# Patient Record
Sex: FEMALE | Race: WHITE | NOT HISPANIC OR LATINO | Employment: OTHER | URBAN - METROPOLITAN AREA
[De-identification: names, ages, dates, MRNs, and addresses within clinical notes are randomized per-mention and may not be internally consistent; named-entity substitution may affect disease eponyms.]

---

## 2019-12-01 ENCOUNTER — TRANSFERRED RECORDS (OUTPATIENT)
Dept: HEALTH INFORMATION MANAGEMENT | Facility: CLINIC | Age: 74
End: 2019-12-01

## 2019-12-03 ENCOUNTER — TRANSFERRED RECORDS (OUTPATIENT)
Dept: HEALTH INFORMATION MANAGEMENT | Facility: CLINIC | Age: 74
End: 2019-12-03

## 2020-01-28 ENCOUNTER — APPOINTMENT (OUTPATIENT)
Dept: CT IMAGING | Facility: CLINIC | Age: 75
End: 2020-01-28
Attending: EMERGENCY MEDICINE
Payer: COMMERCIAL

## 2020-01-28 ENCOUNTER — APPOINTMENT (OUTPATIENT)
Dept: GENERAL RADIOLOGY | Facility: CLINIC | Age: 75
End: 2020-01-28
Attending: EMERGENCY MEDICINE
Payer: COMMERCIAL

## 2020-01-28 ENCOUNTER — HOSPITAL ENCOUNTER (EMERGENCY)
Facility: CLINIC | Age: 75
Discharge: HOME | End: 2020-01-28
Attending: EMERGENCY MEDICINE | Admitting: EMERGENCY MEDICINE
Payer: COMMERCIAL

## 2020-01-28 ENCOUNTER — TRANSFERRED RECORDS (OUTPATIENT)
Dept: HEALTH INFORMATION MANAGEMENT | Facility: CLINIC | Age: 75
End: 2020-01-28

## 2020-01-28 VITALS
RESPIRATION RATE: 21 BRPM | HEART RATE: 73 BPM | TEMPERATURE: 98 F | OXYGEN SATURATION: 97 % | DIASTOLIC BLOOD PRESSURE: 89 MMHG | WEIGHT: 170.19 LBS | SYSTOLIC BLOOD PRESSURE: 168 MMHG

## 2020-01-28 DIAGNOSIS — I48.0 PAROXYSMAL ATRIAL FIBRILLATION (H): ICD-10-CM

## 2020-01-28 LAB
ANION GAP SERPL CALCULATED.3IONS-SCNC: 5 MMOL/L (ref 3–14)
BASOPHILS # BLD AUTO: 0 10E9/L (ref 0–0.2)
BASOPHILS NFR BLD AUTO: 0.4 %
BUN SERPL-MCNC: 21 MG/DL (ref 7–30)
CALCIUM SERPL-MCNC: 9.6 MG/DL (ref 8.5–10.1)
CHLORIDE SERPL-SCNC: 107 MMOL/L (ref 94–109)
CO2 SERPL-SCNC: 28 MMOL/L (ref 20–32)
CREAT SERPL-MCNC: 0.72 MG/DL (ref 0.52–1.04)
D DIMER PPP FEU-MCNC: 0.6 UG/ML FEU (ref 0–0.5)
DIFFERENTIAL METHOD BLD: NORMAL
EOSINOPHIL # BLD AUTO: 0.2 10E9/L (ref 0–0.7)
EOSINOPHIL NFR BLD AUTO: 2.3 %
ERYTHROCYTE [DISTWIDTH] IN BLOOD BY AUTOMATED COUNT: 12.3 % (ref 10–15)
GFR SERPL CREATININE-BSD FRML MDRD: 82 ML/MIN/{1.73_M2}
GLUCOSE SERPL-MCNC: 117 MG/DL (ref 70–99)
HCT VFR BLD AUTO: 44.3 % (ref 35–47)
HGB BLD-MCNC: 14.4 G/DL (ref 11.7–15.7)
IMM GRANULOCYTES # BLD: 0 10E9/L (ref 0–0.4)
IMM GRANULOCYTES NFR BLD: 0.3 %
LYMPHOCYTES # BLD AUTO: 2.6 10E9/L (ref 0.8–5.3)
LYMPHOCYTES NFR BLD AUTO: 27.6 %
MCH RBC QN AUTO: 30.3 PG (ref 26.5–33)
MCHC RBC AUTO-ENTMCNC: 32.5 G/DL (ref 31.5–36.5)
MCV RBC AUTO: 93 FL (ref 78–100)
MONOCYTES # BLD AUTO: 1 10E9/L (ref 0–1.3)
MONOCYTES NFR BLD AUTO: 10.9 %
NEUTROPHILS # BLD AUTO: 5.5 10E9/L (ref 1.6–8.3)
NEUTROPHILS NFR BLD AUTO: 58.5 %
NRBC # BLD AUTO: 0 10*3/UL
NRBC BLD AUTO-RTO: 0 /100
PLATELET # BLD AUTO: 314 10E9/L (ref 150–450)
POTASSIUM SERPL-SCNC: 3.8 MMOL/L (ref 3.4–5.3)
RBC # BLD AUTO: 4.76 10E12/L (ref 3.8–5.2)
SODIUM SERPL-SCNC: 140 MMOL/L (ref 133–144)
TROPONIN I SERPL-MCNC: <0.015 UG/L (ref 0–0.04)
WBC # BLD AUTO: 9.4 10E9/L (ref 4–11)

## 2020-01-28 PROCEDURE — 84484 ASSAY OF TROPONIN QUANT: CPT | Performed by: EMERGENCY MEDICINE

## 2020-01-28 PROCEDURE — 93005 ELECTROCARDIOGRAM TRACING: CPT | Mod: 76

## 2020-01-28 PROCEDURE — 85379 FIBRIN DEGRADATION QUANT: CPT | Performed by: EMERGENCY MEDICINE

## 2020-01-28 PROCEDURE — 99285 EMERGENCY DEPT VISIT HI MDM: CPT | Mod: 25

## 2020-01-28 PROCEDURE — 71046 X-RAY EXAM CHEST 2 VIEWS: CPT

## 2020-01-28 PROCEDURE — 93005 ELECTROCARDIOGRAM TRACING: CPT

## 2020-01-28 PROCEDURE — 25000128 H RX IP 250 OP 636: Performed by: EMERGENCY MEDICINE

## 2020-01-28 PROCEDURE — 80048 BASIC METABOLIC PNL TOTAL CA: CPT | Performed by: EMERGENCY MEDICINE

## 2020-01-28 PROCEDURE — 25000125 ZZHC RX 250: Performed by: EMERGENCY MEDICINE

## 2020-01-28 PROCEDURE — 85025 COMPLETE CBC W/AUTO DIFF WBC: CPT | Performed by: EMERGENCY MEDICINE

## 2020-01-28 PROCEDURE — 25800030 ZZH RX IP 258 OP 636: Performed by: EMERGENCY MEDICINE

## 2020-01-28 PROCEDURE — 71275 CT ANGIOGRAPHY CHEST: CPT

## 2020-01-28 RX ORDER — IOPAMIDOL 755 MG/ML
500 INJECTION, SOLUTION INTRAVASCULAR ONCE
Status: COMPLETED | OUTPATIENT
Start: 2020-01-28 | End: 2020-01-28

## 2020-01-28 RX ORDER — SODIUM CHLORIDE 9 MG/ML
1000 INJECTION, SOLUTION INTRAVENOUS CONTINUOUS
Status: DISCONTINUED | OUTPATIENT
Start: 2020-01-28 | End: 2020-01-28 | Stop reason: HOSPADM

## 2020-01-28 RX ADMIN — SODIUM CHLORIDE 1000 ML: 9 INJECTION, SOLUTION INTRAVENOUS at 18:08

## 2020-01-28 RX ADMIN — IOPAMIDOL 60 ML: 755 INJECTION, SOLUTION INTRAVENOUS at 20:17

## 2020-01-28 RX ADMIN — SODIUM CHLORIDE 80 ML: 9 INJECTION, SOLUTION INTRAVENOUS at 20:18

## 2020-01-28 ASSESSMENT — ENCOUNTER SYMPTOMS
DIAPHORESIS: 0
COUGH: 0
PALPITATIONS: 1
SHORTNESS OF BREATH: 0
NAUSEA: 0
FEVER: 0
DIZZINESS: 0
VOMITING: 0

## 2020-01-28 NOTE — ED AVS SNAPSHOT
[facility] Emergency Department  [address]  [facility] [address]  Phone:  [phone]  Fax:  [phone]                                    Ebony Pinzon   MRN: [id number]    Department:  [facility] Emergency Department   Date of Visit:  1/28/2020           After Visit Summary Signature Page    I have received my discharge instructions, and my questions have been answered. I have discussed any challenges I see with this plan with the nurse or doctor.    ..........................................................................................................................................  Patient/Patient Representative Signature      ..........................................................................................................................................  Patient Representative Print Name and Relationship to Patient    ..................................................               ................................................  Date                                   Time    ..........................................................................................................................................  Reviewed by Signature/Title    ...................................................              ..............................................  Date                                               Time          22EPIC Rev 08/18

## 2020-01-28 NOTE — ED PROVIDER NOTES
History     Chief Complaint:  Palpitations    HPI   Ebony Pinzon is a 74 year old female, with a history of hypertension, who presents with her  to the ED for evaluation of palpitations. The patient reports she has a history of occasional episodes of palpitations. She had an episode that lasted for approximately an hour 2 months ago. She was evaluated by her PCP the following day and had a cardiac monitor for 24 hours. The patient notes she had an episode of persistent palpitations which began while sitting in her recliner at 2:00PM today. The patient denies any recent travel, chest pain, shortness of breath, fever, diaphoresis, dizziness, cough, nausea, vomiting, or other symptoms/complaints.      Allergies:  Augmentin: GI intolerance  Codeine: nausea     Medications:    Enalapril   Omeprazole   Calcium  Magnesium   Zinc  Centrum    Past Medical History:    GERD  HTN    Past Surgical History:    Bilateral carpal tunnel release  Tonsillectomy     Family History:    History reviewed. No pertinent family history.     Social History:  Smoking status: Never smoker    Alcohol use: Yes  Presents to ED with     Review of Systems   Constitutional: Negative for diaphoresis and fever.   Respiratory: Negative for cough and shortness of breath.    Cardiovascular: Positive for palpitations. Negative for chest pain.   Gastrointestinal: Negative for nausea and vomiting.   Neurological: Negative for dizziness.   All other systems reviewed and are negative.    Physical Exam     Patient Vitals for the past 24 hrs:   BP Temp Temp src Pulse Heart Rate Resp SpO2 Weight   01/28/20 2130 (!) 168/89 -- -- 73 77 21 97 % --   01/28/20 2125 (!) 155/118 -- -- -- 73 29 96 % --   01/28/20 2009 -- -- -- -- -- -- -- 77.2 kg (170 lb 3.1 oz)   01/28/20 2000 (!) 142/70 -- -- 75 -- -- 94 % --   01/28/20 1945 133/67 -- -- -- -- -- 96 % --   01/28/20 1845 (!) 151/88 -- -- 90 76 23 98 % --   01/28/20 1830 (!) 159/83 -- -- 77 97 --  96 % --   01/28/20 1815 (!) 150/81 -- -- 75 77 11 95 % --   01/28/20 1800 (!) 145/87 -- -- 89 81 12 97 % --   01/28/20 1745 (!) 146/82 -- -- 96 98 13 95 % --   01/28/20 1735 (!) 164/87 -- -- 100 101 16 98 % --   01/28/20 1720 (!) 167/114 98  F (36.7  C) Temporal -- 124 18 100 % --     Physical Exam  General: Patient is alert and interactive when I enter the room  Head:  The scalp, face, and head appear normal  Eyes:  Conjunctivae are normal  ENT:    The nose is normal    Pinnae are normal    External acoustic canals are normal  Neck:  Trachea midline  CV:  Pulses are normal, RRR    Resp:  No respiratory distress, CTAB   Abdomen:      Soft, non-tender, non-distended  Musc:  Normal muscular tone    No major joint effusions    No asymmetric leg swelling  Skin:  No rash or lesions noted  Neuro:  Speech is normal and fluent. Face is symmetric.     Moving all extremities well.   Psych: Awake. Alert.  Normal affect.  Appropriate interactions.    Emergency Department Course     ECG #1 (17:10:00):  Rate 154 bpm. IL interval *. QRS duration 80. QT/QTc 282/451. P-R-T axes * 61 124. Atrial fibrillation with rapid ventricular response. ST & T wave abnormality, consider inferior ischemia. Abnormal ECG. Interpreted at 0714 by Justyna Hernández MD.    ECG #2 (17:34:03):  Rate 104 bpm. IL interval 158. QRS duration 86. QT/QTc 332/436. P-R-T axes 73 63 69. Sinus tachycardia with premature atrial complexes. Right atrial enlargement. Borderline ECG. Interpreted at 1740 by Justyna Hernández MD.    Imaging:  Radiographic findings were communicated with the patient and family who voiced understanding of the findings.    XR Chest 2 Views  IMPRESSION: Negative chest. As read by Radiology.     CT Chest Pulmonary Embolism w Contrast  IMPRESSION:  1. Negative for pulmonary emboli.   2. Focal atelectasis in lingula.   As read by Radiology.     Laboratory:  Laboratory findings were communicated with the patient and family who voiced understanding  of the findings.    Troponin I (1731): <0.015  D dimer: 0.6(H)    CBC: o/w WNL (WBC 9.4, HGB 14.4, )  BMP: Glucose 117(H), o/w WNL (Creatinine 0.72)     Interventions:  1808: NS 1L Bolus IV    Emergency Department Course:  The patient was placed on continuous cardiac monitoring.    1710: EKG obtained in the ED, see results above.     1731: IV was inserted and blood was drawn for laboratory testing, results above.    1734: Repeat EKG obtained.    Past medical records, nursing notes, and vitals reviewed.    1748: I performed an exam of the patient as documented above.     The patient was sent for a chest x-ray while in the emergency department, results above.     2028: I rechecked the patient. Explained findings to patient and .    The patient was sent for a chest CT while in the emergency department, findings above.    2118: I rechecked the patient and discussed the results of her workup thus far.     Findings and plan explained to the patient and spouse. Patient discharged home with instructions regarding supportive care, medications, and reasons to return. The importance of close follow-up was reviewed. The patient was prescribed Xarelto.     I personally reviewed the laboratory results with the patient and spouse and answered all related questions prior to discharge.     Impression & Plan      Medical Decision Making:  Ebony Pinzon is a 75 yo F who presents with palpatitions. Her initial EKG showed afib w/ RVR. This is a new diagnosis for her. Fortunately, patient cardioverted to NSR on her own. EKG confirmed this. She has had similar symptoms before. Her JOYCE Vasc2 is 3. Recommend anticoagulation. D-dimer was slightly elevated so CTPE was done. This was normal. Troponin normal. Patient will be discharged with cardiology follow-up and xarelto. Counseled on anticoagulation. Patient discharged.     Diagnosis:    ICD-10-CM   1. Paroxysmal atrial fibrillation (H) I48.0     Disposition: Patient  discharged to home with       Discharge Medications:   Details   rivaroxaban ANTICOAGULANT (XARELTO ANTICOAGULANT) 20 MG TABS tablet Take 1 tablet (20 mg) by mouth daily (with dinner), Disp-30 tablet, R-0, Local Print     Tiffany Tanner  1/28/2020   [facility] EMERGENCY DEPARTMENT    Scribe Disclosure:  I, Tiffany Tanner, am serving as a scribe at 5:48 PM on 1/28/2020 to document services personally performed by Justyna Hernández MD based on my observations and the provider's statements to me.        Justyna Hernández MD  01/31/20 0994

## 2020-01-28 NOTE — ED TRIAGE NOTES
Pt arrives with  for palpitations that started at 2 pm while pt was sitting in recliner. Denies hx of abnormal heart rhythm. Denies SOB, chest pain, or dizziness.

## 2020-01-29 LAB
INTERPRETATION ECG - MUSE: NORMAL
INTERPRETATION ECG - MUSE: NORMAL

## 2020-01-29 NOTE — ED NOTES
Pt teaching done regarding new Xarelto prescription. Print out provided from [place] [employer] [facility] and reviewed w/ pt. I explained to pt that she will need to stop using Aleve daily due to the increased risk for bleeding, other medications to avoid, when to take it, and when to return to the hospital. I additionally explained why blood thinners are used with afib and she verbalized understanding. Card for [facility] was also provided to the pt.

## 2020-01-31 ENCOUNTER — HOSPITAL ENCOUNTER (OUTPATIENT)
Dept: CARDIOLOGY | Facility: CLINIC | Age: 75
Discharge: HOME | End: 2020-01-31
Attending: INTERNAL MEDICINE | Admitting: INTERNAL MEDICINE
Payer: COMMERCIAL

## 2020-01-31 ENCOUNTER — OFFICE VISIT (OUTPATIENT)
Dept: CARDIOLOGY | Facility: CLINIC | Age: 75
End: 2020-01-31
Payer: COMMERCIAL

## 2020-01-31 VITALS
HEIGHT: 67 IN | WEIGHT: 169.8 LBS | BODY MASS INDEX: 26.65 KG/M2 | SYSTOLIC BLOOD PRESSURE: 134 MMHG | HEART RATE: 72 BPM | DIASTOLIC BLOOD PRESSURE: 80 MMHG

## 2020-01-31 DIAGNOSIS — I48.0 PAROXYSMAL ATRIAL FIBRILLATION (H): ICD-10-CM

## 2020-01-31 DIAGNOSIS — I48.0 PAROXYSMAL ATRIAL FIBRILLATION (H): Primary | ICD-10-CM

## 2020-01-31 PROCEDURE — 93306 TTE W/DOPPLER COMPLETE: CPT

## 2020-01-31 PROCEDURE — 99204 OFFICE O/P NEW MOD 45 MIN: CPT | Mod: 25 | Performed by: INTERNAL MEDICINE

## 2020-01-31 PROCEDURE — 93306 TTE W/DOPPLER COMPLETE: CPT | Mod: 26 | Performed by: INTERNAL MEDICINE

## 2020-01-31 RX ORDER — ACETAMINOPHEN 325 MG/1
325-650 TABLET ORAL EVERY 6 HOURS PRN
COMMUNITY
End: 2022-08-22

## 2020-01-31 RX ORDER — ENALAPRIL MALEATE 20 MG/1
20 TABLET ORAL DAILY
COMMUNITY
End: 2021-01-29

## 2020-01-31 RX ORDER — MULTIVIT WITH MINERALS/LUTEIN
1 TABLET ORAL DAILY
COMMUNITY

## 2020-01-31 RX ORDER — METOPROLOL TARTRATE 25 MG/1
25 TABLET, FILM COATED ORAL 2 TIMES DAILY
Qty: 30 TABLET | Refills: 1 | Status: SHIPPED | OUTPATIENT
Start: 2020-01-31 | End: 2020-06-15

## 2020-01-31 ASSESSMENT — MIFFLIN-ST. JEOR: SCORE: 1307.84

## 2020-01-31 NOTE — LETTER
1/31/2020      [facility]  [address] [facility] [address]      RE: Ebonyleticia Pinzon       Dear Colleague,    I had the pleasure of seeing Ebony Pinzon in the [facility].    Service Date: 01/31/2020      NEW PATIENT VISIT      REFERRING PHYSICIAN:  [facility].      HISTORY OF PRESENT ILLNESS:  Ms. Pinzon is a very pleasant 74-year-old female who comes into clinic today after presenting to the ER 2 days ago with symptoms of palpitations.  She was noted to be in paroxysmal atrial fibrillation.  She did spontaneously convert.  It looks like she was quite hypertensive when she first presented.  Blood pressure 168/89.  I am not sure if they gave her anything.  It is not recorded here, but she does say she went back into a normal rhythm before she left.  She had some tests including a chest CT that was fairly normal, troponin normal, and lab tests, basic metabolic panel and CBC which were normal.  She was given Xarelto for CVA prophylaxis due to a high CHADS score and recommended to follow up with Cardiology.  She states this has happened a couple of times in the past, last was in November around Thanksgiving time.  Her and her  have recently moved to a different home.  This has been very stressful for her.  She has been drinking a lot of caffeine.  She does not drink excessive alcohol, but does have an occasional glass of wine or two.  She has had high blood pressure and has been treated with enalapril and this has worked well to control her blood pressure.  She denies history of diabetes or previous stroke.  No previous history of any type of heart disease.  Her father had what sounds like congestive heart failure and an aortic valve problem.  No history of atrial arrhythmias that she was aware of.  She does have a longstanding murmur since childhood that she knows about.  I do not see that she has had an  echocardiogram in recent decades, at least in her chart.  I did review her EKG from 2 days ago in the ER.  This does demonstrate an atrial fibrillation with a rapid ventricular rate with nonspecific ST and T changes.  Heart rate was 154.  A second electrocardiogram shows a sinus tachycardia with conversion back to normal rhythm on the last beat.      PHYSICAL EXAMINATION:   VITAL SIGNS:  On exam today, her blood pressure is 134/80, pulse is 72, weight is 169 with a body mass index of 26.   NECK:  Carotid upstrokes are brisk without bruit.   CARDIOVASCULAR:  Tones today are regular, suggesting a normal sinus rhythm.  She does have a grade 2 systolic ejection murmur best heard at the right upper sternal border.   LUNGS:  Clear.   EXTREMITIES:  She has no peripheral edema.  She does have a history of varicose veins with vein ablation procedure last year in [facility].      SUMMARY:  Ms. Pinzon is a very pleasant 74-year-old female with a history of hypertension and new onset paroxysmal atrial fibrillation.  Most of this appointment was spent in education and counseling about her atrial arrhythmia, reasoning for anticoagulation.  She does have a CHADS-VASc score of 3, making it appropriate to continue with the Xarelto.  She is tolerating this medication so far.  We talked about rate versus rhythm control strategy.  It sounds like she has only had a couple episodes of atrial fibrillation.  We talked about switching her blood pressure medication from lisinopril to metoprolol.  It may help to suppress atrial fibrillation even though it is not a true antiarrhythmic.  Her and her  are, however, going [place] in about a week for a few days and so I do not want to change any of her medications right before she leaves.  We will plan to see her back after she returns.  In the meantime, I would like her to undergo an echocardiogram just to look for any structural abnormalities to make sure that this continues to be a  benign flow murmur that she has on auscultation.  She is agreeable and we will get that scheduled right away before she leaves and the results to her and then follow up with her when she returns.        Please feel free to contact me with any questions you have in regards to her care.  Thank you for allowing me to participate in the care of your nice patient.         GENNY ORO DO             D: 2020   T: 2020   MT: REG      Name:     ARIANA ROBB   MRN:      [id number]        Account:      [id number]   :      1945           Service Date: 2020      Document: [id number]         Outpatient Encounter Medications as of 2020   Medication Sig Dispense Refill     acetaminophen (TYLENOL) 325 MG tablet Take 325-650 mg by mouth every 6 hours as needed for mild pain       Calcium Carb-Cholecalciferol (CALCIUM 500+D3 PO) Take by mouth daily       enalapril (VASOTEC) 20 MG tablet Take 20 mg by mouth daily       metoprolol tartrate (LOPRESSOR) 25 MG tablet Take 1 tablet (25 mg) by mouth 2 times daily 30 tablet 1     multivitamin (CENTRUM SILVER) tablet Take 1 tablet by mouth daily       omeprazole (PRILOSEC) 20 MG DR capsule Take 20 mg by mouth daily       rivaroxaban ANTICOAGULANT (XARELTO ANTICOAGULANT) 20 MG TABS tablet Take 1 tablet (20 mg) by mouth daily (with dinner) 30 tablet 0     No facility-administered encounter medications on file as of 2020.        Again, thank you for allowing me to participate in the care of your patient.      Sincerely,    Genny Oro DO     [facility]

## 2020-01-31 NOTE — LETTER
1/31/2020    [facility]  [address] [facility] [address]    RE: Ebony Pinzon       Dear Colleague,    I had the pleasure of seeing Ebony Pinzon in the [facility].    HPI and Plan:   See dictation    Orders Placed This Encounter   Procedures     Follow-Up with Cardiologist     Echocardiogram Complete       Orders Placed This Encounter   Medications     enalapril (VASOTEC) 20 MG tablet     Sig: Take 20 mg by mouth daily     omeprazole (PRILOSEC) 20 MG DR capsule     Sig: Take 20 mg by mouth daily     multivitamin (CENTRUM SILVER) tablet     Sig: Take 1 tablet by mouth daily     Calcium Carb-Cholecalciferol (CALCIUM 500+D3 PO)     Sig: Take by mouth daily     acetaminophen (TYLENOL) 325 MG tablet     Sig: Take 325-650 mg by mouth every 6 hours as needed for mild pain     metoprolol tartrate (LOPRESSOR) 25 MG tablet     Sig: Take 1 tablet (25 mg) by mouth 2 times daily     Dispense:  30 tablet     Refill:  1       There are no discontinued medications.      Encounter Diagnosis   Name Primary?     Paroxysmal atrial fibrillation (H) Yes       CURRENT MEDICATIONS:  Current Outpatient Medications   Medication Sig Dispense Refill     acetaminophen (TYLENOL) 325 MG tablet Take 325-650 mg by mouth every 6 hours as needed for mild pain       Calcium Carb-Cholecalciferol (CALCIUM 500+D3 PO) Take by mouth daily       enalapril (VASOTEC) 20 MG tablet Take 20 mg by mouth daily       metoprolol tartrate (LOPRESSOR) 25 MG tablet Take 1 tablet (25 mg) by mouth 2 times daily 30 tablet 1     multivitamin (CENTRUM SILVER) tablet Take 1 tablet by mouth daily       omeprazole (PRILOSEC) 20 MG DR capsule Take 20 mg by mouth daily       rivaroxaban ANTICOAGULANT (XARELTO ANTICOAGULANT) 20 MG TABS tablet Take 1 tablet (20 mg) by mouth daily (with dinner) 30 tablet 0       ALLERGIES   No Known Allergies    PAST MEDICAL HISTORY:  No past medical history on  file.    PAST SURGICAL HISTORY:  No past surgical history on file.    FAMILY HISTORY:  Family History   Problem Relation Age of Onset     Breast Cancer Mother      Atrial fibrillation Father      Other - See Comments Father         enlarged heart; leaky heart valve     Cataracts Sister      No Known Problems Brother      No Known Problems Sister      No Known Problems Brother      Arthritis Son      Arthritis Son        SOCIAL HISTORY:  Social History     Socioeconomic History     Marital status:      Spouse name: None     Number of children: None     Years of education: None     Highest education level: None   Occupational History     None   Social Needs     Financial resource strain: None     Food insecurity:     Worry: None     Inability: None     Transportation needs:     Medical: None     Non-medical: None   Tobacco Use     Smoking status: Never Smoker     Smokeless tobacco: Never Used   Substance and Sexual Activity     Alcohol use: Not Currently     Drug use: None     Sexual activity: None   Lifestyle     Physical activity:     Days per week: None     Minutes per session: None     Stress: None   Relationships     Social connections:     Talks on phone: None     Gets together: None     Attends [employer] service: None     Active member of club or organization: None     Attends meetings of clubs or organizations: None     Relationship status: None     Intimate partner violence:     Fear of current or ex partner: None     Emotionally abused: None     Physically abused: None     Forced sexual activity: None   Other Topics Concern     None   Social History Narrative     None       Review of Systems:  Skin:  Negative       Eyes:  Positive for glasses    ENT:  Negative      Respiratory:  Negative       Cardiovascular:    Positive for;palpitations;chest pain    Gastroenterology: Negative      Genitourinary:  Positive for urgency    Musculoskeletal:  Positive for arthritis;nocturnal cramping thumbs, shoulders,  "knee, hip  Neurologic:  Negative      Psychiatric:  Negative      Heme/Lymph/Imm:  Negative      Endocrine:  Negative        Physical Exam:  Vitals: /80 (BP Location: Right arm, Patient Position: Sitting, Cuff Size: Adult Regular)   Pulse 72   Ht 1.702 m (5' 7\")   Wt 77 kg (169 lb 12.8 oz)   BMI 26.59 kg/m       Constitutional:  cooperative;well nourished;in no acute distress        Skin:  warm and dry to the touch          Head:  normocephalic        Eyes:  pupils equal and round        Lymph:      ENT:  no pallor or cyanosis        Neck:  carotid pulses are full and equal bilaterally        Respiratory:  clear to auscultation;normal symmetry         Cardiac: regular rhythm;no murmurs, gallops or rubs detected                pulses full and equal                                        GI:  abdomen soft;no bruits        Extremities and Muscular Skeletal:  no deformities, clubbing, cyanosis, erythema observed;no edema              Neurological:  no gross motor deficits;affect appropriate        Psych:  Alert and Oriented x 3          [place] [address]                    Thank you for allowing me to participate in the care of your patient.      Sincerely,     Genny Oro,      [facility]    cc:   [place] [address]        "

## 2020-01-31 NOTE — PROGRESS NOTES
HPI and Plan:   See dictation    Orders Placed This Encounter   Procedures     Follow-Up with Cardiologist     Echocardiogram Complete       Orders Placed This Encounter   Medications     enalapril (VASOTEC) 20 MG tablet     Sig: Take 20 mg by mouth daily     omeprazole (PRILOSEC) 20 MG DR capsule     Sig: Take 20 mg by mouth daily     multivitamin (CENTRUM SILVER) tablet     Sig: Take 1 tablet by mouth daily     Calcium Carb-Cholecalciferol (CALCIUM 500+D3 PO)     Sig: Take by mouth daily     acetaminophen (TYLENOL) 325 MG tablet     Sig: Take 325-650 mg by mouth every 6 hours as needed for mild pain     metoprolol tartrate (LOPRESSOR) 25 MG tablet     Sig: Take 1 tablet (25 mg) by mouth 2 times daily     Dispense:  30 tablet     Refill:  1       There are no discontinued medications.      Encounter Diagnosis   Name Primary?     Paroxysmal atrial fibrillation (H) Yes       CURRENT MEDICATIONS:  Current Outpatient Medications   Medication Sig Dispense Refill     acetaminophen (TYLENOL) 325 MG tablet Take 325-650 mg by mouth every 6 hours as needed for mild pain       Calcium Carb-Cholecalciferol (CALCIUM 500+D3 PO) Take by mouth daily       enalapril (VASOTEC) 20 MG tablet Take 20 mg by mouth daily       metoprolol tartrate (LOPRESSOR) 25 MG tablet Take 1 tablet (25 mg) by mouth 2 times daily 30 tablet 1     multivitamin (CENTRUM SILVER) tablet Take 1 tablet by mouth daily       omeprazole (PRILOSEC) 20 MG DR capsule Take 20 mg by mouth daily       rivaroxaban ANTICOAGULANT (XARELTO ANTICOAGULANT) 20 MG TABS tablet Take 1 tablet (20 mg) by mouth daily (with dinner) 30 tablet 0       ALLERGIES   No Known Allergies    PAST MEDICAL HISTORY:  No past medical history on file.    PAST SURGICAL HISTORY:  No past surgical history on file.    FAMILY HISTORY:  Family History   Problem Relation Age of Onset     Breast Cancer Mother      Atrial fibrillation Father      Other - See Comments Father         enlarged heart; leaky  heart valve     Cataracts Sister      No Known Problems Brother      No Known Problems Sister      No Known Problems Brother      Arthritis Son      Arthritis Son        SOCIAL HISTORY:  Social History     Socioeconomic History     Marital status:      Spouse name: None     Number of children: None     Years of education: None     Highest education level: None   Occupational History     None   Social Needs     Financial resource strain: None     Food insecurity:     Worry: None     Inability: None     Transportation needs:     Medical: None     Non-medical: None   Tobacco Use     Smoking status: Never Smoker     Smokeless tobacco: Never Used   Substance and Sexual Activity     Alcohol use: Not Currently     Drug use: None     Sexual activity: None   Lifestyle     Physical activity:     Days per week: None     Minutes per session: None     Stress: None   Relationships     Social connections:     Talks on phone: None     Gets together: None     Attends [employer] service: None     Active member of club or organization: None     Attends meetings of clubs or organizations: None     Relationship status: None     Intimate partner violence:     Fear of current or ex partner: None     Emotionally abused: None     Physically abused: None     Forced sexual activity: None   Other Topics Concern     None   Social History Narrative     None       Review of Systems:  Skin:  Negative       Eyes:  Positive for glasses    ENT:  Negative      Respiratory:  Negative       Cardiovascular:    Positive for;palpitations;chest pain    Gastroenterology: Negative      Genitourinary:  Positive for urgency    Musculoskeletal:  Positive for arthritis;nocturnal cramping thumbs, shoulders, knee, hip  Neurologic:  Negative      Psychiatric:  Negative      Heme/Lymph/Imm:  Negative      Endocrine:  Negative        Physical Exam:  Vitals: /80 (BP Location: Right arm, Patient Position: Sitting, Cuff Size: Adult Regular)   Pulse 72   Ht  "1.702 m (5' 7\")   Wt 77 kg (169 lb 12.8 oz)   BMI 26.59 kg/m      Constitutional:  cooperative;well nourished;in no acute distress        Skin:  warm and dry to the touch          Head:  normocephalic        Eyes:  pupils equal and round        Lymph:      ENT:  no pallor or cyanosis        Neck:  carotid pulses are full and equal bilaterally        Respiratory:  clear to auscultation;normal symmetry         Cardiac: regular rhythm;no murmurs, gallops or rubs detected                pulses full and equal                                        GI:  abdomen soft;no bruits        Extremities and Muscular Skeletal:  no deformities, clubbing, cyanosis, erythema observed;no edema              Neurological:  no gross motor deficits;affect appropriate        Psych:  Alert and Oriented x 3          CC  [facility]  [address] [place] [address]                  "

## 2020-01-31 NOTE — PROGRESS NOTES
Service Date: 01/31/2020      NEW PATIENT VISIT      REFERRING PHYSICIAN:  [facility].      HISTORY OF PRESENT ILLNESS:  Ms. Pinzon is a very pleasant 74-year-old female who comes into clinic today after presenting to the ER 2 days ago with symptoms of palpitations.  She was noted to be in paroxysmal atrial fibrillation.  She did spontaneously convert.  It looks like she was quite hypertensive when she first presented.  Blood pressure 168/89.  I am not sure if they gave her anything.  It is not recorded here, but she does say she went back into a normal rhythm before she left.  She had some tests including a chest CT that was fairly normal, troponin normal, and lab tests, basic metabolic panel and CBC which were normal.  She was given Xarelto for CVA prophylaxis due to a high CHADS score and recommended to follow up with Cardiology.  She states this has happened a couple of times in the past, last was in November around Thanksgiving time.  Her and her  have recently moved to a different home.  This has been very stressful for her.  She has been drinking a lot of caffeine.  She does not drink excessive alcohol, but does have an occasional glass of wine or two.  She has had high blood pressure and has been treated with enalapril and this has worked well to control her blood pressure.  She denies history of diabetes or previous stroke.  No previous history of any type of heart disease.  Her father had what sounds like congestive heart failure and an aortic valve problem.  No history of atrial arrhythmias that she was aware of.  She does have a longstanding murmur since childhood that she knows about.  I do not see that she has had an echocardiogram in recent decades, at least in her chart.  I did review her EKG from 2 days ago in the ER.  This does demonstrate an atrial fibrillation with a rapid ventricular rate with nonspecific ST and T changes.  Heart rate was 154.  A second  electrocardiogram shows a sinus tachycardia with conversion back to normal rhythm on the last beat.      PHYSICAL EXAMINATION:   VITAL SIGNS:  On exam today, her blood pressure is 134/80, pulse is 72, weight is 169 with a body mass index of 26.   NECK:  Carotid upstrokes are brisk without bruit.   CARDIOVASCULAR:  Tones today are regular, suggesting a normal sinus rhythm.  She does have a grade 2 systolic ejection murmur best heard at the right upper sternal border.   LUNGS:  Clear.   EXTREMITIES:  She has no peripheral edema.  She does have a history of varicose veins with vein ablation procedure last year in [facility].      SUMMARY:  Ms. Pinzon is a very pleasant 74-year-old female with a history of hypertension and new onset paroxysmal atrial fibrillation.  Most of this appointment was spent in education and counseling about her atrial arrhythmia, reasoning for anticoagulation.  She does have a CHADS-VASc score of 3, making it appropriate to continue with the Xarelto.  She is tolerating this medication so far.  We talked about rate versus rhythm control strategy.  It sounds like she has only had a couple episodes of atrial fibrillation.  We talked about switching her blood pressure medication from lisinopril to metoprolol.  It may help to suppress atrial fibrillation even though it is not a true antiarrhythmic.  Her and her  are, however, going overseas in about a week for a few days and so I do not want to change any of her medications right before she leaves.  We will plan to see her back after she returns.  In the meantime, I would like her to undergo an echocardiogram just to look for any structural abnormalities to make sure that this continues to be a benign flow murmur that she has on auscultation.  She is agreeable and we will get that scheduled right away before she leaves and the results to her and then follow up with her when she returns.        Please feel free to contact me with any  questions you have in regards to her care.  Thank you for allowing me to participate in the care of your nice patient.         VAHE ESQUIVEL DO             D: 2020   T: 2020   MT: REG      Name:     ARIANA ROBB   MRN:      [id number]        Account:      [id number]   :      1945           Service Date: 2020      Document: [id number]

## 2020-02-05 ENCOUNTER — MYC MEDICAL ADVICE (OUTPATIENT)
Dept: CARDIOLOGY | Facility: CLINIC | Age: 75
End: 2020-02-05

## 2020-02-06 NOTE — TELEPHONE ENCOUNTER
Received dentaZOOM message below from pt:     Ebony Pinzon [facility] Heart Team 1   Phone Number: [phone]             Pending sale to [facility] Dr. Oro.    Would it be safe for me to take one naproxen a day in the morning? I am on 20 mg of Xeralto, which I take with my evening meal.   I have noticed that the one naproxen seem to shaw off a fair bit of stiffness and tylonel just doesn't seem to help that much.  Will definitely wait to hear back before I start.  Thank you.     Ebony Pinzon  [phone] cell.      Will route to Dr. Oro to review.     Addendum 2/7/20: Reviewed with Dr. Oro, who advised against pt taking naproxen due to risk of increased bleeding. Response sent to pt via dentaZOOM.

## 2020-02-21 ENCOUNTER — OFFICE VISIT (OUTPATIENT)
Dept: CARDIOLOGY | Facility: CLINIC | Age: 75
End: 2020-02-21
Attending: INTERNAL MEDICINE
Payer: COMMERCIAL

## 2020-02-21 ENCOUNTER — TELEPHONE (OUTPATIENT)
Dept: CARDIOLOGY | Facility: CLINIC | Age: 75
End: 2020-02-21

## 2020-02-21 VITALS
WEIGHT: 167.7 LBS | BODY MASS INDEX: 26.32 KG/M2 | HEIGHT: 67 IN | HEART RATE: 76 BPM | SYSTOLIC BLOOD PRESSURE: 122 MMHG | DIASTOLIC BLOOD PRESSURE: 60 MMHG

## 2020-02-21 DIAGNOSIS — I48.0 PAROXYSMAL ATRIAL FIBRILLATION (H): ICD-10-CM

## 2020-02-21 PROCEDURE — 99214 OFFICE O/P EST MOD 30 MIN: CPT | Performed by: INTERNAL MEDICINE

## 2020-02-21 RX ORDER — NITROFURANTOIN 25; 75 MG/1; MG/1
CAPSULE ORAL
COMMUNITY
Start: 2020-02-20 | End: 2021-08-13

## 2020-02-21 ASSESSMENT — MIFFLIN-ST. JEOR: SCORE: 1298.31

## 2020-02-21 NOTE — LETTER
2/21/2020    [facility]  [address] [facility] [place] [address]    RE: Ebony Pinzon       Dear Colleague,    I had the pleasure of seeing Ebony Pinzon in the [facility].    HPI and Plan:   See dictation    No orders of the defined types were placed in this encounter.      Orders Placed This Encounter   Medications     nitroFURantoin macrocrystal-monohydrate 100 MG PO capsule     rivaroxaban ANTICOAGULANT (XARELTO ANTICOAGULANT) 20 MG PO TABS tablet     Sig: Take 1 tablet (20 mg) by mouth daily (with dinner)     Dispense:  90 tablet     Refill:  4       Medications Discontinued During This Encounter   Medication Reason     rivaroxaban ANTICOAGULANT (XARELTO ANTICOAGULANT) 20 MG TABS tablet Reorder         Encounter Diagnosis   Name Primary?     Paroxysmal atrial fibrillation (H)        CURRENT MEDICATIONS:  Current Outpatient Medications   Medication Sig Dispense Refill     acetaminophen (TYLENOL) 325 MG tablet Take 325-650 mg by mouth every 6 hours as needed for mild pain       Calcium Carb-Cholecalciferol (CALCIUM 500+D3 PO) Take by mouth daily       enalapril (VASOTEC) 20 MG tablet Take 20 mg by mouth daily       metoprolol tartrate (LOPRESSOR) 25 MG tablet Take 1 tablet (25 mg) by mouth 2 times daily (Patient taking differently: Take 25 mg by mouth as needed ) 30 tablet 1     multivitamin (CENTRUM SILVER) tablet Take 1 tablet by mouth daily       nitroFURantoin macrocrystal-monohydrate 100 MG PO capsule        omeprazole (PRILOSEC) 20 MG DR capsule Take 20 mg by mouth daily       rivaroxaban ANTICOAGULANT (XARELTO ANTICOAGULANT) 20 MG PO TABS tablet Take 1 tablet (20 mg) by mouth daily (with dinner) 90 tablet 4       ALLERGIES     Allergies   Allergen Reactions     Augmentin      Other reaction(s): GI intolerance  C-Diff     Codeine Nausea       PAST MEDICAL HISTORY:  Past Medical History:   Diagnosis Date     Paroxysmal A-fib (H)  2/21/2020       PAST SURGICAL HISTORY:  History reviewed. No pertinent surgical history.    FAMILY HISTORY:  Family History   Problem Relation Age of Onset     Breast Cancer Mother      Atrial fibrillation Father      Other - See Comments Father         enlarged heart; leaky heart valve     Cataracts Sister      No Known Problems Brother      No Known Problems Sister      No Known Problems Brother      Arthritis Son      Arthritis Son        SOCIAL HISTORY:  Social History     Socioeconomic History     Marital status:      Spouse name: None     Number of children: None     Years of education: None     Highest education level: None   Occupational History     None   Social Needs     Financial resource strain: None     Food insecurity:     Worry: None     Inability: None     Transportation needs:     Medical: None     Non-medical: None   Tobacco Use     Smoking status: Never Smoker     Smokeless tobacco: Never Used   Substance and Sexual Activity     Alcohol use: Not Currently     Drug use: None     Sexual activity: None   Lifestyle     Physical activity:     Days per week: None     Minutes per session: None     Stress: None   Relationships     Social connections:     Talks on phone: None     Gets together: None     Attends Hinduism service: None     Active member of club or organization: None     Attends meetings of clubs or organizations: None     Relationship status: None     Intimate partner violence:     Fear of current or ex partner: None     Emotionally abused: None     Physically abused: None     Forced sexual activity: None   Other Topics Concern     None   Social History Narrative     None       Review of Systems:  Skin:  Negative       Eyes:  Positive for glasses    ENT:  Negative      Respiratory:  Negative       Cardiovascular:  Negative      Gastroenterology: Negative      Genitourinary:  Positive for urinary tract infection;urinary frequency    Musculoskeletal:  Positive for arthritis   "  Neurologic:  Negative      Psychiatric:  Negative      Heme/Lymph/Imm:  Negative      Endocrine:  Negative        Physical Exam:  Vitals: /60 (BP Location: Right arm, Patient Position: Sitting, Cuff Size: Adult Regular)   Pulse 76   Ht 1.702 m (5' 7\")   Wt 76.1 kg (167 lb 11.2 oz)   LMP  (LMP Unknown)   Breastfeeding No   BMI 26.27 kg/m       Constitutional:  cooperative;well nourished;in no acute distress        Skin:  warm and dry to the touch          Head:  normocephalic        Eyes:  pupils equal and round        Lymph:      ENT:  no pallor or cyanosis        Neck:  carotid pulses are full and equal bilaterally        Respiratory:  clear to auscultation;normal symmetry         Cardiac: regular rhythm;no murmurs, gallops or rubs detected                pulses full and equal                                        GI:  abdomen soft;no bruits        Extremities and Muscular Skeletal:  no deformities, clubbing, cyanosis, erythema observed;no edema              Neurological:  no gross motor deficits;affect appropriate        Psych:  Alert and Oriented x 3          CC  Genyn Oro DO  [address]  [place] [address]                    Thank you for allowing me to participate in the care of your patient.      Sincerely,     Genny Oro DO     [facility]    cc:   Genny Oro DO  [address]  [place] [address]        "

## 2020-02-21 NOTE — PROGRESS NOTES
HPI and Plan:   See dictation    No orders of the defined types were placed in this encounter.      Orders Placed This Encounter   Medications     nitroFURantoin macrocrystal-monohydrate 100 MG PO capsule     rivaroxaban ANTICOAGULANT (XARELTO ANTICOAGULANT) 20 MG PO TABS tablet     Sig: Take 1 tablet (20 mg) by mouth daily (with dinner)     Dispense:  90 tablet     Refill:  4       Medications Discontinued During This Encounter   Medication Reason     rivaroxaban ANTICOAGULANT (XARELTO ANTICOAGULANT) 20 MG TABS tablet Reorder         Encounter Diagnosis   Name Primary?     Paroxysmal atrial fibrillation (H)        CURRENT MEDICATIONS:  Current Outpatient Medications   Medication Sig Dispense Refill     acetaminophen (TYLENOL) 325 MG tablet Take 325-650 mg by mouth every 6 hours as needed for mild pain       Calcium Carb-Cholecalciferol (CALCIUM 500+D3 PO) Take by mouth daily       enalapril (VASOTEC) 20 MG tablet Take 20 mg by mouth daily       metoprolol tartrate (LOPRESSOR) 25 MG tablet Take 1 tablet (25 mg) by mouth 2 times daily (Patient taking differently: Take 25 mg by mouth as needed ) 30 tablet 1     multivitamin (CENTRUM SILVER) tablet Take 1 tablet by mouth daily       nitroFURantoin macrocrystal-monohydrate 100 MG PO capsule        omeprazole (PRILOSEC) 20 MG DR capsule Take 20 mg by mouth daily       rivaroxaban ANTICOAGULANT (XARELTO ANTICOAGULANT) 20 MG PO TABS tablet Take 1 tablet (20 mg) by mouth daily (with dinner) 90 tablet 4       ALLERGIES     Allergies   Allergen Reactions     Augmentin      Other reaction(s): GI intolerance  C-Diff     Codeine Nausea       PAST MEDICAL HISTORY:  Past Medical History:   Diagnosis Date     Paroxysmal A-fib (H) 2/21/2020       PAST SURGICAL HISTORY:  History reviewed. No pertinent surgical history.    FAMILY HISTORY:  Family History   Problem Relation Age of Onset     Breast Cancer Mother      Atrial fibrillation Father      Other - See Comments Father          "enlarged heart; leaky heart valve     Cataracts Sister      No Known Problems Brother      No Known Problems Sister      No Known Problems Brother      Arthritis Son      Arthritis Son        SOCIAL HISTORY:  Social History     Socioeconomic History     Marital status:      Spouse name: None     Number of children: None     Years of education: None     Highest education level: None   Occupational History     None   Social Needs     Financial resource strain: None     Food insecurity:     Worry: None     Inability: None     Transportation needs:     Medical: None     Non-medical: None   Tobacco Use     Smoking status: Never Smoker     Smokeless tobacco: Never Used   Substance and Sexual Activity     Alcohol use: Not Currently     Drug use: None     Sexual activity: None   Lifestyle     Physical activity:     Days per week: None     Minutes per session: None     Stress: None   Relationships     Social connections:     Talks on phone: None     Gets together: None     Attends Jain service: None     Active member of club or organization: None     Attends meetings of clubs or organizations: None     Relationship status: None     Intimate partner violence:     Fear of current or ex partner: None     Emotionally abused: None     Physically abused: None     Forced sexual activity: None   Other Topics Concern     None   Social History Narrative     None       Review of Systems:  Skin:  Negative       Eyes:  Positive for glasses    ENT:  Negative      Respiratory:  Negative       Cardiovascular:  Negative      Gastroenterology: Negative      Genitourinary:  Positive for urinary tract infection;urinary frequency    Musculoskeletal:  Positive for arthritis    Neurologic:  Negative      Psychiatric:  Negative      Heme/Lymph/Imm:  Negative      Endocrine:  Negative        Physical Exam:  Vitals: /60 (BP Location: Right arm, Patient Position: Sitting, Cuff Size: Adult Regular)   Pulse 76   Ht 1.702 m (5' 7\")   " Wt 76.1 kg (167 lb 11.2 oz)   LMP  (LMP Unknown)   Breastfeeding No   BMI 26.27 kg/m      Constitutional:  cooperative;well nourished;in no acute distress        Skin:  warm and dry to the touch          Head:  normocephalic        Eyes:  pupils equal and round        Lymph:      ENT:  no pallor or cyanosis        Neck:  carotid pulses are full and equal bilaterally        Respiratory:  clear to auscultation;normal symmetry         Cardiac: regular rhythm;no murmurs, gallops or rubs detected                pulses full and equal                                        GI:  abdomen soft;no bruits        Extremities and Muscular Skeletal:  no deformities, clubbing, cyanosis, erythema observed;no edema              Neurological:  no gross motor deficits;affect appropriate        Psych:  Alert and Oriented x 3          CC  Gennyedgardo Oro DO  [address]  [place] [address]

## 2020-02-21 NOTE — TELEPHONE ENCOUNTER
Received call from pt stating she is not sure if she can make it to her appointment today with Dr. Oro because she has a bladder infection. Pt stated she has seen her PCP and is being treated but she is having to use the bathroom frequently and not sure if she's up for an appointment. Offered to reschedule and pt stated she is going to think about it. Pt stated she would like to keep the appointment to discuss her medications. Gave pt phone number for Team 1 and requested pt call back if she does not feel well enough to come in and would like to reschedule.

## 2020-02-21 NOTE — PROGRESS NOTES
Service Date: 02/21/2020      HISTORY OF PRESENT ILLNESS:  Ms. Pinzon is a very pleasant 74-year-old female with a history of hypertension and paroxysmal atrial fibrillation.  I saw her at the end of January after an ER visit for an episode of atrial fibrillation.  She did spontaneously convert on her own.        She does have a CHADS-VASc score of 3, and therefore they did prescribe her Xarelto, which she has continued to take.      When I saw her in clinic, we talked about different management options including switching her blood pressure medication, enalapril, to metoprolol to reduce her risk for rapid atrial fibrillation; however, [place] was going [place] and I did not want to change her medications prior to that.  I did provide her some short-acting metoprolol in case she did have symptoms overseas and explained this medication and its potential side effects.        Unfortunately, she did not need it.  She has cut back on her alcohol and caffeine intake and she did not have an episode since our last visit.        We did talk once again about potentially switching her blood pressure medications, but we do run the risk of her not being as well controlled with her blood pressure and/or having side effects with the new medication.  Another option would be simply to keep the metoprolol on hand in case she does develop a rapid heart rate or palpitation symptoms.  Management strategy may change depending on her course with atrial fibrillation.  She is doing well on the Xarelto without any complications.        We did talk about some of her pain issues.  She has a lot of issues with back pain and had previously taken ibuprofen or Aleve for this.  I did caution her about the risks of bleeding, particularly GI bleeding, with taking chronic nonsteroidal anti-inflammatories with anticoagulation.  We talked about some options for that as well.      PHYSICAL EXAMINATION:  On exam today, blood pressure is  122/60, pulse is 76 and regular, consistent with normal sinus rhythm.  Body mass index of 26.  Physical exam findings were otherwise unchanged.      SUMMARY:  Ms. Pinzon is a very pleasant 74-year-old female with a history of paroxysmal atrial fibrillation and hypertension.  She is on Xarelto for CVA prophylaxis and tolerating this.  She has not had any recurrent episodes since our last visit.  We talked about different management strategies and currently at present because of the infrequency of her symptoms, she will keep metoprolol on hand if she has palpitations or rapid heartbeat, and she will contact me for whatever reason this management strategy is not effective or working for her.  I did refill her Xarelto for the year and I am happy to see her back as needed.        Please feel free to contact me with any questions you have in regards to her care.      cc:      [facility] and [address] [address]         VAHE ESQUIVEL DO             D: 2020   T: 2020   MT: TYRONE      Name:     ARIANA PINZON   MRN:      [id number]        Account:      [id number]   :      1945           Service Date: 2020      Document: [id number]

## 2020-02-21 NOTE — LETTER
2/21/2020      [facility]  [address] [place] [address]      RE: Ebony Parkith       Dear Colleague,    I had the pleasure of seeing Ebony Pinzon in the [facility].    Service Date: 02/21/2020      HISTORY OF PRESENT ILLNESS:  Ms. Pinzon is a very pleasant 74-year-old female with a history of hypertension and paroxysmal atrial fibrillation.  I saw her at the end of January after an ER visit for an episode of atrial fibrillation.  She did spontaneously convert on her own.        She does have a CHADS-VASc score of 3, and therefore they did prescribe her Xarelto, which she has continued to take.      When I saw her in clinic, we talked about different management options including switching her blood pressure medication, enalapril, to metoprolol to reduce her risk for rapid atrial fibrillation; however, she was going [place] on a [place] and I did not want to change her medications prior to that.  I did provide her some short-acting metoprolol in case she did have symptoms overseas and explained this medication and its potential side effects.        Unfortunately, she did not need it.  She has cut back on her alcohol and caffeine intake and she did not have an episode since our last visit.        We did talk once again about potentially switching her blood pressure medications, but we do run the risk of her not being as well controlled with her blood pressure and/or having side effects with the new medication.  Another option would be simply to keep the metoprolol on hand in case she does develop a rapid heart rate or palpitation symptoms.  Management strategy may change depending on her course with atrial fibrillation.  She is doing well on the Xarelto without any complications.        We did talk about some of her pain issues.  She has a lot of issues with back pain and had previously taken ibuprofen or Aleve for this.  I  did caution her about the risks of bleeding, particularly GI bleeding, with taking chronic nonsteroidal anti-inflammatories with anticoagulation.  We talked about some options for that as well.      PHYSICAL EXAMINATION:  On exam today, blood pressure is 122/60, pulse is 76 and regular, consistent with normal sinus rhythm.  Body mass index of 26.  Physical exam findings were otherwise unchanged.      SUMMARY:  Ms. Pinzon is a very pleasant 74-year-old female with a history of paroxysmal atrial fibrillation and hypertension.  She is on Xarelto for CVA prophylaxis and tolerating this.  She has not had any recurrent episodes since our last visit.  We talked about different management strategies and currently at present because of the infrequency of her symptoms, she will keep metoprolol on hand if she has palpitations or rapid heartbeat, and she will contact me for whatever reason this management strategy is not effective or working for her.  I did refill her Xarelto for the year and I am happy to see her back as needed.        Please feel free to contact me with any questions you have in regards to her care.      cc:      [facility] [place] [address]         VAHE ESQUIVEL DO             D: 2020   T: 2020   MT: TYRONE      Name:     ARIANA PINZON   MRN:      [id number]        Account:      [id number]   :      1945           Service Date: 2020      Document: [id number]         Outpatient Encounter Medications as of 2020   Medication Sig Dispense Refill     acetaminophen (TYLENOL) 325 MG tablet Take 325-650 mg by mouth every 6 hours as needed for mild pain       Calcium Carb-Cholecalciferol (CALCIUM 500+D3 PO) Take by mouth daily       enalapril (VASOTEC) 20 MG tablet Take 20 mg by mouth daily       metoprolol tartrate (LOPRESSOR) 25 MG tablet Take 1 tablet (25 mg) by mouth 2 times daily (Patient taking differently: Take 25 mg by  mouth as needed ) 30 tablet 1     multivitamin (CENTRUM SILVER) tablet Take 1 tablet by mouth daily       nitroFURantoin macrocrystal-monohydrate 100 MG PO capsule        omeprazole (PRILOSEC) 20 MG DR capsule Take 20 mg by mouth daily       rivaroxaban ANTICOAGULANT (XARELTO ANTICOAGULANT) 20 MG PO TABS tablet Take 1 tablet (20 mg) by mouth daily (with dinner) 90 tablet 4     [DISCONTINUED] rivaroxaban ANTICOAGULANT (XARELTO ANTICOAGULANT) 20 MG TABS tablet Take 1 tablet (20 mg) by mouth daily (with dinner) 30 tablet 0     No facility-administered encounter medications on file as of 2/21/2020.        Again, thank you for allowing me to participate in the care of your patient.      Sincerely,    Genny Oro,      [facility]

## 2020-03-11 ENCOUNTER — HEALTH MAINTENANCE LETTER (OUTPATIENT)
Age: 75
End: 2020-03-11

## 2020-06-15 DIAGNOSIS — I48.0 PAROXYSMAL ATRIAL FIBRILLATION (H): ICD-10-CM

## 2020-06-15 RX ORDER — METOPROLOL TARTRATE 25 MG/1
25 TABLET, FILM COATED ORAL 2 TIMES DAILY
Qty: 90 TABLET | Refills: 2 | Status: SHIPPED | OUTPATIENT
Start: 2020-06-15 | End: 2020-10-06

## 2021-01-04 ENCOUNTER — HEALTH MAINTENANCE LETTER (OUTPATIENT)
Age: 76
End: 2021-01-04

## 2021-01-05 ENCOUNTER — VIRTUAL VISIT (OUTPATIENT)
Dept: CARDIOLOGY | Facility: CLINIC | Age: 76
End: 2021-01-05
Payer: COMMERCIAL

## 2021-01-05 DIAGNOSIS — I48.0 PAROXYSMAL ATRIAL FIBRILLATION (H): Primary | ICD-10-CM

## 2021-01-05 DIAGNOSIS — I10 ESSENTIAL HYPERTENSION: ICD-10-CM

## 2021-01-05 PROCEDURE — 99214 OFFICE O/P EST MOD 30 MIN: CPT | Mod: 95 | Performed by: INTERNAL MEDICINE

## 2021-01-05 RX ORDER — DILTIAZEM HYDROCHLORIDE 60 MG/1
60 CAPSULE, EXTENDED RELEASE ORAL 2 TIMES DAILY
Qty: 60 CAPSULE | Refills: 3 | Status: SHIPPED | OUTPATIENT
Start: 2021-01-05 | End: 2021-01-14

## 2021-01-05 NOTE — LETTER
1/5/2021    [facility]  [address]  [facility] [address]    RE: Ebony AQUINO [place]       Dear Colleague,    I had the pleasure of seeing Ebony Pinzon in the [facility].    Telephone visit dictated  Total visit time 17minutes    Service Date: 01/05/2021      TELEPHONE VISIT      REFERRING PROVIDER:  Dr. Whelan at [facility] in [place].      HISTORY OF PRESENT ILLNESS:  Ms. Pinzon is a very pleasant 75-year-old female with a history of paroxysmal atrial fibrillation and hypertension.  She scheduled a followup visit today.  Unfortunately, she went to [place] instead of [place] and so we did transition this to a telephone visit for her.  She has been having trouble with metoprolol.  I had prescribed her this last year for both her atrial fibrillation and her blood pressure.  She states she was not regularly taking it up until recently and she gets a funny feeling in her head and a tingling sensation whenever she takes it.  She actually went down to the [facility] and had a second consult with the cardiologist down there who had actually recommended to increase the dose of the metoprolol because of her elevated blood pressures.  She did make an attempt at increasing this and had more difficulty with the funny feeling in her head and tingling, so she went back to taking the dose that I had prescribed but she is not comfortable continuing to take this medication.  Her blood pressures have been elevated lately in the 140s to 160s systolic over 80s to 90s diastolic.  Today, she measured it at 154/81 and a pulse of 75.  A few hours later, she rechecked it at 138/86 and a pulse of 79.  She has had infrequent episodes of atrial fibrillation over the past year that are self-limited.      SUMMARY:  Ms. Pinzon is a very pleasant 75-year-old female with a history of paroxysmal atrial fibrillation.  She is not tolerating metoprolol and  therefore, I have recommended discontinuing this medication.  I have suggested that she try diltiazem once again for both heart rate control with atrial fibrillation and for her blood pressure.  I am going to start her off at a low dose of 60 mg twice daily and see how she tolerates this.  I went over the potential side effects of this medication.  She should continue Xarelto for CVA prophylaxis and continue to monitor her blood pressure.  I will see her back in a few weeks and determine whether this is effective in controlling her blood pressure as well as preventing fast heart rhythms and if she is tolerating.  Please feel free to contact me with any questions you have in regards to her care.         GENNY ORO DO             D: 2021   T: 2021   MT: NIESHA      Name:     ARIANA ROBB   MRN:      [id number]        Account:      [id number]   :      1945           Service Date: 2021      Document: [id number]        Thank you for allowing me to participate in the care of your patient.    Sincerely,     Genny Oro DO     [facility]

## 2021-01-06 ENCOUNTER — MYC MEDICAL ADVICE (OUTPATIENT)
Dept: CARDIOLOGY | Facility: CLINIC | Age: 76
End: 2021-01-06

## 2021-01-06 NOTE — PROGRESS NOTES
Service Date: 01/05/2021      TELEPHONE VISIT      REFERRING PROVIDER:  Dr. Whelan at [facility] in [place].      HISTORY OF PRESENT ILLNESS:  Ms. Pinzon is a very pleasant 75-year-old female with a history of paroxysmal atrial fibrillation and hypertension.  She scheduled a followup visit today.  Unfortunately, she went to [place] instead of [place] and so we did transition this to a telephone visit for her.  She has been having trouble with metoprolol.  I had prescribed her this last year for both her atrial fibrillation and her blood pressure.  She states she was not regularly taking it up until recently and she gets a funny feeling in her head and a tingling sensation whenever she takes it.  She actually went down to the [facility] and had a second consult with the cardiologist down there who had actually recommended to increase the dose of the metoprolol because of her elevated blood pressures.  She did make an attempt at increasing this and had more difficulty with the funny feeling in her head and tingling, so she went back to taking the dose that I had prescribed but she is not comfortable continuing to take this medication.  Her blood pressures have been elevated lately in the 140s to 160s systolic over 80s to 90s diastolic.  Today, she measured it at 154/81 and a pulse of 75.  A few hours later, she rechecked it at 138/86 and a pulse of 79.  She has had infrequent episodes of atrial fibrillation over the past year that are self-limited.      SUMMARY:  Ms. Pinzon is a very pleasant 75-year-old female with a history of paroxysmal atrial fibrillation.  She is not tolerating metoprolol and therefore, I have recommended discontinuing this medication.  I have suggested that she try diltiazem once again for both heart rate control with atrial fibrillation and for her blood pressure.  I am going to start her off at a low dose of 60 mg twice daily and see how she tolerates this.  I went over the  potential side effects of this medication.  She should continue Xarelto for CVA prophylaxis and continue to monitor her blood pressure.  I will see her back in a few weeks and determine whether this is effective in controlling her blood pressure as well as preventing fast heart rhythms and if she is tolerating.  Please feel free to contact me with any questions you have in regards to her care.         VAHE ESQUIVEL DO             D: 2021   T: 2021   MT: NIESHA      Name:     ARIANA ROBB   MRN:      [id number]        Account:      [id number]   :      1945           Service Date: 2021      Document: [id number]

## 2021-01-12 NOTE — TELEPHONE ENCOUNTER
Genny Oro, Sandra Doyle, RN   Caller: Unspecified (6 days ago, 10:41 AM)   Phone Number: [phone]             I gave her a very small dose, I would like her to try 120mg BID before we call it a failure.

## 2021-01-14 ENCOUNTER — MYC MEDICAL ADVICE (OUTPATIENT)
Dept: CARDIOLOGY | Facility: CLINIC | Age: 76
End: 2021-01-14

## 2021-01-14 DIAGNOSIS — I48.0 PAROXYSMAL ATRIAL FIBRILLATION (H): ICD-10-CM

## 2021-01-14 DIAGNOSIS — I10 ESSENTIAL HYPERTENSION: ICD-10-CM

## 2021-01-14 RX ORDER — DILTIAZEM HYDROCHLORIDE 120 MG/1
120 CAPSULE, EXTENDED RELEASE ORAL 2 TIMES DAILY
Qty: 60 CAPSULE | Refills: 11 | Status: SHIPPED | OUTPATIENT
Start: 2021-01-14 | End: 2021-01-29

## 2021-01-29 ENCOUNTER — OFFICE VISIT (OUTPATIENT)
Dept: CARDIOLOGY | Facility: CLINIC | Age: 76
End: 2021-01-29
Attending: INTERNAL MEDICINE
Payer: COMMERCIAL

## 2021-01-29 VITALS
BODY MASS INDEX: 27.18 KG/M2 | HEIGHT: 67 IN | WEIGHT: 173.2 LBS | HEART RATE: 65 BPM | DIASTOLIC BLOOD PRESSURE: 71 MMHG | SYSTOLIC BLOOD PRESSURE: 135 MMHG

## 2021-01-29 DIAGNOSIS — I48.0 PAROXYSMAL ATRIAL FIBRILLATION (H): ICD-10-CM

## 2021-01-29 DIAGNOSIS — I10 ESSENTIAL HYPERTENSION: ICD-10-CM

## 2021-01-29 PROCEDURE — 99213 OFFICE O/P EST LOW 20 MIN: CPT | Performed by: INTERNAL MEDICINE

## 2021-01-29 RX ORDER — ENALAPRIL MALEATE 20 MG/1
20 TABLET ORAL DAILY
Qty: 90 TABLET | Refills: 4 | Status: SHIPPED | OUTPATIENT
Start: 2021-01-29 | End: 2022-04-06

## 2021-01-29 RX ORDER — DILTIAZEM HYDROCHLORIDE 120 MG/1
120 CAPSULE, EXTENDED RELEASE ORAL 2 TIMES DAILY
Qty: 180 CAPSULE | Refills: 4 | Status: SHIPPED | OUTPATIENT
Start: 2021-01-29 | End: 2021-08-13

## 2021-01-29 ASSESSMENT — MIFFLIN-ST. JEOR: SCORE: 1318.26

## 2021-01-29 NOTE — LETTER
1/29/2021      [facility]  [address] [facility] [address]      RE: Ebony Park[place]       Dear Colleague,    I had the pleasure of seeing Ebony Pinzon in the [facility].    Service Date: 01/29/2021      HISTORY OF PRESENT ILLNESS:  Ms. Pinzon is a pleasant 75-year-old female with a history of paroxysmal atrial fibrillation and hypertension.  She was having difficulty with metoprolol and so we transitioned her to diltiazem.  Initially she was having trouble with diltiazem, but we did up the dose to 120 mg twice daily and this seems to be working out well for her.        She has been monitoring her blood pressure and heart rate with this and her blood pressures are typically in the 120s-140s now.  Heart rates in the 60s-70s.  She has not had any significant bouts of atrial fibrillation with this change and she is happy with the transition now.      She is having some GI issues and it sounds like she may have developed some lactose intolerance and so she has been dealing with that lately too.  She did request for renewals of her prescriptions for Xarelto, enalapril and diltiazem today in a 3-months supply, which I provided.      PHYSICAL EXAMINATION:  On exam, her blood pressure is 135/71, pulse is 65, weight 173.  Body mass index of 27.  Her physical exam findings are otherwise unchanged.      SUMMARY:  Ms. Pinzon is a very pleasant 75-year-old female with a history of paroxysmal atrial fibrillation and hypertension.  She was doing much better on diltiazem with good blood pressure and heart rate control.  I did renew this prescription for her in a 3-month supply for the year, along with her enalapril and Xarelto.        I will be happy to see her back annually or as needed.  Please feel free to contact me with any questions you have in regards to her care.      cc:      [place], [place]          GENNY ORO DO             D: 2021   T: 2021   MT: TYRONE      Name:     ARIANA ROBB   MRN:      [id number]        Account:      [id number]   :      1945           Service Date: 2021      Document: [id number]          Outpatient Encounter Medications as of 2021   Medication Sig Dispense Refill     acetaminophen (TYLENOL) 325 MG tablet Take 325-650 mg by mouth every 6 hours as needed for mild pain       Calcium Carb-Cholecalciferol (CALCIUM 500+D3 PO) Take by mouth daily       diltiazem (CARDIZEM SR) 120 MG CP12 12 hr SR capsule Take 1 capsule (120 mg) by mouth 2 times daily 180 capsule 4     enalapril (VASOTEC) 20 MG tablet Take 1 tablet (20 mg) by mouth daily 90 tablet 4     multivitamin (CENTRUM SILVER) tablet Take 1 tablet by mouth daily       nitroFURantoin macrocrystal-monohydrate 100 MG PO capsule        omeprazole (PRILOSEC) 20 MG DR capsule Take 20 mg by mouth daily       Probiotic Product (PROBIOTIC ADVANCED PO)        rivaroxaban ANTICOAGULANT (XARELTO ANTICOAGULANT) 20 MG TABS tablet Take 1 tablet (20 mg) by mouth daily (with dinner) 90 tablet 4     [DISCONTINUED] diltiazem ER (CARDIZEM SR) 120 MG CP12 12 hr SR capsule Take 1 capsule (120 mg) by mouth 2 times daily 60 capsule 11     [DISCONTINUED] enalapril (VASOTEC) 20 MG tablet Take 20 mg by mouth daily       [DISCONTINUED] rivaroxaban ANTICOAGULANT (XARELTO ANTICOAGULANT) 20 MG PO TABS tablet Take 1 tablet (20 mg) by mouth daily (with dinner) 90 tablet 4     No facility-administered encounter medications on file as of 2021.        Again, thank you for allowing me to participate in the care of your patient.      Sincerely,    Genny Oro DO     [facility]

## 2021-01-29 NOTE — PROGRESS NOTES
HPI and Plan:   See dictation    No orders of the defined types were placed in this encounter.      Orders Placed This Encounter   Medications     Probiotic Product (PROBIOTIC ADVANCED PO)     enalapril (VASOTEC) 20 MG tablet     Sig: Take 1 tablet (20 mg) by mouth daily     Dispense:  90 tablet     Refill:  4     rivaroxaban ANTICOAGULANT (XARELTO ANTICOAGULANT) 20 MG TABS tablet     Sig: Take 1 tablet (20 mg) by mouth daily (with dinner)     Dispense:  90 tablet     Refill:  4     diltiazem (CARDIZEM SR) 120 MG CP12 12 hr SR capsule     Sig: Take 1 capsule (120 mg) by mouth 2 times daily     Dispense:  180 capsule     Refill:  4       Medications Discontinued During This Encounter   Medication Reason     enalapril (VASOTEC) 20 MG tablet Reorder     rivaroxaban ANTICOAGULANT (XARELTO ANTICOAGULANT) 20 MG PO TABS tablet Reorder     diltiazem ER (CARDIZEM SR) 120 MG CP12 12 hr SR capsule Reorder         Encounter Diagnoses   Name Primary?     Paroxysmal atrial fibrillation (H)      Essential hypertension        CURRENT MEDICATIONS:  Current Outpatient Medications   Medication Sig Dispense Refill     acetaminophen (TYLENOL) 325 MG tablet Take 325-650 mg by mouth every 6 hours as needed for mild pain       Calcium Carb-Cholecalciferol (CALCIUM 500+D3 PO) Take by mouth daily       diltiazem (CARDIZEM SR) 120 MG CP12 12 hr SR capsule Take 1 capsule (120 mg) by mouth 2 times daily 180 capsule 4     enalapril (VASOTEC) 20 MG tablet Take 1 tablet (20 mg) by mouth daily 90 tablet 4     multivitamin (CENTRUM SILVER) tablet Take 1 tablet by mouth daily       nitroFURantoin macrocrystal-monohydrate 100 MG PO capsule        omeprazole (PRILOSEC) 20 MG DR capsule Take 20 mg by mouth daily       Probiotic Product (PROBIOTIC ADVANCED PO)        rivaroxaban ANTICOAGULANT (XARELTO ANTICOAGULANT) 20 MG TABS tablet Take 1 tablet (20 mg) by mouth daily (with dinner) 90 tablet 4       ALLERGIES     Allergies   Allergen Reactions      Augmentin      Other reaction(s): GI intolerance  C-Diff     Codeine Nausea       PAST MEDICAL HISTORY:  Past Medical History:   Diagnosis Date     Paroxysmal A-fib (H) 2/21/2020       PAST SURGICAL HISTORY:  History reviewed. No pertinent surgical history.    FAMILY HISTORY:  Family History   Problem Relation Age of Onset     Breast Cancer Mother      Atrial fibrillation Father      Other - See Comments Father         enlarged heart; leaky heart valve     Cataracts Sister      No Known Problems Brother      No Known Problems Sister      No Known Problems Brother      Arthritis Son      Arthritis Son        SOCIAL HISTORY:  Social History     Socioeconomic History     Marital status:      Spouse name: None     Number of children: None     Years of education: None     Highest education level: None   Occupational History     None   Social Needs     Financial resource strain: None     Food insecurity     Worry: None     Inability: None     Transportation needs     Medical: None     Non-medical: None   Tobacco Use     Smoking status: Never Smoker     Smokeless tobacco: Never Used   Substance and Sexual Activity     Alcohol use: Not Currently     Drug use: None     Sexual activity: None   Lifestyle     Physical activity     Days per week: None     Minutes per session: None     Stress: None   Relationships     Social connections     Talks on phone: None     Gets together: None     Attends Scientologist service: None     Active member of club or organization: None     Attends meetings of clubs or organizations: None     Relationship status: None     Intimate partner violence     Fear of current or ex partner: None     Emotionally abused: None     Physically abused: None     Forced sexual activity: None   Other Topics Concern     None   Social History Narrative     None       Review of Systems:  Skin:  Negative       Eyes:  Positive for glasses    ENT:  Negative      Respiratory:  Negative       Cardiovascular:  Negative  "     Gastroenterology:        Genitourinary:  Negative      Musculoskeletal:  Negative      Neurologic:  Negative      Psychiatric:  Negative      Heme/Lymph/Imm:  Negative      Endocrine:  Negative        Physical Exam:  Vitals: /71 (BP Location: Right arm, Patient Position: Sitting, Cuff Size: Adult Regular)   Pulse 65   Ht 1.702 m (5' 7\")   Wt 78.6 kg (173 lb 3.2 oz)   LMP  (LMP Unknown)   Breastfeeding No   BMI 27.13 kg/m      Constitutional:  cooperative;well nourished;in no acute distress        Skin:  warm and dry to the touch          Head:  normocephalic        Eyes:  pupils equal and round        Lymph:      ENT:  no pallor or cyanosis        Neck:  carotid pulses are full and equal bilaterally        Respiratory:  clear to auscultation;normal symmetry         Cardiac: regular rhythm;no murmurs, gallops or rubs detected                pulses full and equal                                        GI:  abdomen soft;no bruits        Extremities and Muscular Skeletal:  no deformities, clubbing, cyanosis, erythema observed;no edema              Neurological:  no gross motor deficits;affect appropriate        Psych:  Alert and Oriented x 3          CC  Genny Oro, DO  [address]  [place] [id number]                  "

## 2021-01-29 NOTE — PROGRESS NOTES
Service Date: 2021      HISTORY OF PRESENT ILLNESS:  Ms. Pinzon is a pleasant 75-year-old female with a history of paroxysmal atrial fibrillation and hypertension.  She was having difficulty with metoprolol and so we transitioned her to diltiazem.  Initially she was having trouble with diltiazem, but we did up the dose to 120 mg twice daily and this seems to be working out well for her.        She has been monitoring her blood pressure and heart rate with this and her blood pressures are typically in the 120s-140s now.  Heart rates in the 60s-70s.  She has not had any significant bouts of atrial fibrillation with this change and she is happy with the transition now.      She is having some GI issues and it sounds like she may have developed some lactose intolerance and so she has been dealing with that lately too.  She did request for renewals of her prescriptions for Xarelto, enalapril and diltiazem today in a 3-months supply, which I provided.      PHYSICAL EXAMINATION:  On exam, her blood pressure is 135/71, pulse is 65, weight 173.  Body mass index of 27.  Her physical exam findings are otherwise unchanged.      SUMMARY:  Ms. Pinzon is a very pleasant 75-year-old female with a history of paroxysmal atrial fibrillation and hypertension.  She was doing much better on diltiazem with good blood pressure and heart rate control.  I did renew this prescription for her in a 3-month supply for the year, along with her enalapril and Xarelto.        I will be happy to see her back annually or as needed.  Please feel free to contact me with any questions you have in regards to her care.      cc:      [place], [place]         VAHE ESQUIVEL DO             D: 2021   T: 2021   MT: TYRONE      Name:     ARIANA PINZON   MRN:      [id number]        Account:      [id number]   :      1945           Service Date: 2021      Document: [id number]

## 2021-01-29 NOTE — LETTER
1/29/2021    [facility]  [address] [facility] [address]    RE: Ebony Pinzon       Dear Colleague,    I had the pleasure of seeing Ebony Pinzon in the [facility].    HPI and Plan:   See dictation    No orders of the defined types were placed in this encounter.      Orders Placed This Encounter   Medications     Probiotic Product (PROBIOTIC ADVANCED PO)     enalapril (VASOTEC) 20 MG tablet     Sig: Take 1 tablet (20 mg) by mouth daily     Dispense:  90 tablet     Refill:  4     rivaroxaban ANTICOAGULANT (XARELTO ANTICOAGULANT) 20 MG TABS tablet     Sig: Take 1 tablet (20 mg) by mouth daily (with dinner)     Dispense:  90 tablet     Refill:  4     diltiazem (CARDIZEM SR) 120 MG CP12 12 hr SR capsule     Sig: Take 1 capsule (120 mg) by mouth 2 times daily     Dispense:  180 capsule     Refill:  4       Medications Discontinued During This Encounter   Medication Reason     enalapril (VASOTEC) 20 MG tablet Reorder     rivaroxaban ANTICOAGULANT (XARELTO ANTICOAGULANT) 20 MG PO TABS tablet Reorder     diltiazem ER (CARDIZEM SR) 120 MG CP12 12 hr SR capsule Reorder         Encounter Diagnoses   Name Primary?     Paroxysmal atrial fibrillation (H)      Essential hypertension        CURRENT MEDICATIONS:  Current Outpatient Medications   Medication Sig Dispense Refill     acetaminophen (TYLENOL) 325 MG tablet Take 325-650 mg by mouth every 6 hours as needed for mild pain       Calcium Carb-Cholecalciferol (CALCIUM 500+D3 PO) Take by mouth daily       diltiazem (CARDIZEM SR) 120 MG CP12 12 hr SR capsule Take 1 capsule (120 mg) by mouth 2 times daily 180 capsule 4     enalapril (VASOTEC) 20 MG tablet Take 1 tablet (20 mg) by mouth daily 90 tablet 4     multivitamin (CENTRUM SILVER) tablet Take 1 tablet by mouth daily       nitroFURantoin macrocrystal-monohydrate 100 MG PO capsule        omeprazole (PRILOSEC) 20 MG DR capsule Take 20 mg by  mouth daily       Probiotic Product (PROBIOTIC ADVANCED PO)        rivaroxaban ANTICOAGULANT (XARELTO ANTICOAGULANT) 20 MG TABS tablet Take 1 tablet (20 mg) by mouth daily (with dinner) 90 tablet 4       ALLERGIES     Allergies   Allergen Reactions     Augmentin      Other reaction(s): GI intolerance  C-Diff     Codeine Nausea       PAST MEDICAL HISTORY:  Past Medical History:   Diagnosis Date     Paroxysmal A-fib (H) 2/21/2020       PAST SURGICAL HISTORY:  History reviewed. No pertinent surgical history.    FAMILY HISTORY:  Family History   Problem Relation Age of Onset     Breast Cancer Mother      Atrial fibrillation Father      Other - See Comments Father         enlarged heart; leaky heart valve     Cataracts Sister      No Known Problems Brother      No Known Problems Sister      No Known Problems Brother      Arthritis Son      Arthritis Son        SOCIAL HISTORY:  Social History     Socioeconomic History     Marital status:      Spouse name: None     Number of children: None     Years of education: None     Highest education level: None   Occupational History     None   Social Needs     Financial resource strain: None     Food insecurity     Worry: None     Inability: None     Transportation needs     Medical: None     Non-medical: None   Tobacco Use     Smoking status: Never Smoker     Smokeless tobacco: Never Used   Substance and Sexual Activity     Alcohol use: Not Currently     Drug use: None     Sexual activity: None   Lifestyle     Physical activity     Days per week: None     Minutes per session: None     Stress: None   Relationships     Social connections     Talks on phone: None     Gets together: None     Attends Judaism service: None     Active member of club or organization: None     Attends meetings of clubs or organizations: None     Relationship status: None     Intimate partner violence     Fear of current or ex partner: None     Emotionally abused: None     Physically abused: None  "    Forced sexual activity: None   Other Topics Concern     None   Social History Narrative     None       Review of Systems:  Skin:  Negative       Eyes:  Positive for glasses    ENT:  Negative      Respiratory:  Negative       Cardiovascular:  Negative      Gastroenterology:        Genitourinary:  Negative      Musculoskeletal:  Negative      Neurologic:  Negative      Psychiatric:  Negative      Heme/Lymph/Imm:  Negative      Endocrine:  Negative        Physical Exam:  Vitals: /71 (BP Location: Right arm, Patient Position: Sitting, Cuff Size: Adult Regular)   Pulse 65   Ht 1.702 m (5' 7\")   Wt 78.6 kg (173 lb 3.2 oz)   LMP  (LMP Unknown)   Breastfeeding No   BMI 27.13 kg/m      Constitutional:  cooperative;well nourished;in no acute distress        Skin:  warm and dry to the touch          Head:  normocephalic        Eyes:  pupils equal and round        Lymph:      ENT:  no pallor or cyanosis        Neck:  carotid pulses are full and equal bilaterally        Respiratory:  clear to auscultation;normal symmetry         Cardiac: regular rhythm;no murmurs, gallops or rubs detected                pulses full and equal                                        GI:  abdomen soft;no bruits        Extremities and Muscular Skeletal:  no deformities, clubbing, cyanosis, erythema observed;no edema              Neurological:  no gross motor deficits;affect appropriate        Psych:  Alert and Oriented x 3          CC  Genny Oro DO  [address]  [place] [id number]                      Thank you for allowing me to participate in the care of your patient.      Sincerely,     Genny Oro DO     [facility]    cc:   Genny Oro DO  [address]  [place] [id number]        "

## 2021-04-25 ENCOUNTER — HEALTH MAINTENANCE LETTER (OUTPATIENT)
Age: 76
End: 2021-04-25

## 2021-05-17 ENCOUNTER — TELEPHONE (OUTPATIENT)
Dept: CARDIOLOGY | Facility: CLINIC | Age: 76
End: 2021-05-17

## 2021-05-17 NOTE — TELEPHONE ENCOUNTER
Pt called to report she has been having issues with pain regarding her recent knee surgery on 3/25/21.    Pt has been struggling with pain and trying to wean herself off of the narcotic pain medication.     Pt thought her BP might be elevated last week but she was not checking it daily. Pt reports she usually can tell her BP is elevated because her HR may be racing and she feels her HR beating in her ears.     Pt reports she felt a little dizzy this morning and took her BP and it was 170/87 HR 98    Pt then rested for a half hour and took it again and it was 154/88 HR 84.    Pt took it again while on the phone and it was 158/88 HR 92.    Informed pt to take her BP and HR again tonight to see how it is after her medications.  Recommended pt try to relax today and this evening and monitor her BP and HR and also check it tomorrow morning and then call with an update.  Pt gave verbal understanding.

## 2021-05-18 NOTE — TELEPHONE ENCOUNTER
Pt called with an update on her BP.  Pt said she started feeling better yesterday afternoon.   132/78 HR 78 at 3pm on 5/17/21     Pt reports at 8pm last night her BP was 147/76 HR 76, then 10 minutes later it was 128/73 HR 72.    Pt reports today at 11am, her BP was 145/77 HR 76, then 10 minutes later it was 134/74 HR 72.    Informed pt that she should keep monitoring her BP throughout the day and see how it is tomorrow morning and if it is elevated then she should call with an update.    Pt will continue to monitor even if BP is within a normal range. Pt will send updates through [employer] if needed.

## 2021-08-09 ENCOUNTER — MYC MEDICAL ADVICE (OUTPATIENT)
Dept: CARDIOLOGY | Facility: CLINIC | Age: 76
End: 2021-08-09

## 2021-08-09 ENCOUNTER — NURSE TRIAGE (OUTPATIENT)
Dept: NURSING | Facility: CLINIC | Age: 76
End: 2021-08-09

## 2021-08-09 NOTE — TELEPHONE ENCOUNTER
Ebony has being treated for a-fib  She takes Diltiazem & Xarelto    She reports that she hasn't had an a-fib episode in a couple years    ~4:30 pm - She felt a fluttering feeling in her chest   HR = 157-158    She rested, put her feet up and her HR went down to 88    Currently HR is 101 when walking around  Resting HR is normally in the 70's    She had another episode where her HR = 140's  She could feel the irregularity    Denies: SOB, dizziness, lightheadedness    Per protocol, see HCP within 4 hours or PCP triage. Notified that on-call provider would be paged. Call back if no provider contact within 20-30 min    5:41 pm - Smart web page sent to on-call provider, Dr. Scott Freedman:  ROXI Stephen-FNA   Pt: Ebony Pinzon  Pt ph: [phone]  : 47  Hx A-fib, on Diltiazem & Xarelto; No s&s 2yr; Today, irregular episodes, HR up to 158, now 101, norm = 70's; No SOB or dizzy   MRN: [id number]  CARD: Preethi     COVID 19 Nurse Triage Plan/Patient Instructions    Please be aware that novel coronavirus (COVID-19) may be circulating in the community. If you develop symptoms such as fever, cough, or SOB or if you have concerns about the presence of another infection including coronavirus (COVID-19), please contact your health care provider or visit [email or web address].     Disposition/Instructions    Virtual Visit with provider recommended. Reference Visit Selection Guide.    Thank you for taking steps to prevent the spread of this virus.  o Limit your contact with others.  o Wear a simple mask to cover your cough.  o Wash your hands well and often.    Resources     [facility] Cren[facility]: About COVID-19: [email or web address]    CDC: What to Do If You're Sick: www.cdc.gov/coronavirus/2019-ncov/about/steps-when-sick.html    CDC: Ending Home Isolation: www.cdc.gov/coronavirus/2019-ncov/hcp/disposition-in-home-patients.html     CDC: Caring for Someone:  www.cdc.gov/coronavirus/2019-ncov/if-you-are-sick/care-for-someone.html     [facility]: Interim Guidance for Hospital Discharge to Home: www.[email or web address]/diseases/coronavirus/hcp/hospdischarge.pdf    [facility] clinical trials (COVID-19 research studies): [email or web address]-trials     Below are the COVID-19 hotlines at the [place] Department of Health ([facility]). Interpreters are available.   o For health questions: Call 852-372-1448 or 1-767.905.8727 (7 a.m. to 7 p.m.)  o For questions about schools and childcare: Call 420-158-3930 or 1-880.955.8719 (7 a.m. to 7 p.m.)     Laxmi River RN  [facility]      Reason for Disposition    [1] Heart beating very rapidly (e.g., > 140 / minute) AND [2] not present now  (Exception: during exercise)    Additional Information    Negative: Shock suspected (e.g., cold/pale/clammy skin, too weak to stand, low BP, rapid pulse)    Negative: Passed out (i.e., lost consciousness, collapsed and was not responding)    Negative: Difficult to awaken or acting confused (e.g., disoriented, slurred speech)    Negative: Visible sweat on face or sweat dripping down face    Negative: Unable to walk, or can only walk with assistance (e.g., requires support)    Negative: [1] Received SHOCK from implantable cardiac defibrillator AND [2] persisting symptoms (i.e., palpitations, lightheadedness)    Negative: [1] Dizziness, lightheadedness, or weakness AND [2] heart beating very rapidly (e.g., > 140 / minute)    Negative: [1] Dizziness, lightheadedness, or weakness AND [2] heart beating very slowly  (e.g., < 50 / minute)    Negative: Sounds like a life-threatening emergency to the triager    Negative: Difficulty breathing    Negative: Dizziness, lightheadedness, or weakness    Negative: [1] Heart beating very rapidly (e.g., > 140 / minute) AND [2] present now  (Exception: during exercise)    Negative: Heart beating very slowly (e.g., < 50 / minute)   (Exception: athlete)    Negative: New or worsened shortness of breath with activity (dyspnea on exertion)    Negative: Patient sounds very sick or weak to the triager    Protocols used: HEART RATE AND HEARTBEAT [id number]

## 2021-08-10 NOTE — TELEPHONE ENCOUNTER
"Received Kialat message from pt:     Ebony Pinzon [facility] Team 1  Phone Number: [phone]   Sayda Oro.  I have been fine as far as afib until today I noticed that flutter and skip, so checked my Versa watch and my heart rate was 157/158.  I did try to call Dr. Sherman at [facility] but they were closed and the urgent care put me through to a nurse line...who then had Dr Scott Freedman call me.  He said to get in touch with you as I may need an appointment.  I did take my bp and it was 134/86.   After supper things settled down and my heart rate was down to 72...but as the evening is wearing on it is now up to 159/160.  I do feel OK but the flutter rate is annoying.  Will look forward to hearing from you in the morning.  My cell is [phone], and home # is .  Thanks!     Called and spoke with pt. Pt reported she is still wearing her Fitbit to monitor her HR. Pt said her HR was back to 70 bpm before she went to bed last night and this morning her HR has been in the 70s as well and she is feeling back to normal. Pt said she did look back at HR readings and her pulse was up to 160 bpm on Saturday but she didn't notice at the time. Inquired about symptoms, pt stated when she was in a-fib last night she could feel her heart \"fluttering\" and reported feeling \"funny\" but denied any chest pain, SOB, dizziness or lightheadedness. Reviewed cardiac medications, pt reported she is Xarelto 20 mg daily, diltiazem 120 mg twice daily and enalapril 20 mg daily as prescribed. Pt said she wonders if heat and humidity or dehydration could be a factor as she has been active outside lately with her grandchildren visiting from [place]. Pt requested appointment with Dr. Oro to review. Scheduled pt for visit on 8/13/21 at 1:15 pm, pt aware of [place] location. Reviewed ER precautions prior to visit if pt is symptomatic with elevated HR. Pt verbalized understanding and agreement with plan.   "

## 2021-08-13 ENCOUNTER — OFFICE VISIT (OUTPATIENT)
Dept: CARDIOLOGY | Facility: CLINIC | Age: 76
End: 2021-08-13
Payer: COMMERCIAL

## 2021-08-13 VITALS
DIASTOLIC BLOOD PRESSURE: 66 MMHG | BODY MASS INDEX: 26.04 KG/M2 | SYSTOLIC BLOOD PRESSURE: 132 MMHG | HEART RATE: 95 BPM | HEIGHT: 67 IN | WEIGHT: 165.9 LBS | OXYGEN SATURATION: 97 %

## 2021-08-13 DIAGNOSIS — I10 ESSENTIAL HYPERTENSION: ICD-10-CM

## 2021-08-13 DIAGNOSIS — I48.0 PAROXYSMAL ATRIAL FIBRILLATION (H): Primary | ICD-10-CM

## 2021-08-13 PROCEDURE — 99214 OFFICE O/P EST MOD 30 MIN: CPT | Performed by: INTERNAL MEDICINE

## 2021-08-13 RX ORDER — DILTIAZEM HYDROCHLORIDE 180 MG/1
180 CAPSULE, EXTENDED RELEASE ORAL DAILY
Qty: 30 CAPSULE | Refills: 4 | Status: SHIPPED | OUTPATIENT
Start: 2021-08-13 | End: 2021-11-24

## 2021-08-13 RX ORDER — ESTRADIOL 0.1 MG/G
CREAM VAGINAL
COMMUNITY
Start: 2021-06-08

## 2021-08-13 ASSESSMENT — MIFFLIN-ST. JEOR: SCORE: 1280.15

## 2021-08-13 NOTE — LETTER
8/13/2021    [facility]  [address] [facility] [address]    RE: Ebony Pinzon       Dear Colleague,    I had the pleasure of seeing Ebony Pinzon in the [facility].    HPI and Plan:   See dictation    No orders of the defined types were placed in this encounter.      Orders Placed This Encounter   Medications     CRANBERRY PO     Sig: Take by mouth daily     estradiol (ESTRACE) 0.1 MG/GM vaginal cream     diltiazem ER (TIAZAC) 180 MG 24 hr ER beaded capsule     Sig: Take 1 capsule (180 mg) by mouth daily     Dispense:  30 capsule     Refill:  4       Medications Discontinued During This Encounter   Medication Reason     nitroFURantoin macrocrystal-monohydrate 100 MG PO capsule Therapy completed     diltiazem (CARDIZEM SR) 120 MG CP12 12 hr SR capsule          Encounter Diagnoses   Name Primary?     Paroxysmal atrial fibrillation (H) Yes     Essential hypertension        CURRENT MEDICATIONS:  Current Outpatient Medications   Medication Sig Dispense Refill     acetaminophen (TYLENOL) 325 MG tablet Take 325-650 mg by mouth every 6 hours as needed for mild pain       Calcium Carb-Cholecalciferol (CALCIUM 500+D3 PO) Take by mouth daily       CRANBERRY PO Take by mouth daily       diltiazem ER (TIAZAC) 180 MG 24 hr ER beaded capsule Take 1 capsule (180 mg) by mouth daily 30 capsule 4     enalapril (VASOTEC) 20 MG tablet Take 1 tablet (20 mg) by mouth daily 90 tablet 4     estradiol (ESTRACE) 0.1 MG/GM vaginal cream        multivitamin (CENTRUM SILVER) tablet Take 1 tablet by mouth daily       omeprazole (PRILOSEC) 20 MG DR capsule Take 20 mg by mouth daily       Probiotic Product (PROBIOTIC ADVANCED PO)        rivaroxaban ANTICOAGULANT (XARELTO ANTICOAGULANT) 20 MG TABS tablet Take 1 tablet (20 mg) by mouth daily (with dinner) 90 tablet 4       ALLERGIES     Allergies   Allergen Reactions     Augmentin       Other reaction(s): GI intolerance  C-Diff     Codeine Nausea       PAST MEDICAL HISTORY:  Past Medical History:   Diagnosis Date     Paroxysmal A-fib (H) 2/21/2020       PAST SURGICAL HISTORY:  No past surgical history on file.    FAMILY HISTORY:  Family History   Problem Relation Age of Onset     Breast Cancer Mother      Atrial fibrillation Father      Other - See Comments Father         enlarged heart; leaky heart valve     Cataracts Sister      No Known Problems Brother      No Known Problems Sister      No Known Problems Brother      Arthritis Son      Arthritis Son        SOCIAL HISTORY:  Social History     Socioeconomic History     Marital status:      Spouse name: None     Number of children: None     Years of education: None     Highest education level: None   Occupational History     None   Tobacco Use     Smoking status: Never Smoker     Smokeless tobacco: Never Used   Substance and Sexual Activity     Alcohol use: Not Currently     Drug use: None     Sexual activity: None   Other Topics Concern     None   Social History Narrative     None     Social Determinants of Health     Financial Resource Strain:      Difficulty of Paying Living Expenses:    Food Insecurity:      Worried About Running Out of Food in the Last Year:      Ran Out of Food in the Last Year:    Transportation Needs:      Lack of Transportation (Medical):      Lack of Transportation (Non-Medical):    Physical Activity:      Days of Exercise per Week:      Minutes of Exercise per Session:    Stress:      Feeling of Stress :    Social Connections:      Frequency of Communication with Friends and Family:      Frequency of Social Gatherings with Friends and Family:      Attends Religion Services:      Active Member of Clubs or Organizations:      Attends Club or Organization Meetings:      Marital Status:    Intimate Partner Violence:      Fear of Current or Ex-Partner:      Emotionally Abused:      Physically Abused:      Sexually  "Abused:        Review of Systems:  Skin:  Negative       Eyes:  Positive for glasses    ENT:  Negative      Respiratory:  Negative       Cardiovascular:    palpitations;Positive for;edema;lightheadedness left leg and when it was very hot  Gastroenterology: Positive for heartburn treated  Genitourinary:  Positive for urinary tract infection recently had three infections as of 8/13/21  Musculoskeletal:  Positive for arthritis    Neurologic:  Positive for numbness or tingling of hands carpal tunnel  Psychiatric:  Negative      Heme/Lymph/Imm:  Negative      Endocrine:  Negative        Physical Exam:  Vitals: /66   Pulse 95   Ht 1.702 m (5' 7\")   Wt 75.3 kg (165 lb 14.4 oz)   LMP  (LMP Unknown)   SpO2 97%   BMI 25.98 kg/m      Constitutional:  cooperative;well nourished;in no acute distress        Skin:  warm and dry to the touch          Head:  normocephalic        Eyes:  pupils equal and round        Lymph:      ENT:  no pallor or cyanosis        Neck:  carotid pulses are full and equal bilaterally        Respiratory:  clear to auscultation;normal symmetry         Cardiac: regular rhythm;no murmurs, gallops or rubs detected                pulses full and equal                                        GI:  abdomen soft;no bruits        Extremities and Muscular Skeletal:  no deformities, clubbing, cyanosis, erythema observed;no edema              Neurological:  no gross motor deficits;affect appropriate        Psych:  Alert and Oriented x 3          CC  No referring provider defined for this encounter.                      Thank you for allowing me to participate in the care of your patient.      Sincerely,     Genny Oro DO     [facility]  cc:   No referring provider defined for this encounter.        "

## 2021-08-13 NOTE — PROGRESS NOTES
HPI and Plan:   See dictation    No orders of the defined types were placed in this encounter.      Orders Placed This Encounter   Medications     CRANBERRY PO     Sig: Take by mouth daily     estradiol (ESTRACE) 0.1 MG/GM vaginal cream     diltiazem ER (TIAZAC) 180 MG 24 hr ER beaded capsule     Sig: Take 1 capsule (180 mg) by mouth daily     Dispense:  30 capsule     Refill:  4       Medications Discontinued During This Encounter   Medication Reason     nitroFURantoin macrocrystal-monohydrate 100 MG PO capsule Therapy completed     diltiazem (CARDIZEM SR) 120 MG CP12 12 hr SR capsule          Encounter Diagnoses   Name Primary?     Paroxysmal atrial fibrillation (H) Yes     Essential hypertension        CURRENT MEDICATIONS:  Current Outpatient Medications   Medication Sig Dispense Refill     acetaminophen (TYLENOL) 325 MG tablet Take 325-650 mg by mouth every 6 hours as needed for mild pain       Calcium Carb-Cholecalciferol (CALCIUM 500+D3 PO) Take by mouth daily       CRANBERRY PO Take by mouth daily       diltiazem ER (TIAZAC) 180 MG 24 hr ER beaded capsule Take 1 capsule (180 mg) by mouth daily 30 capsule 4     enalapril (VASOTEC) 20 MG tablet Take 1 tablet (20 mg) by mouth daily 90 tablet 4     estradiol (ESTRACE) 0.1 MG/GM vaginal cream        multivitamin (CENTRUM SILVER) tablet Take 1 tablet by mouth daily       omeprazole (PRILOSEC) 20 MG DR capsule Take 20 mg by mouth daily       Probiotic Product (PROBIOTIC ADVANCED PO)        rivaroxaban ANTICOAGULANT (XARELTO ANTICOAGULANT) 20 MG TABS tablet Take 1 tablet (20 mg) by mouth daily (with dinner) 90 tablet 4       ALLERGIES     Allergies   Allergen Reactions     Augmentin      Other reaction(s): GI intolerance  C-Diff     Codeine Nausea       PAST MEDICAL HISTORY:  Past Medical History:   Diagnosis Date     Paroxysmal A-fib (H) 2/21/2020       PAST SURGICAL HISTORY:  No past surgical history on file.    FAMILY HISTORY:  Family History   Problem Relation  Age of Onset     Breast Cancer Mother      Atrial fibrillation Father      Other - See Comments Father         enlarged heart; leaky heart valve     Cataracts Sister      No Known Problems Brother      No Known Problems Sister      No Known Problems Brother      Arthritis Son      Arthritis Son        SOCIAL HISTORY:  Social History     Socioeconomic History     Marital status:      Spouse name: None     Number of children: None     Years of education: None     Highest education level: None   Occupational History     None   Tobacco Use     Smoking status: Never Smoker     Smokeless tobacco: Never Used   Substance and Sexual Activity     Alcohol use: Not Currently     Drug use: None     Sexual activity: None   Other Topics Concern     None   Social History Narrative     None     Social Determinants of Health     Financial Resource Strain:      Difficulty of Paying Living Expenses:    Food Insecurity:      Worried About Running Out of Food in the Last Year:      Ran Out of Food in the Last Year:    Transportation Needs:      Lack of Transportation (Medical):      Lack of Transportation (Non-Medical):    Physical Activity:      Days of Exercise per Week:      Minutes of Exercise per Session:    Stress:      Feeling of Stress :    Social Connections:      Frequency of Communication with Friends and Family:      Frequency of Social Gatherings with Friends and Family:      Attends [employer] Services:      Active Member of Clubs or Organizations:      Attends Club or Organization Meetings:      Marital Status:    Intimate Partner Violence:      Fear of Current or Ex-Partner:      Emotionally Abused:      Physically Abused:      Sexually Abused:        Review of Systems:  Skin:  Negative       Eyes:  Positive for glasses    ENT:  Negative      Respiratory:  Negative       Cardiovascular:    palpitations;Positive for;edema;lightheadedness left leg and when it was very hot  Gastroenterology: Positive for heartburn  "treated  Genitourinary:  Positive for urinary tract infection recently had three infections as of 8/13/21  Musculoskeletal:  Positive for arthritis    Neurologic:  Positive for numbness or tingling of hands carpal tunnel  Psychiatric:  Negative      Heme/Lymph/Imm:  Negative      Endocrine:  Negative        Physical Exam:  Vitals: /66   Pulse 95   Ht 1.702 m (5' 7\")   Wt 75.3 kg (165 lb 14.4 oz)   LMP  (LMP Unknown)   SpO2 97%   BMI 25.98 kg/m      Constitutional:  cooperative;well nourished;in no acute distress        Skin:  warm and dry to the touch          Head:  normocephalic        Eyes:  pupils equal and round        Lymph:      ENT:  no pallor or cyanosis        Neck:  carotid pulses are full and equal bilaterally        Respiratory:  clear to auscultation;normal symmetry         Cardiac: regular rhythm;no murmurs, gallops or rubs detected                pulses full and equal                                        GI:  abdomen soft;no bruits        Extremities and Muscular Skeletal:  no deformities, clubbing, cyanosis, erythema observed;no edema              Neurological:  no gross motor deficits;affect appropriate        Psych:  Alert and Oriented x 3          CC  No referring provider defined for this encounter.                  "

## 2021-08-13 NOTE — PROGRESS NOTES
Service Date: 08/13/2021    CLINIC FOLLOWUP VISIT    REFERRING PROVIDER:  [facility]    HISTORY OF PRESENT ILLNESS:  Ms. Pinzon is a very pleasant 76-year-old female with a history of hypertension, paroxysmal atrial fibrillation.  She returns to clinic today with a recurrent episode of atrial fibrillation with heart rate in the 150s.  She does note she has been quite active in the last 6 weeks.  She has had family and grandchildren over which have kept her quite busy and quite tired.  Before this, she had seen her primary who had recommended monitoring her blood pressure because they have been creeping up a little bit.  Unfortunately, with all of the activity in her home with the grandchildren, she had not been doing that on a regular basis.  She has, however, in the last 3-4 days and her blood pressure measurements have been between 130s-150s systolic.  She had one episode of rapid heart rate with a heart rate in the 150s, according to her Fitbit, which she felt was likely atrial fibrillation.  This self-limited and she did spontaneously convert but it prompted her to make this appointment today.  She is not experiencing any chest pain or shortness of breath and she has continued to be quite active as above.    PHYSICAL EXAMINATION:    VITAL SIGNS:  Today, her blood pressure is 132/66, pulse is 95.  Weight is 165 pounds, body mass index of 25.  CARDIOVASCULAR:  Tones today were regular and fast but suggestive of a normal sinus rhythm.  I did not appreciate a murmur, gallop or rub.    RESPIRATORY:  Lungs are clear.    EXTREMITIES:  She has noted trace edema in the left ankle but she does have a history of varicose veins and had vein stripping on the right but not the left.  It has been very mild.      SUMMARY:  Ms. Pinzon is a very pleasant 76-year-old female with hypertension that appears maybe not well controlled and recurrence of paroxysmal atrial fibrillation.  We talked about management strategies for  atrial fibrillation today including rhythm control versus rate control.  Our current strategy has been to control her blood pressure and hopefully, diltiazem will also control the heart rate if she goes into atrial fibrillation.  She has had increases in both her blood pressure recently and rapid heart rate, so after discussion, we talked about simply increasing her diltiazem dose from 120 to 180 mg.  We will now transition her to the extended release 24-hour capsule from the 12-hour capsule with this and see if that helps control her blood pressure a little bit better and then hopefully also these episodes of atrial fibrillation.  She is continuing on Xarelto for CVA prophylaxis without difficulty.  I will be happy to continue to see her annually or as needed.  She will contact me if she has any recurrence or any issues with the increase in the diltiazem prescription which I provided her today.    Please feel free to contact me with any questions you have in regards to her care.    Genny Oro DO     cc:  [facility]    Genny Oro DO        D: 2021   T: 2021   MT: jak    Name:     ARIANA ROBB  MRN:      [id number]        Account:      [id number]   :      1945           Service Date: 2021       Document: [id number]

## 2021-08-16 ENCOUNTER — MYC MEDICAL ADVICE (OUTPATIENT)
Dept: CARDIOLOGY | Facility: CLINIC | Age: 76
End: 2021-08-16

## 2021-10-10 ENCOUNTER — HEALTH MAINTENANCE LETTER (OUTPATIENT)
Age: 76
End: 2021-10-10

## 2021-12-24 ENCOUNTER — OFFICE VISIT (OUTPATIENT)
Dept: URGENT CARE | Facility: URGENT CARE | Age: 76
End: 2021-12-24
Payer: COMMERCIAL

## 2021-12-24 VITALS
SYSTOLIC BLOOD PRESSURE: 132 MMHG | DIASTOLIC BLOOD PRESSURE: 62 MMHG | RESPIRATION RATE: 16 BRPM | TEMPERATURE: 97.7 F | HEART RATE: 74 BPM | OXYGEN SATURATION: 98 %

## 2021-12-24 DIAGNOSIS — R05.9 COUGH: Primary | ICD-10-CM

## 2021-12-24 PROCEDURE — 99203 OFFICE O/P NEW LOW 30 MIN: CPT | Performed by: PHYSICIAN ASSISTANT

## 2021-12-24 RX ORDER — ALBUTEROL SULFATE 90 UG/1
2 AEROSOL, METERED RESPIRATORY (INHALATION) EVERY 6 HOURS PRN
Qty: 18 G | Refills: 0 | Status: SHIPPED | OUTPATIENT
Start: 2021-12-24 | End: 2022-08-22

## 2021-12-25 NOTE — PATIENT INSTRUCTIONS

## 2021-12-25 NOTE — PROGRESS NOTES
Assessment & Plan     Cough  Acute problem.  On exam she is in no respiratory distress. O2 sat 98 % on RA. Lungs are clear.  Discussed most likely viral illness.  OTC cough medication as needed.  Albuterol inhaler prescribed as needed for chest tightness.  Keep monitoring symptoms.  Follow-up if any worsening symptoms.  Patient agrees with the plan.  - albuterol (PROAIR HFA/PROVENTIL HFA/VENTOLIN HFA) 108 (90 Base) MCG/ACT inhaler  Dispense: 18 g; Refill: 0         Return in about 1 week (around 2021) for Symptoms failing to improve.    [facility]IMANSHU [facility] CHRISTIE Beck is a 76 year old female who presents to clinic today for the following health issues:  Chief Complaint   Patient presents with     Urgent Care     URI     coughing, ear pain x 3-4 days.      HPI    Patient with history of HTN and A-fib on Xarelto presenting to urgent care today with a c/o cough.    URI Adult    Onset of symptoms was 4 day(s) ago.  Course of illness is same.    Severity moderate  Current and Associated symptoms: cough - non-productive, ear pain, slight chest tightness  Denies; fever, chills, body aches, SOB, CP, n/v, HA  Treatment measures tried include:  albuterol inhaler.  Predisposing factors include ill contact: grandson had cold symptoms earlier this week.  Vaccinated and booster shot for Covid.  Flu shot-- yes.  Bought corcidin HBP, delsym today-- did not take yet  Home covid test is negative yesterday    Review of Systems  Constitutional, HEENT, cardiovascular, pulmonary, GI, , musculoskeletal, neuro, skin, endocrine and psych systems are negative, except as otherwise noted.      Objective    /62   Pulse 74   Temp 97.7  F (36.5  C) (Tympanic)   Resp 16   LMP  (LMP Unknown)   SpO2 98%   Physical Exam   GENERAL: healthy, alert and no distress  HENT: ear canals and TM's normal,  mouth without ulcers or lesions, throat is moist and pink  RESP: lungs clear  to auscultation - no rales, rhonchi or wheezes  CV: regular rate and rhythm, normal S1 S2  MS: no gross musculoskeletal defects noted, no edema  SKIN: no suspicious lesions or rashes

## 2022-02-02 DIAGNOSIS — I48.0 PAROXYSMAL ATRIAL FIBRILLATION (H): ICD-10-CM

## 2022-04-06 DIAGNOSIS — I48.0 PAROXYSMAL ATRIAL FIBRILLATION (H): ICD-10-CM

## 2022-04-06 DIAGNOSIS — I10 ESSENTIAL HYPERTENSION: ICD-10-CM

## 2022-04-06 RX ORDER — ENALAPRIL MALEATE 20 MG/1
20 TABLET ORAL DAILY
Qty: 90 TABLET | Refills: 1 | Status: SHIPPED | OUTPATIENT
Start: 2022-04-06 | End: 2022-10-10

## 2022-04-19 ENCOUNTER — NURSE TRIAGE (OUTPATIENT)
Dept: CARDIOLOGY | Facility: CLINIC | Age: 77
End: 2022-04-19
Payer: COMMERCIAL

## 2022-04-19 DIAGNOSIS — I48.0 PAROXYSMAL ATRIAL FIBRILLATION (H): Primary | ICD-10-CM

## 2022-04-19 NOTE — TELEPHONE ENCOUNTER
RN called patient and reviewed with her the symptoms reported in the very nicely detailed triage note. Patient confirmed for RN that her HR is currently still in the 80's and she is asymptomatic at this time. RN suggested to patient that we have her come in for OV with MCKENZIE to review her symptoms, HR and elevated BP. Patient verbalized understanding and is in agreement with plan. RN advised patient to call us back should she develop any of the symptoms described in the note below prior to her OV. Patient verbalized understanding and is in agreement with plan. RN transferred patient to scheduling to arrange apt.

## 2022-04-19 NOTE — TELEPHONE ENCOUNTER
"Received a call from the call center to discuss \"rhythm changes\".  Spoke to Ebony who says she always has intermittent A fib. She says last Saturday she had a couple of alcoholic drinks. Later on that evening, she looked at her smart watch and her heart rate was 150-160 bpm. She says it lasted about 45 minutes. She says today she was changing some bedding and noticed her heart rate was 147-157 bpm. She says she did have some coffee this morning. She said by the time she was done, she was sweating. She says she could have continued with her activities, but sat down to rest to see if that would help her heart rates go down. This episode also lasted about 45 minutes. She says her blood pressure has been high for her today as well, 154/97 after taking her morning medications-enalapril, diltiazem and xarelto. She rechecked it a minute later and was 144/91. She says she has slight intermittent lightheadedness but cannot determine if it is correlated when she is having high heart rates, but happens more in the morning. She denies chest pain or shortness of breath. She is not having high heart rates now with HR 87 bpm.  Advised a message will be sent to Dr. Oro's team for follow up.  Please call cell phone first [phone].  No ED evaluation warranted at this time.  1. DESCRIPTION: \"Please describe your heart rate or heart beat that you are having\" (e.g., fast/slow, regular/irregular, skipped or extra beats, \"palpitations\") high heart rates  2. ONSET: \"When did it start?\" (Minutes, hours or days) Last Saturday  3. DURATION: \"How long does it last\" (e.g., seconds, minutes, hours) 45 minutes  4. PATTERN \"Does it come and go, or has it been constant since it started?\" \"Does it get worse with exertion?\" \"Are you feeling it now?\" Intermittent. Not having this currently  5. TAP: \"Using your hand, can you tap out what you are feeling on a chair or table in front of you, so that I can hear?\" (Note: not all patients can do " "this)   6. HEART RATE: \"Can you tell me your heart rate?\" \"How many beats in 15 seconds?\" (Note: not all patients can do this) Currently 87 bpm per smart watch  7. RECURRENT SYMPTOM: \"Have you ever had this before?\" If so, ask: \"When was the last time?\" and \"What happened that time?\"   8. CAUSE: \"What do you think is causing the palpitations?\" A fib  9. CARDIAC HISTORY: \"Do you have any history of heart disease?\" (e.g., heart attack, angina, bypass surgery, angioplasty, arrhythmia) PAF  10. OTHER SYMPTOMS: \"Do you have any other symptoms?\" (e.g., dizziness, chest pain, sweating, difficulty breathing) sweating, slight lightheadedness at times      Additional Information    Negative: Difficulty breathing    Negative: Dizziness, lightheadedness, or weakness    Negative: Heart beating very rapidly (e.g., > 140 / minute) and present now (Exception: during exercise)    Negative: Heart beating very slowly (e.g., < 50 / minute) (Exception: athlete)    Protocols used: HEART RATE AND HEARTBEAT [id number]      "

## 2022-04-20 ENCOUNTER — LAB (OUTPATIENT)
Dept: LAB | Facility: CLINIC | Age: 77
End: 2022-04-20
Payer: COMMERCIAL

## 2022-04-20 ENCOUNTER — OFFICE VISIT (OUTPATIENT)
Dept: CARDIOLOGY | Facility: CLINIC | Age: 77
End: 2022-04-20
Attending: INTERNAL MEDICINE
Payer: COMMERCIAL

## 2022-04-20 VITALS
SYSTOLIC BLOOD PRESSURE: 138 MMHG | HEART RATE: 75 BPM | WEIGHT: 170 LBS | DIASTOLIC BLOOD PRESSURE: 66 MMHG | BODY MASS INDEX: 26.63 KG/M2 | OXYGEN SATURATION: 99 %

## 2022-04-20 DIAGNOSIS — I10 ESSENTIAL HYPERTENSION: ICD-10-CM

## 2022-04-20 DIAGNOSIS — Z79.01 ON CONTINUOUS ORAL ANTICOAGULATION: ICD-10-CM

## 2022-04-20 DIAGNOSIS — I48.0 PAROXYSMAL ATRIAL FIBRILLATION (H): Primary | ICD-10-CM

## 2022-04-20 DIAGNOSIS — I48.0 PAROXYSMAL ATRIAL FIBRILLATION (H): ICD-10-CM

## 2022-04-20 LAB
ANION GAP SERPL CALCULATED.3IONS-SCNC: 5 MMOL/L (ref 3–14)
BUN SERPL-MCNC: 18 MG/DL (ref 7–30)
CALCIUM SERPL-MCNC: 9.8 MG/DL (ref 8.5–10.1)
CHLORIDE BLD-SCNC: 108 MMOL/L (ref 94–109)
CO2 SERPL-SCNC: 26 MMOL/L (ref 20–32)
CREAT SERPL-MCNC: 0.71 MG/DL (ref 0.52–1.04)
ERYTHROCYTE [DISTWIDTH] IN BLOOD BY AUTOMATED COUNT: 12.8 % (ref 10–15)
GFR SERPL CREATININE-BSD FRML MDRD: 88 ML/MIN/1.73M2
GLUCOSE BLD-MCNC: 88 MG/DL (ref 70–99)
HCT VFR BLD AUTO: 42.8 % (ref 35–47)
HGB BLD-MCNC: 13.7 G/DL (ref 11.7–15.7)
MCH RBC QN AUTO: 30.9 PG (ref 26.5–33)
MCHC RBC AUTO-ENTMCNC: 32 G/DL (ref 31.5–36.5)
MCV RBC AUTO: 97 FL (ref 78–100)
PLATELET # BLD AUTO: 304 10E3/UL (ref 150–450)
POTASSIUM BLD-SCNC: 4.6 MMOL/L (ref 3.4–5.3)
RBC # BLD AUTO: 4.43 10E6/UL (ref 3.8–5.2)
SODIUM SERPL-SCNC: 139 MMOL/L (ref 133–144)
WBC # BLD AUTO: 7.2 10E3/UL (ref 4–11)

## 2022-04-20 PROCEDURE — 80048 BASIC METABOLIC PNL TOTAL CA: CPT | Performed by: NURSE PRACTITIONER

## 2022-04-20 PROCEDURE — 99213 OFFICE O/P EST LOW 20 MIN: CPT | Performed by: NURSE PRACTITIONER

## 2022-04-20 PROCEDURE — 93000 ELECTROCARDIOGRAM COMPLETE: CPT | Performed by: NURSE PRACTITIONER

## 2022-04-20 PROCEDURE — 85027 COMPLETE CBC AUTOMATED: CPT | Performed by: NURSE PRACTITIONER

## 2022-04-20 PROCEDURE — 36415 COLL VENOUS BLD VENIPUNCTURE: CPT | Performed by: NURSE PRACTITIONER

## 2022-04-20 RX ORDER — DILTIAZEM HYDROCHLORIDE 180 MG/1
180 CAPSULE, EXTENDED RELEASE ORAL 2 TIMES DAILY
Qty: 180 CAPSULE | Refills: 3 | Status: SHIPPED | OUTPATIENT
Start: 2022-04-20 | End: 2023-04-17

## 2022-04-20 RX ORDER — SENNOSIDES 8.6 MG
650 CAPSULE ORAL EVERY 8 HOURS PRN
COMMUNITY
End: 2024-02-27

## 2022-04-20 NOTE — PATIENT INSTRUCTIONS
Please increase your diltiazem 180 mg twice daily.    Please continue to monitor your blood pressure and heart rate.    Goal heart rate is between  bpm.    Goal blood pressure less than 130/80 mmHg.        If you have problems or questions please call the RNs ([facility]) at [phone]      
n/a

## 2022-04-20 NOTE — PROGRESS NOTES
Cardiology Clinic Progress Note    Service Date: 04/20/22    Primary Cardiologist: Dr Oro      Reason for Visit: Patient requested visit due to elevated blood pressure and heart rate    HPI:   I had the pleasure of seeing Ms. Ebony Pinzon in the clinic today and she is a very pleasant 76 year old female with a past medical history notable for    1. Hypertension  2. Paroxysmal atrial fibrillation    Diagnostics  I have personally reviewed the following reports  Echo (2020)    1. Normal left ventricular size and systolic function. LVEF 55-60%.  2. No regional wall motion abnormalities.  3. Normal right ventricular size and systolic function.  4. No significant valve disease.       In August 2021, patient saw Dr Oro with concerns of recurrent atrial fibrillation with heart rates in the 150s.  In addition her blood pressure was increasing.  Her diltiazem was increased from 120 mg to 180 mg daily.  She was tolerating her Xarelto    On 4/19/22, she called the clinic stating she has had intermittent high heart rates for the past 3 to 4 days    Today, she presents in normal rhythm however over the past few days she has noticed atrial fibrillation on her apple watch and noted palpitations, more intermittent diaphoresis.  This is her second time having atrial fibrillation.  She denies chest pain, shortness of breath, dyspnea on exertion, orthopnea, PND, lower extremity edema, dizziness.   Reports taking medications as prescribed including xarelto and denies bleeding and sign/symptoms of stroke.     ASSESSMENT AND PLAN:    Symptomatic paroxysmal atrial fibrillation.    For rate control she is taking diltiazem 180 mg daily with recurrence of atrial fibrillation.  Today's ECG revealed sinus rhythm with ventricular rate of 78 bpm.  Will have her establish with EP to discuss rhythm vs rate control.      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  Her last  hemoglobin was 10.6 (3/2021)    Hypertension    controlled while taking diltiazem 180 mg daily, enalapril 20 mg daily    Plan:    Increase diltiazem 180 mg twice daily    Labs today    ECHO     Follow up with EP MD     Please call the clinic if questions or problems arise      Thank you for the opportunity to participate in this pleasant patient's care. We would be happy to see her sooner if needed for any concerns in the meantime.         YANIV Guillaume CNP  [facility] Heart  Text Page  (M-F 8:00 am - 4:30 pm)    Orders this Visit:  Orders Placed This Encounter   Procedures     CBC with platelets     Basic metabolic panel     Follow-Up with Cardiology- EP     EKG 12-lead complete w/read - Clinics (performed today)     Echocardiogram Complete     Orders Placed This Encounter   Medications     acetaminophen (TYLENOL 8 HOUR ARTHRITIS PAIN) 650 MG CR tablet     Sig: Take 650 mg by mouth every 8 hours as needed for mild pain or fever     Methylcellulose, Laxative, (FIBER THERAPY PO)     diltiazem ER (TIAZAC) 180 MG 24 hr ER beaded capsule     Sig: Take 1 capsule (180 mg) by mouth 2 times daily     Dispense:  180 capsule     Refill:  3     Pt will call with refill     Medications Discontinued During This Encounter   Medication Reason     diltiazem ER (TIAZAC) 180 MG 24 hr ER beaded capsule Reorder     Encounter Diagnoses   Name Primary?     Paroxysmal atrial fibrillation (H)      Essential hypertension      On continuous oral anticoagulation Yes       CURRENT MEDICATIONS:  Current Outpatient Medications   Medication Sig Dispense Refill     acetaminophen (TYLENOL 8 HOUR ARTHRITIS PAIN) 650 MG CR tablet Take 650 mg by mouth every 8 hours as needed for mild pain or fever       acetaminophen (TYLENOL) 325 MG tablet Take 325-650 mg by mouth every 6 hours as needed for mild pain       albuterol (PROAIR HFA/PROVENTIL HFA/VENTOLIN HFA) 108 (90 Base) MCG/ACT inhaler Inhale 2 puffs into the lungs every 6 hours as needed for  other (cough) 18 g 0     Calcium Carb-Cholecalciferol (CALCIUM 500+D3 PO) Take by mouth daily       CRANBERRY PO Take by mouth daily       diltiazem ER (TIAZAC) 180 MG 24 hr ER beaded capsule Take 1 capsule (180 mg) by mouth 2 times daily 180 capsule 3     enalapril (VASOTEC) 20 MG tablet Take 1 tablet (20 mg) by mouth daily 90 tablet 1     Methylcellulose, Laxative, (FIBER THERAPY PO)        multivitamin (CENTRUM SILVER) tablet Take 1 tablet by mouth daily       omeprazole (PRILOSEC) 20 MG DR capsule Take 20 mg by mouth daily       Probiotic Product (PROBIOTIC ADVANCED PO)        rivaroxaban ANTICOAGULANT (XARELTO ANTICOAGULANT) 20 MG TABS tablet Take 1 tablet (20 mg) by mouth daily (with dinner) 90 tablet 1     estradiol (ESTRACE) 0.1 MG/GM vaginal cream          ALLERGIES  Allergies   Allergen Reactions     Augmentin      Other reaction(s): GI intolerance  C-Diff     Codeine Nausea       PAST MEDICAL, SURGICAL, SOCIAL FAMILY HISTORY:  History was reviewed and updated as needed, see medical record.    Review of Systems:  Skin:  Negative     Eyes:  Negative    ENT:  Negative    Respiratory:  Positive for dyspnea on exertion;shortness of breath  Cardiovascular:  Negative    Gastroenterology: Negative    Genitourinary:  Negative    Musculoskeletal:  Negative    Neurologic:  Negative    Psychiatric:  Negative    Heme/Lymph/Imm:  Negative    Endocrine:  Negative       Physical Exam:  Vitals: /66 (BP Location: Right arm, Patient Position: Sitting)   Pulse 75   Wt 77.1 kg (170 lb)   LMP  (LMP Unknown)   SpO2 99%   BMI 26.63 kg/m     Wt Readings from Last 4 Encounters:   04/20/22 77.1 kg (170 lb)   08/13/21 75.3 kg (165 lb 14.4 oz)   01/29/21 78.6 kg (173 lb 3.2 oz)   02/21/20 76.1 kg (167 lb 11.2 oz)     CONSTITUTIONAL: Appears her stated age, well nourished, and in no acute distress.  HEENT: Pupils equal, round. Sclerae nonicteric.    NECK: Supple, no masses appreciated.   C/V:  Regular rate and rhythm,  normal S1 and S2, no S3 or S4, no murmur, rub or gallop.   RESP: Respirations are unlabored. Lungs are clear to auscultation bilaterally without wheezing, rales, or rhonchi.  GI: Abdomen soft, non-tender, non-distended.  EXTREM:  No clubbing, cyanosis, or lower extremity edema bilaterally.   NEURO: Alert and oriented, cooperative. Gait steady. No gross focal deficits.   PSYCH: Affect appropriate. Mentation normal. Responds to questions appropriately.  SKIN: Warm and dry. No apparent rashes or bruising.     Recent Lab Results:  CBC RESULTS:  Lab Results   Component Value Date    WBC 9.4 01/28/2020    RBC 4.76 01/28/2020    HGB 14.4 01/28/2020    HCT 44.3 01/28/2020    MCV 93 01/28/2020    MCH 30.3 01/28/2020    MCHC 32.5 01/28/2020    RDW 12.3 01/28/2020     01/28/2020     BMP RESULTS:  Lab Results   Component Value Date     01/28/2020    POTASSIUM 3.8 01/28/2020    CHLORIDE 107 01/28/2020    CO2 28 01/28/2020    ANIONGAP 5 01/28/2020     (H) 01/28/2020    BUN 21 01/28/2020    CR 0.72 01/28/2020    GFRESTIMATED 82 01/28/2020    GFRESTBLACK >90 01/28/2020    ASHLEY 9.6 01/28/2020      CC  Genny Oro, DO  [address]  [place] [address]    This note was completed in part using Dragon voice recognition software. Although reviewed after completion, some word and grammatical errors may occur.

## 2022-04-20 NOTE — LETTER
4/20/2022    [facility]  [address] [facility] [address]    RE: Ebony Pinzon       Dear Colleague,     I had the pleasure of seeing Ebony Pinzno in the [facility].      Cardiology Clinic Progress Note    Service Date: 04/20/22    Primary Cardiologist: Dr Oro      Reason for Visit: Patient requested visit due to elevated blood pressure and heart rate    HPI:   I had the pleasure of seeing Ms. Ebony Pinzon in the clinic today and she is a very pleasant 76 year old female with a past medical history notable for    1. Hypertension  2. Paroxysmal atrial fibrillation    Diagnostics  I have personally reviewed the following reports  Echo (2020)    1. Normal left ventricular size and systolic function. LVEF 55-60%.  2. No regional wall motion abnormalities.  3. Normal right ventricular size and systolic function.  4. No significant valve disease.       In August 2021, patient saw Dr Oro with concerns of recurrent atrial fibrillation with heart rates in the 150s.  In addition her blood pressure was increasing.  Her diltiazem was increased from 120 mg to 180 mg daily.  She was tolerating her Xarelto    On 4/19/22, she called the clinic stating she has had intermittent high heart rates for the past 3 to 4 days    Today, she presents in normal rhythm however over the past few days she has noticed atrial fibrillation on her apple watch and noted palpitations, more intermittent diaphoresis.  This is her second time having atrial fibrillation.  She denies chest pain, shortness of breath, dyspnea on exertion, orthopnea, PND, lower extremity edema, dizziness.   Reports taking medications as prescribed including xarelto and denies bleeding and sign/symptoms of stroke.     ASSESSMENT AND PLAN:    Symptomatic paroxysmal atrial fibrillation.    For rate control she is taking diltiazem 180 mg daily with recurrence of atrial fibrillation.  Today's ECG  revealed sinus rhythm with ventricular rate of 78 bpm.  Will have her establish with EP to discuss rhythm vs rate control.      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  Her last hemoglobin was 10.6 (3/2021)    Hypertension    controlled while taking diltiazem 180 mg daily, enalapril 20 mg daily    Plan:    Increase diltiazem 180 mg twice daily    Labs today    ECHO     Follow up with EP MD     Please call the clinic if questions or problems arise      Thank you for the opportunity to participate in this pleasant patient's care. We would be happy to see her sooner if needed for any concerns in the meantime.         YANIV Guillaume CNP  [facility] [[email or web address]] Heart  Text Page  (M-F 8:00 am - 4:30 pm)    Orders this Visit:  Orders Placed This Encounter   Procedures     CBC with platelets     Basic metabolic panel     Follow-Up with Cardiology- EP     EKG 12-lead complete w/read - Clinics (performed today)     Echocardiogram Complete     Orders Placed This Encounter   Medications     acetaminophen (TYLENOL 8 HOUR ARTHRITIS PAIN) 650 MG CR tablet     Sig: Take 650 mg by mouth every 8 hours as needed for mild pain or fever     Methylcellulose, Laxative, (FIBER THERAPY PO)     diltiazem ER (TIAZAC) 180 MG 24 hr ER beaded capsule     Sig: Take 1 capsule (180 mg) by mouth 2 times daily     Dispense:  180 capsule     Refill:  3     Pt will call with refill     Medications Discontinued During This Encounter   Medication Reason     diltiazem ER (TIAZAC) 180 MG 24 hr ER beaded capsule Reorder     Encounter Diagnoses   Name Primary?     Paroxysmal atrial fibrillation (H)      Essential hypertension      On continuous oral anticoagulation Yes       CURRENT MEDICATIONS:  Current Outpatient Medications   Medication Sig Dispense Refill     acetaminophen (TYLENOL 8 HOUR ARTHRITIS PAIN) 650 MG CR tablet Take 650 mg by mouth every 8 hours as needed for mild pain or fever        acetaminophen (TYLENOL) 325 MG tablet Take 325-650 mg by mouth every 6 hours as needed for mild pain       albuterol (PROAIR HFA/PROVENTIL HFA/VENTOLIN HFA) 108 (90 Base) MCG/ACT inhaler Inhale 2 puffs into the lungs every 6 hours as needed for other (cough) 18 g 0     Calcium Carb-Cholecalciferol (CALCIUM 500+D3 PO) Take by mouth daily       CRANBERRY PO Take by mouth daily       diltiazem ER (TIAZAC) 180 MG 24 hr ER beaded capsule Take 1 capsule (180 mg) by mouth 2 times daily 180 capsule 3     enalapril (VASOTEC) 20 MG tablet Take 1 tablet (20 mg) by mouth daily 90 tablet 1     Methylcellulose, Laxative, (FIBER THERAPY PO)        multivitamin (CENTRUM SILVER) tablet Take 1 tablet by mouth daily       omeprazole (PRILOSEC) 20 MG DR capsule Take 20 mg by mouth daily       Probiotic Product (PROBIOTIC ADVANCED PO)        rivaroxaban ANTICOAGULANT (XARELTO ANTICOAGULANT) 20 MG TABS tablet Take 1 tablet (20 mg) by mouth daily (with dinner) 90 tablet 1     estradiol (ESTRACE) 0.1 MG/GM vaginal cream          ALLERGIES  Allergies   Allergen Reactions     Augmentin      Other reaction(s): GI intolerance  C-Diff     Codeine Nausea       PAST MEDICAL, SURGICAL, SOCIAL FAMILY HISTORY:  History was reviewed and updated as needed, see medical record.    Review of Systems:  Skin:  Negative     Eyes:  Negative    ENT:  Negative    Respiratory:  Positive for dyspnea on exertion;shortness of breath  Cardiovascular:  Negative    Gastroenterology: Negative    Genitourinary:  Negative    Musculoskeletal:  Negative    Neurologic:  Negative    Psychiatric:  Negative    Heme/Lymph/Imm:  Negative    Endocrine:  Negative       Physical Exam:  Vitals: /66 (BP Location: Right arm, Patient Position: Sitting)   Pulse 75   Wt 77.1 kg (170 lb)   LMP  (LMP Unknown)   SpO2 99%   BMI 26.63 kg/m     Wt Readings from Last 4 Encounters:   04/20/22 77.1 kg (170 lb)   08/13/21 75.3 kg (165 lb 14.4 oz)   01/29/21 78.6 kg (173 lb 3.2 oz)    02/21/20 76.1 kg (167 lb 11.2 oz)     CONSTITUTIONAL: Appears her stated age, well nourished, and in no acute distress.  HEENT: Pupils equal, round. Sclerae nonicteric.    NECK: Supple, no masses appreciated.   C/V:  Regular rate and rhythm, normal S1 and S2, no S3 or S4, no murmur, rub or gallop.   RESP: Respirations are unlabored. Lungs are clear to auscultation bilaterally without wheezing, rales, or rhonchi.  GI: Abdomen soft, non-tender, non-distended.  EXTREM:  No clubbing, cyanosis, or lower extremity edema bilaterally.   NEURO: Alert and oriented, cooperative. Gait steady. No gross focal deficits.   PSYCH: Affect appropriate. Mentation normal. Responds to questions appropriately.  SKIN: Warm and dry. No apparent rashes or bruising.     Recent Lab Results:  CBC RESULTS:  Lab Results   Component Value Date    WBC 9.4 01/28/2020    RBC 4.76 01/28/2020    HGB 14.4 01/28/2020    HCT 44.3 01/28/2020    MCV 93 01/28/2020    MCH 30.3 01/28/2020    MCHC 32.5 01/28/2020    RDW 12.3 01/28/2020     01/28/2020     BMP RESULTS:  Lab Results   Component Value Date     01/28/2020    POTASSIUM 3.8 01/28/2020    CHLORIDE 107 01/28/2020    CO2 28 01/28/2020    ANIONGAP 5 01/28/2020     (H) 01/28/2020    BUN 21 01/28/2020    CR 0.72 01/28/2020    GFRESTIMATED 82 01/28/2020    GFRESTBLACK >90 01/28/2020    ASHLEY 9.6 01/28/2020      CC  Genny Oro, DO  [address]  [place] [address]    This note was completed in part using Dragon voice recognition software. Although reviewed after completion, some word and grammatical errors may occur.    Thank you for allowing me to participate in the care of your patient.      Sincerely,   YANIV Guillaume [facility]

## 2022-05-04 ENCOUNTER — HOSPITAL ENCOUNTER (OUTPATIENT)
Dept: CARDIOLOGY | Facility: CLINIC | Age: 77
Discharge: HOME | End: 2022-05-04
Attending: NURSE PRACTITIONER | Admitting: NURSE PRACTITIONER
Payer: COMMERCIAL

## 2022-05-04 DIAGNOSIS — I10 ESSENTIAL HYPERTENSION: ICD-10-CM

## 2022-05-04 DIAGNOSIS — I48.0 PAROXYSMAL ATRIAL FIBRILLATION (H): ICD-10-CM

## 2022-05-04 DIAGNOSIS — Z79.01 ON CONTINUOUS ORAL ANTICOAGULATION: ICD-10-CM

## 2022-05-04 LAB — LVEF ECHO: NORMAL

## 2022-05-04 PROCEDURE — 93306 TTE W/DOPPLER COMPLETE: CPT | Mod: 26 | Performed by: INTERNAL MEDICINE

## 2022-05-04 PROCEDURE — 93306 TTE W/DOPPLER COMPLETE: CPT

## 2022-05-18 ENCOUNTER — OFFICE VISIT (OUTPATIENT)
Dept: CARDIOLOGY | Facility: CLINIC | Age: 77
End: 2022-05-18
Attending: NURSE PRACTITIONER
Payer: COMMERCIAL

## 2022-05-18 VITALS
SYSTOLIC BLOOD PRESSURE: 147 MMHG | DIASTOLIC BLOOD PRESSURE: 75 MMHG | OXYGEN SATURATION: 97 % | BODY MASS INDEX: 26.21 KG/M2 | WEIGHT: 167 LBS | HEIGHT: 67 IN | HEART RATE: 68 BPM

## 2022-05-18 DIAGNOSIS — I10 ESSENTIAL HYPERTENSION: ICD-10-CM

## 2022-05-18 DIAGNOSIS — I48.0 PAROXYSMAL ATRIAL FIBRILLATION (H): ICD-10-CM

## 2022-05-18 DIAGNOSIS — Z79.01 ON CONTINUOUS ORAL ANTICOAGULATION: ICD-10-CM

## 2022-05-18 PROCEDURE — 99204 OFFICE O/P NEW MOD 45 MIN: CPT | Performed by: INTERNAL MEDICINE

## 2022-05-18 PROCEDURE — 93000 ELECTROCARDIOGRAM COMPLETE: CPT | Performed by: INTERNAL MEDICINE

## 2022-05-18 RX ORDER — FLECAINIDE ACETATE 100 MG/1
100 TABLET ORAL 2 TIMES DAILY
Qty: 180 TABLET | Refills: 3 | Status: SHIPPED | OUTPATIENT
Start: 2022-05-18 | End: 2023-04-27

## 2022-05-18 NOTE — PROGRESS NOTES
HPI and Plan:   See dictation        Orders Placed This Encounter   Procedures     Follow-Up with Cardiology EP     EKG 12-lead complete w/read - Clinics (performed today)     EKG 12-lead complete w/read (Future)- to be scheduled     EKG 12-lead complete w/read (Future)- to be scheduled       Orders Placed This Encounter   Medications     flecainide (TAMBOCOR) 100 MG tablet     Sig: Take 1 tablet (100 mg) by mouth 2 times daily     Dispense:  180 tablet     Refill:  3       There are no discontinued medications.      Encounter Diagnoses   Name Primary?     Paroxysmal atrial fibrillation (H)      Essential hypertension      On continuous oral anticoagulation        CURRENT MEDICATIONS:  Current Outpatient Medications   Medication Sig Dispense Refill     acetaminophen (TYLENOL) 325 MG tablet Take 325-650 mg by mouth every 6 hours as needed for mild pain       acetaminophen (TYLENOL) 650 MG CR tablet Take 650 mg by mouth every 8 hours as needed for mild pain or fever       albuterol (PROAIR HFA/PROVENTIL HFA/VENTOLIN HFA) 108 (90 Base) MCG/ACT inhaler Inhale 2 puffs into the lungs every 6 hours as needed for other (cough) 18 g 0     Calcium Carb-Cholecalciferol (CALCIUM 500+D3 PO) Take by mouth daily       CRANBERRY PO Take by mouth daily       diltiazem ER (TIAZAC) 180 MG 24 hr ER beaded capsule Take 1 capsule (180 mg) by mouth 2 times daily 180 capsule 3     enalapril (VASOTEC) 20 MG tablet Take 1 tablet (20 mg) by mouth daily 90 tablet 1     estradiol (ESTRACE) 0.1 MG/GM vaginal cream        flecainide (TAMBOCOR) 100 MG tablet Take 1 tablet (100 mg) by mouth 2 times daily 180 tablet 3     Methylcellulose, Laxative, (FIBER THERAPY PO)        multivitamin (CENTRUM SILVER) tablet Take 1 tablet by mouth daily       omeprazole (PRILOSEC) 20 MG DR capsule Take 20 mg by mouth daily       Probiotic Product (PROBIOTIC ADVANCED PO)        rivaroxaban ANTICOAGULANT (XARELTO ANTICOAGULANT) 20 MG TABS tablet Take 1 tablet (20  "mg) by mouth daily (with dinner) 90 tablet 1       ALLERGIES     Allergies   Allergen Reactions     Augmentin      Other reaction(s): GI intolerance  C-Diff     Codeine Nausea       PAST MEDICAL HISTORY:  Past Medical History:   Diagnosis Date     Irritable bowel syndrome      Paroxysmal A-fib (H) 02/21/2020       PAST SURGICAL HISTORY:  History reviewed. No pertinent surgical history.    FAMILY HISTORY:  Family History   Problem Relation Age of Onset     Breast Cancer Mother      Atrial fibrillation Father      Other - See Comments Father         enlarged heart; leaky heart valve     Cataracts Sister      No Known Problems Brother      No Known Problems Sister      No Known Problems Brother      Arthritis Son      Arthritis Son        SOCIAL HISTORY:  Social History     Socioeconomic History     Marital status:      Spouse name: None     Number of children: None     Years of education: None     Highest education level: None   Tobacco Use     Smoking status: Never Smoker     Smokeless tobacco: Never Used   Substance and Sexual Activity     Alcohol use: Yes     Comment: 1 drink with supper on occaion.       Review of Systems:  Skin:  Negative       Eyes:  Negative glasses    ENT:  Negative      Respiratory:  Negative       Cardiovascular:  Negative palpitations;Positive for;edema;lightheadedness left leg and when it was very hot, occasional palpitations - can feel irregular beats, lightheadedness improved with new med dosage.  Gastroenterology: Negative heartburn treated  Genitourinary:  Negative urinary tract infection recently had three infections as of 8/13/21  Musculoskeletal:  Negative arthritis thumbs, shoulders, knee, hip  Neurologic:  Negative numbness or tingling of hands carpal tunnel  Psychiatric:  Negative      Heme/Lymph/Imm:  Negative      Endocrine:  Negative        Physical Exam:  Vitals: BP (!) 147/75   Pulse 68   Ht 1.702 m (5' 7\")   Wt 75.8 kg (167 lb)   LMP  (LMP Unknown)   SpO2 97%   " BMI 26.16 kg/m      Constitutional:  cooperative;well nourished;in no acute distress        Skin:  warm and dry to the touch          Head:  normocephalic        Eyes:  pupils equal and round        Lymph:      ENT:  no pallor or cyanosis        Neck:  carotid pulses are full and equal bilaterally        Respiratory:  clear to auscultation;normal symmetry         Cardiac: regular rhythm;no murmurs, gallops or rubs detected                pulses full and equal                                        GI:  abdomen soft;no bruits        Extremities and Muscular Skeletal:  no deformities, clubbing, cyanosis, erythema observed;no edema              Neurological:  no gross motor deficits;affect appropriate        Psych:  Alert and Oriented x 3        CC  Toshia Jernigan, APRN CNP  [address]  [place] [address]

## 2022-05-18 NOTE — LETTER
5/18/2022    [facility]  [address] [facility] [address]    RE: Ebony Pinzon       Dear Colleague,     I had the pleasure of seeing Ebony Pinzon in the [facility].  HPI and Plan:   See dictation        Orders Placed This Encounter   Procedures     Follow-Up with Cardiology EP     EKG 12-lead complete w/read - Clinics (performed today)     EKG 12-lead complete w/read (Future)- to be scheduled     EKG 12-lead complete w/read (Future)- to be scheduled       Orders Placed This Encounter   Medications     flecainide (TAMBOCOR) 100 MG tablet     Sig: Take 1 tablet (100 mg) by mouth 2 times daily     Dispense:  180 tablet     Refill:  3       There are no discontinued medications.      Encounter Diagnoses   Name Primary?     Paroxysmal atrial fibrillation (H)      Essential hypertension      On continuous oral anticoagulation        CURRENT MEDICATIONS:  Current Outpatient Medications   Medication Sig Dispense Refill     acetaminophen (TYLENOL) 325 MG tablet Take 325-650 mg by mouth every 6 hours as needed for mild pain       acetaminophen (TYLENOL) 650 MG CR tablet Take 650 mg by mouth every 8 hours as needed for mild pain or fever       albuterol (PROAIR HFA/PROVENTIL HFA/VENTOLIN HFA) 108 (90 Base) MCG/ACT inhaler Inhale 2 puffs into the lungs every 6 hours as needed for other (cough) 18 g 0     Calcium Carb-Cholecalciferol (CALCIUM 500+D3 PO) Take by mouth daily       CRANBERRY PO Take by mouth daily       diltiazem ER (TIAZAC) 180 MG 24 hr ER beaded capsule Take 1 capsule (180 mg) by mouth 2 times daily 180 capsule 3     enalapril (VASOTEC) 20 MG tablet Take 1 tablet (20 mg) by mouth daily 90 tablet 1     estradiol (ESTRACE) 0.1 MG/GM vaginal cream        flecainide (TAMBOCOR) 100 MG tablet Take 1 tablet (100 mg) by mouth 2 times daily 180 tablet 3     Methylcellulose, Laxative, (FIBER THERAPY PO)        multivitamin (CENTRUM SILVER) tablet  Take 1 tablet by mouth daily       omeprazole (PRILOSEC) 20 MG DR capsule Take 20 mg by mouth daily       Probiotic Product (PROBIOTIC ADVANCED PO)        rivaroxaban ANTICOAGULANT (XARELTO ANTICOAGULANT) 20 MG TABS tablet Take 1 tablet (20 mg) by mouth daily (with dinner) 90 tablet 1       ALLERGIES     Allergies   Allergen Reactions     Augmentin      Other reaction(s): GI intolerance  C-Diff     Codeine Nausea       PAST MEDICAL HISTORY:  Past Medical History:   Diagnosis Date     Irritable bowel syndrome      Paroxysmal A-fib (H) 02/21/2020       PAST SURGICAL HISTORY:  History reviewed. No pertinent surgical history.    FAMILY HISTORY:  Family History   Problem Relation Age of Onset     Breast Cancer Mother      Atrial fibrillation Father      Other - See Comments Father         enlarged heart; leaky heart valve     Cataracts Sister      No Known Problems Brother      No Known Problems Sister      No Known Problems Brother      Arthritis Son      Arthritis Son        SOCIAL HISTORY:  Social History     Socioeconomic History     Marital status:      Spouse name: None     Number of children: None     Years of education: None     Highest education level: None   Tobacco Use     Smoking status: Never Smoker     Smokeless tobacco: Never Used   Substance and Sexual Activity     Alcohol use: Yes     Comment: 1 drink with supper on occaion.       Review of Systems:  Skin:  Negative       Eyes:  Negative glasses    ENT:  Negative      Respiratory:  Negative       Cardiovascular:  Negative palpitations;Positive for;edema;lightheadedness left leg and when it was very hot, occasional palpitations - can feel irregular beats, lightheadedness improved with new med dosage.  Gastroenterology: Negative heartburn treated  Genitourinary:  Negative urinary tract infection recently had three infections as of 8/13/21  Musculoskeletal:  Negative arthritis thumbs, shoulders, knee, hip  Neurologic:  Negative numbness or tingling  "of hands carpal tunnel  Psychiatric:  Negative      Heme/Lymph/Imm:  Negative      Endocrine:  Negative        Physical Exam:  Vitals: BP (!) 147/75   Pulse 68   Ht 1.702 m (5' 7\")   Wt 75.8 kg (167 lb)   LMP  (LMP Unknown)   SpO2 97%   BMI 26.16 kg/m      Constitutional:  cooperative;well nourished;in no acute distress        Skin:  warm and dry to the touch          Head:  normocephalic        Eyes:  pupils equal and round        Lymph:      ENT:  no pallor or cyanosis        Neck:  carotid pulses are full and equal bilaterally        Respiratory:  clear to auscultation;normal symmetry         Cardiac: regular rhythm;no murmurs, gallops or rubs detected                pulses full and equal                                        GI:  abdomen soft;no bruits        Extremities and Muscular Skeletal:  no deformities, clubbing, cyanosis, erythema observed;no edema              Neurological:  no gross motor deficits;affect appropriate        Psych:  Alert and Oriented x 3        CC  Toshia Jernigan, YANIV CNP  [address]  [place] [address]    Thank you for allowing me to participate in the care of your patient.      Sincerely,     Melody Castro MD     [facility]  "

## 2022-05-18 NOTE — PROGRESS NOTES
Service Date: 2022    HISTORY OF PRESENT ILLNESS:  I saw Ms. Pinzon for evaluation of atrial fibrillation.  She is a 77-year-old white female with confirmed diagnosis of paroxysmal atrial fibrillation.  The main symptom is of palpitation with some shortness of breath.  The patient has been on anticoagulation.  On average, she has about 1 episode of atrial fibrillation per month.  In April, she continued to be bothered by palpitations.  The dosage of diltiazem was increased, and she seemed to feel better.  At the present time, she has no chest pain, shortness of breath or dizziness.    PAST MEDICAL HISTORY:  Remarkable for irritable bowel syndrome.  She has no history of hypertension, diabetes or other chronic medical conditions.    She does not smoke and drinks alcohol in moderate amounts.    PHYSICAL EXAMINATION:  VITAL SIGNS:  Blood pressure was 147/75, heart rate 68 beats per minute, body weight 167 pounds.  HEENT:  The eyes and ENT were unremarkable.  LUNGS:  Clear.  CARDIAC:  Rhythm was regular, and the heart sounds were normal with no murmur.  ABDOMEN:  No obesity.  EXTREMITIES:  There is no pedal edema.    EKG showed normal sinus rhythm.    ASSESSMENT AND RECOMMENDATIONS:  Ms. Layne is been bothered by recurrent palpitation from paroxysmal atrial fibrillation.  I have added flecainide 100 mg p.o. b.i.d.  She will have an EKG next week.  If she has sinus bradycardia, I would cut the dose of diltiazem back to 180 mg once a day.  If she is doing well, I will see her for followup in about 3-6 months.  For the time being, she will continue anticoagulation.    Melody Castro MD    cc:  [place] [id number] [address]  [address]    Melody Castro MD        D: 2022   T: 2022   MT: lubna    Name:     ARIANA PINZON  MRN:      [id number]        Account:      [id number]   :      1945           Service Date: 2022       Document: [id number]

## 2022-05-22 ENCOUNTER — HEALTH MAINTENANCE LETTER (OUTPATIENT)
Age: 77
End: 2022-05-22

## 2022-05-25 DIAGNOSIS — I48.0 PAROXYSMAL ATRIAL FIBRILLATION (H): ICD-10-CM

## 2022-05-25 PROCEDURE — 93000 ELECTROCARDIOGRAM COMPLETE: CPT | Performed by: INTERNAL MEDICINE

## 2022-07-21 DIAGNOSIS — I48.0 PAROXYSMAL ATRIAL FIBRILLATION (H): ICD-10-CM

## 2022-08-22 ENCOUNTER — OFFICE VISIT (OUTPATIENT)
Dept: CARDIOLOGY | Facility: CLINIC | Age: 77
End: 2022-08-22
Payer: COMMERCIAL

## 2022-08-22 VITALS
HEART RATE: 73 BPM | WEIGHT: 170.6 LBS | BODY MASS INDEX: 26.78 KG/M2 | HEIGHT: 67 IN | DIASTOLIC BLOOD PRESSURE: 73 MMHG | SYSTOLIC BLOOD PRESSURE: 132 MMHG

## 2022-08-22 DIAGNOSIS — I48.0 PAROXYSMAL ATRIAL FIBRILLATION (H): ICD-10-CM

## 2022-08-22 PROCEDURE — 93000 ELECTROCARDIOGRAM COMPLETE: CPT | Performed by: INTERNAL MEDICINE

## 2022-08-22 PROCEDURE — 99213 OFFICE O/P EST LOW 20 MIN: CPT | Performed by: INTERNAL MEDICINE

## 2022-08-22 NOTE — LETTER
8/22/2022    [facility] [place]  [address] [facility] [address]    RE: Ebony Pinzon       Dear Colleague,     I had the pleasure of seeing Ebony Pinzon in the [facility].  HPI and Plan:   See dictation        Orders Placed This Encounter   Procedures     Follow-Up with Cardiology- EP     EKG 12-lead complete w/read (Future)- to be scheduled       Orders Placed This Encounter   Medications     LATANOPROST OP       Medications Discontinued During This Encounter   Medication Reason     acetaminophen (TYLENOL) 325 MG tablet      albuterol (PROAIR HFA/PROVENTIL HFA/VENTOLIN HFA) 108 (90 Base) MCG/ACT inhaler      Methylcellulose, Laxative, (FIBER THERAPY PO)          Encounter Diagnosis   Name Primary?     Paroxysmal atrial fibrillation (H)        CURRENT MEDICATIONS:  Current Outpatient Medications   Medication Sig Dispense Refill     acetaminophen (TYLENOL) 650 MG CR tablet Take 650 mg by mouth every 8 hours as needed for mild pain or fever       Calcium Carb-Cholecalciferol (CALCIUM 500+D3 PO) Take by mouth daily       CRANBERRY PO Take by mouth daily       diltiazem ER (TIAZAC) 180 MG 24 hr ER beaded capsule Take 1 capsule (180 mg) by mouth 2 times daily 180 capsule 3     enalapril (VASOTEC) 20 MG tablet Take 1 tablet (20 mg) by mouth daily 90 tablet 1     estradiol (ESTRACE) 0.1 MG/GM vaginal cream        flecainide (TAMBOCOR) 100 MG tablet Take 1 tablet (100 mg) by mouth 2 times daily 180 tablet 3     LATANOPROST OP        multivitamin (CENTRUM SILVER) tablet Take 1 tablet by mouth daily       omeprazole (PRILOSEC) 20 MG DR capsule Take 20 mg by mouth daily       Probiotic Product (PROBIOTIC ADVANCED PO)        rivaroxaban ANTICOAGULANT (XARELTO ANTICOAGULANT) 20 MG TABS tablet Take 1 tablet (20 mg) by mouth daily (with dinner) 90 tablet 3       ALLERGIES     Allergies   Allergen Reactions     Augmentin      Other reaction(s): GI  "intolerance  C-Diff     Codeine Nausea       PAST MEDICAL HISTORY:  Past Medical History:   Diagnosis Date     Irritable bowel syndrome      Paroxysmal A-fib (H) 02/21/2020       PAST SURGICAL HISTORY:  History reviewed. No pertinent surgical history.    FAMILY HISTORY:  Family History   Problem Relation Age of Onset     Breast Cancer Mother      Atrial fibrillation Father      Other - See Comments Father         enlarged heart; leaky heart valve     Cataracts Sister      No Known Problems Brother      No Known Problems Sister      No Known Problems Brother      Arthritis Son      Arthritis Son        SOCIAL HISTORY:  Social History     Socioeconomic History     Marital status:      Spouse name: None     Number of children: None     Years of education: None     Highest education level: None   Tobacco Use     Smoking status: Never Smoker     Smokeless tobacco: Never Used   Substance and Sexual Activity     Alcohol use: Yes     Comment: 1 drink with supper on occaion.       Review of Systems:  Skin:          Eyes:         ENT:         Respiratory:          Cardiovascular:         Gastroenterology:        Genitourinary:         Musculoskeletal:         Neurologic:         Psychiatric:         Heme/Lymph/Imm:         Endocrine:           Physical Exam:  Vitals: /73   Pulse 73   Ht 1.702 m (5' 7\")   Wt 77.4 kg (170 lb 9.6 oz)   LMP  (LMP Unknown)   BMI 26.72 kg/m      Constitutional:  cooperative;well nourished;in no acute distress        Skin:  warm and dry to the touch          Head:  normocephalic        Eyes:  pupils equal and round        Lymph:      ENT:  no pallor or cyanosis        Neck:  carotid pulses are full and equal bilaterally        Respiratory:  clear to auscultation;normal symmetry         Cardiac: regular rhythm;no murmurs, gallops or rubs detected                pulses full and equal                                        GI:  abdomen soft;no bruits        Extremities and Muscular " Skeletal:  no deformities, clubbing, cyanosis, erythema observed;no edema              Neurological:  no gross motor deficits;affect appropriate        Psych:  Alert and Oriented x 3        CC  Melody Castro MD  [address]  [place] [address]    Thank you for allowing me to participate in the care of your patient.      Sincerely,     Melody Castro MD     [facility]

## 2022-08-22 NOTE — PROGRESS NOTES
HPI and Plan:   See dictation        Orders Placed This Encounter   Procedures     Follow-Up with Cardiology- EP     EKG 12-lead complete w/read (Future)- to be scheduled       Orders Placed This Encounter   Medications     LATANOPROST OP       Medications Discontinued During This Encounter   Medication Reason     acetaminophen (TYLENOL) 325 MG tablet      albuterol (PROAIR HFA/PROVENTIL HFA/VENTOLIN HFA) 108 (90 Base) MCG/ACT inhaler      Methylcellulose, Laxative, (FIBER THERAPY PO)          Encounter Diagnosis   Name Primary?     Paroxysmal atrial fibrillation (H)        CURRENT MEDICATIONS:  Current Outpatient Medications   Medication Sig Dispense Refill     acetaminophen (TYLENOL) 650 MG CR tablet Take 650 mg by mouth every 8 hours as needed for mild pain or fever       Calcium Carb-Cholecalciferol (CALCIUM 500+D3 PO) Take by mouth daily       CRANBERRY PO Take by mouth daily       diltiazem ER (TIAZAC) 180 MG 24 hr ER beaded capsule Take 1 capsule (180 mg) by mouth 2 times daily 180 capsule 3     enalapril (VASOTEC) 20 MG tablet Take 1 tablet (20 mg) by mouth daily 90 tablet 1     estradiol (ESTRACE) 0.1 MG/GM vaginal cream        flecainide (TAMBOCOR) 100 MG tablet Take 1 tablet (100 mg) by mouth 2 times daily 180 tablet 3     LATANOPROST OP        multivitamin (CENTRUM SILVER) tablet Take 1 tablet by mouth daily       omeprazole (PRILOSEC) 20 MG DR capsule Take 20 mg by mouth daily       Probiotic Product (PROBIOTIC ADVANCED PO)        rivaroxaban ANTICOAGULANT (XARELTO ANTICOAGULANT) 20 MG TABS tablet Take 1 tablet (20 mg) by mouth daily (with dinner) 90 tablet 3       ALLERGIES     Allergies   Allergen Reactions     Augmentin      Other reaction(s): GI intolerance  C-Diff     Codeine Nausea       PAST MEDICAL HISTORY:  Past Medical History:   Diagnosis Date     Irritable bowel syndrome      Paroxysmal A-fib (H) 02/21/2020       PAST SURGICAL HISTORY:  History reviewed. No pertinent surgical  "history.    FAMILY HISTORY:  Family History   Problem Relation Age of Onset     Breast Cancer Mother      Atrial fibrillation Father      Other - See Comments Father         enlarged heart; leaky heart valve     Cataracts Sister      No Known Problems Brother      No Known Problems Sister      No Known Problems Brother      Arthritis Son      Arthritis Son        SOCIAL HISTORY:  Social History     Socioeconomic History     Marital status:      Spouse name: None     Number of children: None     Years of education: None     Highest education level: None   Tobacco Use     Smoking status: Never Smoker     Smokeless tobacco: Never Used   Substance and Sexual Activity     Alcohol use: Yes     Comment: 1 drink with supper on occaion.       Review of Systems:  Skin:          Eyes:         ENT:         Respiratory:          Cardiovascular:         Gastroenterology:        Genitourinary:         Musculoskeletal:         Neurologic:         Psychiatric:         Heme/Lymph/Imm:         Endocrine:           Physical Exam:  Vitals: /73   Pulse 73   Ht 1.702 m (5' 7\")   Wt 77.4 kg (170 lb 9.6 oz)   LMP  (LMP Unknown)   BMI 26.72 kg/m      Constitutional:  cooperative;well nourished;in no acute distress        Skin:  warm and dry to the touch          Head:  normocephalic        Eyes:  pupils equal and round        Lymph:      ENT:  no pallor or cyanosis        Neck:  carotid pulses are full and equal bilaterally        Respiratory:  clear to auscultation;normal symmetry         Cardiac: regular rhythm;no murmurs, gallops or rubs detected                pulses full and equal                                        GI:  abdomen soft;no bruits        Extremities and Muscular Skeletal:  no deformities, clubbing, cyanosis, erythema observed;no edema              Neurological:  no gross motor deficits;affect appropriate        Psych:  Alert and Oriented x 3        CC  Melody Catsro MD  [address]  [address]  [place] " [id number]

## 2022-08-22 NOTE — PROGRESS NOTES
Service Date: 2022    CLINIC NOTE    HISTORY OF PRESENT ILLNESS:  I saw Ms. Pinzon for followup of atrial fibrillation.  She is a 77-year-old white female with history of documented symptomatic paroxysmal atrial fibrillation.  When I saw her on , I put her on flecainide 100 mg p.o. b.i.d.  Since then, she has been doing well with no symptoms of recurrent atrial fibrillation.  She has no apparent side effects of flecainide.  She also reported that her irritable bowel syndrome has also improved.  Overall, she is happy with her current status with no complaints.    PHYSICAL EXAMINATION:    VITAL SIGNS:  Blood pressure was 132/73, heart rate 73 beats per minute, body weight 170 pounds.  VASCULAR EXAMINATION:  Showed no remarkable abnormalities.    IMAGING STUDIES:  EKG today showed sinus rhythm with QRS duration 102 milliseconds.    ASSESSMENT AND RECOMMENDATIONS:  Ms. Pinzon is doing well symptomatically.  Her blood pressure is normal and body weight is stable.  She has no apparent side effects from flecainide and she can continue the current medications and return for followup in 1 year.    Melody Castro MD     cc:  [facility]  [address] [place] [address]    Melody Castro MD        D: 2022   T: 2022   MT: jak    Name:     ARIANA PINZON  MRN:      [id number]        Account:      [id number]   :      1945           Service Date: 2022       Document: [id number]

## 2022-08-23 ENCOUNTER — MYC MEDICAL ADVICE (OUTPATIENT)
Dept: CARDIOLOGY | Facility: CLINIC | Age: 77
End: 2022-08-23

## 2022-09-18 ENCOUNTER — HEALTH MAINTENANCE LETTER (OUTPATIENT)
Age: 77
End: 2022-09-18

## 2022-10-10 DIAGNOSIS — I10 ESSENTIAL HYPERTENSION: ICD-10-CM

## 2022-10-10 DIAGNOSIS — I48.0 PAROXYSMAL ATRIAL FIBRILLATION (H): ICD-10-CM

## 2022-10-10 RX ORDER — ENALAPRIL MALEATE 20 MG/1
20 TABLET ORAL DAILY
Qty: 90 TABLET | Refills: 2 | Status: SHIPPED | OUTPATIENT
Start: 2022-10-10 | End: 2023-06-27

## 2023-04-16 DIAGNOSIS — I48.0 PAROXYSMAL ATRIAL FIBRILLATION (H): ICD-10-CM

## 2023-04-16 DIAGNOSIS — I10 ESSENTIAL HYPERTENSION: ICD-10-CM

## 2023-04-17 RX ORDER — DILTIAZEM HYDROCHLORIDE 180 MG/1
CAPSULE, EXTENDED RELEASE ORAL
Qty: 180 CAPSULE | Refills: 3 | OUTPATIENT
Start: 2023-04-17

## 2023-04-17 RX ORDER — DILTIAZEM HYDROCHLORIDE 180 MG/1
180 CAPSULE, EXTENDED RELEASE ORAL 2 TIMES DAILY
Qty: 180 CAPSULE | Refills: 1 | Status: SHIPPED | OUTPATIENT
Start: 2023-04-17 | End: 2023-09-13

## 2023-04-17 NOTE — TELEPHONE ENCOUNTER
23 [facility] Refill Guide Reviewed     Received refill request for: Tiadylt  mg BID  Last OV was: 22  Labs/EK22  F/U scheduled : ordered 2023  Rx sent to: [facility] [employer]   KHerroRN 1152 pm

## 2023-04-27 DIAGNOSIS — I48.0 PAROXYSMAL ATRIAL FIBRILLATION (H): ICD-10-CM

## 2023-04-27 RX ORDER — FLECAINIDE ACETATE 100 MG/1
100 TABLET ORAL 2 TIMES DAILY
Qty: 180 TABLET | Refills: 1 | Status: SHIPPED | OUTPATIENT
Start: 2023-04-27 | End: 2023-09-13

## 2023-06-04 ENCOUNTER — HEALTH MAINTENANCE LETTER (OUTPATIENT)
Age: 78
End: 2023-06-04

## 2023-06-13 DIAGNOSIS — I48.0 PAROXYSMAL ATRIAL FIBRILLATION (H): ICD-10-CM

## 2023-06-27 DIAGNOSIS — I48.0 PAROXYSMAL ATRIAL FIBRILLATION (H): ICD-10-CM

## 2023-06-27 DIAGNOSIS — I10 ESSENTIAL HYPERTENSION: ICD-10-CM

## 2023-06-27 RX ORDER — ENALAPRIL MALEATE 20 MG/1
20 TABLET ORAL DAILY
Qty: 90 TABLET | Refills: 0 | Status: SHIPPED | OUTPATIENT
Start: 2023-06-27 | End: 2023-09-13

## 2023-08-02 ENCOUNTER — TELEPHONE (OUTPATIENT)
Dept: CARDIOLOGY | Facility: CLINIC | Age: 78
End: 2023-08-02
Payer: COMMERCIAL

## 2023-08-02 DIAGNOSIS — I48.0 PAROXYSMAL ATRIAL FIBRILLATION (H): ICD-10-CM

## 2023-08-02 NOTE — TELEPHONE ENCOUNTER
8/2/23 refill for Xarelto sent to [facility] in [facility] . Orders placed for CBC and Cr.  Message sent to scheduling to contact peng Greenwood 422 pm

## 2023-09-05 ENCOUNTER — TRANSFERRED RECORDS (OUTPATIENT)
Dept: HEALTH INFORMATION MANAGEMENT | Facility: CLINIC | Age: 78
End: 2023-09-05

## 2023-09-05 LAB
CHOLESTEROL (EXTERNAL): 197 MG/DL (ref 90–199)
CREATININE (EXTERNAL): 0.8 MG/DL (ref 0.5–1.5)
GLUCOSE (EXTERNAL): 87 MG/DL (ref 60–115)
HDLC SERPL-MCNC: 74 MG/DL
LDL CHOLESTEROL CALCULATED (EXTERNAL): 102 MG/DL
POTASSIUM (EXTERNAL): 4.7 MMOL/L (ref 3.6–5.1)
TRIGLYCERIDES (EXTERNAL): 103 MG/DL (ref 40–149)
TSH SERPL-ACNC: 2.14 UIU/L (ref 0.27–4.2)

## 2023-09-11 ENCOUNTER — LAB (OUTPATIENT)
Dept: LAB | Facility: CLINIC | Age: 78
End: 2023-09-11
Payer: COMMERCIAL

## 2023-09-11 DIAGNOSIS — I48.0 PAROXYSMAL ATRIAL FIBRILLATION (H): ICD-10-CM

## 2023-09-11 LAB
CREAT SERPL-MCNC: 0.8 MG/DL (ref 0.51–0.95)
EGFRCR SERPLBLD CKD-EPI 2021: 75 ML/MIN/1.73M2
ERYTHROCYTE [DISTWIDTH] IN BLOOD BY AUTOMATED COUNT: 12.9 % (ref 10–15)
HCT VFR BLD AUTO: 40.6 % (ref 35–47)
HGB BLD-MCNC: 13.1 G/DL (ref 11.7–15.7)
MCH RBC QN AUTO: 31.1 PG (ref 26.5–33)
MCHC RBC AUTO-ENTMCNC: 32.3 G/DL (ref 31.5–36.5)
MCV RBC AUTO: 96 FL (ref 78–100)
PLATELET # BLD AUTO: 297 10E3/UL (ref 150–450)
RBC # BLD AUTO: 4.21 10E6/UL (ref 3.8–5.2)
WBC # BLD AUTO: 7.7 10E3/UL (ref 4–11)

## 2023-09-11 PROCEDURE — 85027 COMPLETE CBC AUTOMATED: CPT | Performed by: NURSE PRACTITIONER

## 2023-09-11 PROCEDURE — 36415 COLL VENOUS BLD VENIPUNCTURE: CPT | Performed by: NURSE PRACTITIONER

## 2023-09-11 PROCEDURE — 82565 ASSAY OF CREATININE: CPT | Performed by: NURSE PRACTITIONER

## 2023-09-13 ENCOUNTER — OFFICE VISIT (OUTPATIENT)
Dept: CARDIOLOGY | Facility: CLINIC | Age: 78
End: 2023-09-13
Payer: COMMERCIAL

## 2023-09-13 VITALS — SYSTOLIC BLOOD PRESSURE: 132 MMHG | BODY MASS INDEX: 26.63 KG/M2 | DIASTOLIC BLOOD PRESSURE: 68 MMHG | WEIGHT: 170 LBS

## 2023-09-13 DIAGNOSIS — I10 ESSENTIAL HYPERTENSION: ICD-10-CM

## 2023-09-13 DIAGNOSIS — I48.0 PAROXYSMAL ATRIAL FIBRILLATION (H): Primary | ICD-10-CM

## 2023-09-13 PROCEDURE — 99214 OFFICE O/P EST MOD 30 MIN: CPT | Performed by: NURSE PRACTITIONER

## 2023-09-13 PROCEDURE — 93000 ELECTROCARDIOGRAM COMPLETE: CPT | Performed by: NURSE PRACTITIONER

## 2023-09-13 RX ORDER — DILTIAZEM HYDROCHLORIDE 180 MG/1
360 CAPSULE, EXTENDED RELEASE ORAL DAILY
Qty: 180 CAPSULE | Refills: 3 | Status: SHIPPED | OUTPATIENT
Start: 2023-09-13 | End: 2023-10-18

## 2023-09-13 RX ORDER — FLECAINIDE ACETATE 100 MG/1
100 TABLET ORAL 2 TIMES DAILY
Qty: 180 TABLET | Refills: 3 | Status: SHIPPED | OUTPATIENT
Start: 2023-09-13

## 2023-09-13 RX ORDER — ENALAPRIL MALEATE 20 MG/1
20 TABLET ORAL DAILY
Qty: 90 TABLET | Refills: 3 | Status: SHIPPED | OUTPATIENT
Start: 2023-09-13 | End: 2024-06-26

## 2023-09-13 NOTE — PATIENT INSTRUCTIONS
Today's Recommendations    Take 2 diltiazem in the morning  Move enalapril to the evening  Continue all other medications without changes.  Please follow up with cardiology in 1 year.    Please send Egully message or call [phone] to the RN team with questions or concerns.     Scheduling and after hours number [phone]    YANIV Lang, CNP

## 2023-09-13 NOTE — PROGRESS NOTES
Electrophysiology Clinic Progress Note  Ebony Pinzon MRN# [id number]   YOB: 1945 Age: 78 year old     Primary cardiologist: Dr. Oro (general) and previously Dr. Castro ()     Reason for visit: Annual follow up     History of presenting illness:    Ebony Pinzon is a pleasant 78 year old patient with past medical history significant for:    Symptomatic paroxysmal atrial fibrillation: maintained on flecainide 100 mg BID since 5/2018  [id number] Score 3 (age ++, HTN, gender) anticoagulated on Xarelto 20 mg daily  Hypertension  IBS  Venous insufficiency with history of right lower extremity vein treatment at [facility]    Today she returns for an annual office visit.  She is doing overall well without any cardiopulmonary's concerns.  We discussed the finding of her EKG today as well as her previous echocardiogram.  Her biggest concern is around bilateral lower extremity edema that is progressive throughout the day.  She reports that she had a history of right lower extremity vein treatment at [facility] several years ago and is currently compliant with compression stockings.    Diagnotic studies:  EKG (9/13/2023): Sinus rhythm at 69 bpm with TN interval of 169, QRS duration of 98 and QTc of 415.  Echocardiogram (5/2022): LVEF of 60 to 65% without wall motion or valvular abnormalities.  LA was mildly dilated with mild MR.          Assessment and Plan:     ASSESSMENT:    Paroxysmal atrial fibrillation  [id number] score of 3 anticoagulated on Xarelto  Hemoglobin 13.1 and creatinine 0.8 from  9/11/2023    Hypertension  Well-controlled  Currently on enalapril 20 mg daily, diltiazem  mg twice daily    PLAN:     Transition diltiazem ER to 360 mg daily from 100 mg twice daily as it is the extended release preparation  Move enalapril to the p.m. to prevent hypotension  Continue Xarelto indefinitely  Return to clinic in 1 year or sooner if needed       Orders this Visit:  Orders Placed This Encounter    Procedures    Follow-Up with Cardiology MCKENZIE    EKG 12-lead complete w/read - Clinics (performed today)     Orders Placed This Encounter   Medications    flecainide (TAMBOCOR) 100 MG tablet     Sig: Take 1 tablet (100 mg) by mouth 2 times daily     Dispense:  180 tablet     Refill:  3    enalapril (VASOTEC) 20 MG tablet     Sig: Take 1 tablet (20 mg) by mouth daily     Dispense:  90 tablet     Refill:  3    diltiazem ER (TIAZAC) 180 MG 24 hr ER beaded capsule     Sig: Take 2 capsules (360 mg) by mouth daily     Dispense:  180 capsule     Refill:  3     Medications Discontinued During This Encounter   Medication Reason    diltiazem ER (TIAZAC) 180 MG 24 hr ER beaded capsule     flecainide (TAMBOCOR) 100 MG tablet Reorder (No AVS)    enalapril (VASOTEC) 20 MG tablet Reorder (No AVS)       Today's clinic visit entailed:  Review of the result(s) of each unique test - EKG, echo  Prescription drug management  30 minutes spent by me on the date of the encounter doing chart review, history and exam, documentation and further activities per the note  Provider  Link to [facility] Help Grid     The level of medical decision making during this visit was of moderate complexity.           Review of Systems:     Review of Systems:  Skin:        Eyes:       ENT:       Respiratory:  Negative    Cardiovascular:    lightheadedness;Positive for  Gastroenterology:      Genitourinary:       Musculoskeletal:       Neurologic:       Psychiatric:       Heme/Lymph/Imm:       Endocrine:  Negative thyroid disorder;diabetes            Physical Exam:     Vitals: /68   Wt 77.1 kg (170 lb)   LMP  (LMP Unknown)   BMI 26.63 kg/m    Constitutional: Well nourished and in no apparent distress.  Eyes: Pupils equal, round. Sclerae anicteric.   HEENT: Normocephalic, atraumatic.   Neck: Supple.   Respiratory: Breathing non-labored. Lungs clear to auscultation bilaterally. No crackles, wheezes, rhonchi, or rales.  Cardiovascular:  Regular rate and  rhythm, normal S1 and S2. No murmur, rub, or gallop.  Skin: Warm, dry. No rashes, cyanosis, or xanthelasma.  Extremities: Mild bilateral lower extremity edema  Neurologic: No gross motor deficits. Alert, awake, and oriented to person, place and time.  Psychiatric: Affect appropriate.        CURRENT MEDICATIONS:  Current Outpatient Medications   Medication Sig Dispense Refill    acetaminophen (TYLENOL) 650 MG CR tablet Take 650 mg by mouth every 8 hours as needed for mild pain or fever      Calcium Carb-Cholecalciferol (CALCIUM 500+D3 PO) Take by mouth daily      CRANBERRY PO Take by mouth daily      diltiazem ER (TIAZAC) 180 MG 24 hr ER beaded capsule Take 2 capsules (360 mg) by mouth daily 180 capsule 3    enalapril (VASOTEC) 20 MG tablet Take 1 tablet (20 mg) by mouth daily 90 tablet 3    estradiol (ESTRACE) 0.1 MG/GM vaginal cream       flecainide (TAMBOCOR) 100 MG tablet Take 1 tablet (100 mg) by mouth 2 times daily 180 tablet 3    LATANOPROST OP       multivitamin (CENTRUM SILVER) tablet Take 1 tablet by mouth daily      omeprazole (PRILOSEC) 20 MG DR capsule Take 20 mg by mouth daily as needed      Probiotic Product (PROBIOTIC ADVANCED PO)       rivaroxaban ANTICOAGULANT (XARELTO ANTICOAGULANT) 20 MG TABS tablet Take 1 tablet (20 mg) by mouth daily (with dinner) 90 tablet 3       ALLERGIES  Allergies   Allergen Reactions    Amoxicillin-Pot Clavulanate      Other reaction(s): GI intolerance  C-Diff    Codeine Nausea         PAST MEDICAL HISTORY:  Past Medical History:   Diagnosis Date    Irritable bowel syndrome     Paroxysmal A-fib (H) 02/21/2020       PAST SURGICAL HISTORY:  History reviewed. No pertinent surgical history.    FAMILY HISTORY:  Family History   Problem Relation Age of Onset    Breast Cancer Mother     Atrial fibrillation Father     Other - See Comments Father         enlarged heart; leaky heart valve    Cataracts Sister     No Known Problems Brother     No Known Problems Sister     No Known  Problems Brother     Arthritis Son     Arthritis Son        SOCIAL HISTORY:  Social History     Socioeconomic History    Marital status:      Spouse name: None    Number of children: None    Years of education: None    Highest education level: None   Tobacco Use    Smoking status: Never    Smokeless tobacco: Never   Substance and Sexual Activity    Alcohol use: Yes     Comment: 1 drink with supper

## 2023-12-03 ENCOUNTER — OFFICE VISIT (OUTPATIENT)
Dept: URGENT CARE | Facility: URGENT CARE | Age: 78
End: 2023-12-03
Payer: COMMERCIAL

## 2023-12-03 VITALS
TEMPERATURE: 98 F | DIASTOLIC BLOOD PRESSURE: 73 MMHG | SYSTOLIC BLOOD PRESSURE: 148 MMHG | HEART RATE: 69 BPM | OXYGEN SATURATION: 97 %

## 2023-12-03 DIAGNOSIS — N39.0 URINARY TRACT INFECTION WITHOUT HEMATURIA, SITE UNSPECIFIED: ICD-10-CM

## 2023-12-03 DIAGNOSIS — R30.0 DYSURIA: Primary | ICD-10-CM

## 2023-12-03 LAB
ALBUMIN UR-MCNC: NEGATIVE MG/DL
APPEARANCE UR: CLEAR
BACTERIA #/AREA URNS HPF: ABNORMAL /HPF
BILIRUB UR QL STRIP: NEGATIVE
COLOR UR AUTO: YELLOW
GLUCOSE UR STRIP-MCNC: NEGATIVE MG/DL
HGB UR QL STRIP: ABNORMAL
KETONES UR STRIP-MCNC: ABNORMAL MG/DL
LEUKOCYTE ESTERASE UR QL STRIP: ABNORMAL
NITRATE UR QL: POSITIVE
PH UR STRIP: 5 [PH] (ref 5–7)
RBC #/AREA URNS AUTO: ABNORMAL /HPF
SP GR UR STRIP: 1.01 (ref 1–1.03)
SQUAMOUS #/AREA URNS AUTO: ABNORMAL /LPF
UROBILINOGEN UR STRIP-ACNC: 1 E.U./DL
WBC #/AREA URNS AUTO: ABNORMAL /HPF

## 2023-12-03 PROCEDURE — 99214 OFFICE O/P EST MOD 30 MIN: CPT | Performed by: FAMILY MEDICINE

## 2023-12-03 PROCEDURE — 87086 URINE CULTURE/COLONY COUNT: CPT | Performed by: FAMILY MEDICINE

## 2023-12-03 PROCEDURE — 87186 SC STD MICRODIL/AGAR DIL: CPT | Performed by: FAMILY MEDICINE

## 2023-12-03 PROCEDURE — 81001 URINALYSIS AUTO W/SCOPE: CPT

## 2023-12-03 RX ORDER — CEFDINIR 300 MG/1
300 CAPSULE ORAL 2 TIMES DAILY
Qty: 10 CAPSULE | Refills: 0 | Status: SHIPPED | OUTPATIENT
Start: 2023-12-03 | End: 2023-12-08

## 2023-12-03 NOTE — PATIENT INSTRUCTIONS
Please finish the antibiotic even if you are feeling better.  Watch for worsening signs of infection   Drink more fluids     Porsche Andrew MD

## 2023-12-03 NOTE — PROGRESS NOTES
Chief Complaint   Patient presents with    Urgent Care     Pt states she has discomfort urinating. She also had a cough which has gotten better because she had taken given prednisone for 3 days. Pt states she stopped taking it because it was causing nosebleeds.        Ebony was seen today for urgent care.    Diagnoses and all orders for this visit:    Dysuria  -     UA Macroscopic with reflex to Microscopic and Culture - Clinic Collect  -     Urine Microscopic Exam  -     Urine Culture    Urinary tract infection without hematuria, site unspecified  -     cefdinir (OMNICEF) 300 MG capsule; Take 1 capsule (300 mg) by mouth 2 times daily for 5 days    Ebony Pinzon is a 78 year old female who presents for evaluation of uti.  This clinically is consistent with a urinary tract infection.  Urinalysis confirms the infection.  There has been no fever, back/flank pain or significant abdominal pain.  There is no clinical evidence of pyelonephritis, appendicitis, colitis, diverticulitis or any intraabdominal catastrophe. The patient will be started on antibiotics for the infection. Follow up  if increasing pain, vomiting, fever, or inability to tolerate the oral antibiotic.  Follow up with primary physician is indicated if not improving in 2-3 days. Patient voices understanding and agreement with the plan.     Results for orders placed or performed in visit on 12/03/23   UA Macroscopic with reflex to Microscopic and Culture - Clinic Collect     Status: Abnormal    Specimen: Urine, Clean Catch   Result Value Ref Range    Color Urine Yellow Colorless, Straw, Light Yellow, Yellow    Appearance Urine Clear Clear    Glucose Urine Negative Negative mg/dL    Bilirubin Urine Negative Negative    Ketones Urine Trace (A) Negative mg/dL    Specific Gravity Urine 1.015 1.003 - 1.035    Blood Urine Small (A) Negative    pH Urine 5.0 5.0 - 7.0    Protein Albumin Urine Negative Negative mg/dL    Urobilinogen Urine 1.0 0.2, 1.0  E.U./dL    Nitrite Urine Positive (A) Negative    Leukocyte Esterase Urine Moderate (A) Negative   Urine Microscopic Exam     Status: Abnormal   Result Value Ref Range    Bacteria Urine Few (A) None Seen /HPF    RBC Urine 5-10 (A) 0-2 /HPF /HPF    WBC Urine 10-25 (A) 0-5 /HPF /HPF    Squamous Epithelials Urine Few (A) None Seen /LPF       SUBJECTIVE:   Ebony Pinzon is a 78 year old female with h/o paroxysmal Afib on xarelto who  presents today for a possible UTI. Symptoms of dysuria and frequency have been going on for 1day(s).  Hematuria no.  sudden onsetand moderate.  There is no history of fever, chills, nausea or vomiting.  No history of vaginal discharge. This patient does not  have a history of urinary tract infections. Patient denies rigors, flank pain, and temperature > 101 degrees F. or vaginal discharge     Past Medical History:   Diagnosis Date    Irritable bowel syndrome     Paroxysmal A-fib (H) 02/21/2020     Current Outpatient Medications   Medication Sig Dispense Refill    cefdinir (OMNICEF) 300 MG capsule Take 1 capsule (300 mg) by mouth 2 times daily for 5 days 10 capsule 0    acetaminophen (TYLENOL) 650 MG CR tablet Take 650 mg by mouth every 8 hours as needed for mild pain or fever      Calcium Carb-Cholecalciferol (CALCIUM 500+D3 PO) Take by mouth daily      CRANBERRY PO Take by mouth daily      diltiazem ER (TIAZAC) 180 MG 24 hr ER beaded capsule Take 1 capsule (180 mg) by mouth 2 times daily 180 capsule 3    enalapril (VASOTEC) 20 MG tablet Take 1 tablet (20 mg) by mouth daily 90 tablet 3    estradiol (ESTRACE) 0.1 MG/GM vaginal cream       flecainide (TAMBOCOR) 100 MG tablet Take 1 tablet (100 mg) by mouth 2 times daily 180 tablet 3    LATANOPROST OP       multivitamin (CENTRUM SILVER) tablet Take 1 tablet by mouth daily      omeprazole (PRILOSEC) 20 MG DR capsule Take 20 mg by mouth daily as needed      Probiotic Product (PROBIOTIC ADVANCED PO)       rivaroxaban ANTICOAGULANT (XARELTO  ANTICOAGULANT) 20 MG TABS tablet Take 1 tablet (20 mg) by mouth daily (with dinner) 90 tablet 3     Social History     Tobacco Use    Smoking status: Never    Smokeless tobacco: Never   Substance Use Topics    Alcohol use: Yes     Comment: 1 drink with supper       ROS:   Review of systems negative except as stated above.    OBJECTIVE:  BP (!) 148/73 (BP Location: Right arm, Patient Position: Sitting, Cuff Size: Adult Regular)   Pulse 69   Temp 98  F (36.7  C) (Tympanic)   LMP  (LMP Unknown)   SpO2 97%   GENERAL APPEARANCE: healthy, alert and no distress  CV: regular rates and rhythm, normal S1 S2, no murmur noted  ABDOMEN:  soft, nontender, no HSM or masses and bowel sounds normal  BACK: No CVA tenderness  PSYCH: mentation appears normal      Porsche Andrew MD

## 2023-12-05 LAB — BACTERIA UR CULT: ABNORMAL

## 2023-12-16 ENCOUNTER — TRANSFERRED RECORDS (OUTPATIENT)
Dept: HEALTH INFORMATION MANAGEMENT | Facility: CLINIC | Age: 78
End: 2023-12-16
Payer: COMMERCIAL

## 2023-12-19 ENCOUNTER — MYC MEDICAL ADVICE (OUTPATIENT)
Dept: CARDIOLOGY | Facility: CLINIC | Age: 78
End: 2023-12-19
Payer: COMMERCIAL

## 2023-12-20 ENCOUNTER — HOSPITAL ENCOUNTER (EMERGENCY)
Facility: CLINIC | Age: 78
Discharge: HOME | End: 2023-12-20
Attending: EMERGENCY MEDICINE | Admitting: EMERGENCY MEDICINE
Payer: COMMERCIAL

## 2023-12-20 ENCOUNTER — APPOINTMENT (OUTPATIENT)
Dept: CT IMAGING | Facility: CLINIC | Age: 78
End: 2023-12-20
Attending: EMERGENCY MEDICINE
Payer: COMMERCIAL

## 2023-12-20 VITALS
SYSTOLIC BLOOD PRESSURE: 145 MMHG | TEMPERATURE: 97.8 F | DIASTOLIC BLOOD PRESSURE: 63 MMHG | RESPIRATION RATE: 20 BRPM | OXYGEN SATURATION: 95 % | HEART RATE: 69 BPM

## 2023-12-20 DIAGNOSIS — N89.8 VAGINAL MASS: ICD-10-CM

## 2023-12-20 DIAGNOSIS — N93.9 VAGINAL BLEEDING: ICD-10-CM

## 2023-12-20 LAB
ANION GAP SERPL CALCULATED.3IONS-SCNC: 13 MMOL/L (ref 7–15)
APTT PPP: 56 SECONDS (ref 22–38)
BASOPHILS # BLD AUTO: 0.1 10E3/UL (ref 0–0.2)
BASOPHILS NFR BLD AUTO: 1 %
BUN SERPL-MCNC: 23.5 MG/DL (ref 8–23)
CALCIUM SERPL-MCNC: 9.3 MG/DL (ref 8.8–10.2)
CHLORIDE SERPL-SCNC: 96 MMOL/L (ref 98–107)
CREAT SERPL-MCNC: 0.94 MG/DL (ref 0.51–0.95)
DEPRECATED HCO3 PLAS-SCNC: 22 MMOL/L (ref 22–29)
EGFRCR SERPLBLD CKD-EPI 2021: 62 ML/MIN/1.73M2
EOSINOPHIL # BLD AUTO: 0.3 10E3/UL (ref 0–0.7)
EOSINOPHIL NFR BLD AUTO: 2 %
ERYTHROCYTE [DISTWIDTH] IN BLOOD BY AUTOMATED COUNT: 13 % (ref 10–15)
GLUCOSE SERPL-MCNC: 104 MG/DL (ref 70–99)
HCT VFR BLD AUTO: 39.1 % (ref 35–47)
HGB BLD-MCNC: 13.1 G/DL (ref 11.7–15.7)
IMM GRANULOCYTES # BLD: 0.1 10E3/UL
IMM GRANULOCYTES NFR BLD: 1 %
INR PPP: 3.64 (ref 0.85–1.15)
LYMPHOCYTES # BLD AUTO: 3.6 10E3/UL (ref 0.8–5.3)
LYMPHOCYTES NFR BLD AUTO: 33 %
MAGNESIUM SERPL-MCNC: 1.9 MG/DL (ref 1.7–2.3)
MCH RBC QN AUTO: 31 PG (ref 26.5–33)
MCHC RBC AUTO-ENTMCNC: 33.5 G/DL (ref 31.5–36.5)
MCV RBC AUTO: 92 FL (ref 78–100)
MONOCYTES # BLD AUTO: 1.2 10E3/UL (ref 0–1.3)
MONOCYTES NFR BLD AUTO: 11 %
NEUTROPHILS # BLD AUTO: 5.9 10E3/UL (ref 1.6–8.3)
NEUTROPHILS NFR BLD AUTO: 52 %
NRBC # BLD AUTO: 0 10E3/UL
NRBC BLD AUTO-RTO: 0 /100
PLATELET # BLD AUTO: 303 10E3/UL (ref 150–450)
POTASSIUM SERPL-SCNC: 4.1 MMOL/L (ref 3.4–5.3)
RBC # BLD AUTO: 4.23 10E6/UL (ref 3.8–5.2)
SODIUM SERPL-SCNC: 131 MMOL/L (ref 135–145)
WBC # BLD AUTO: 11 10E3/UL (ref 4–11)

## 2023-12-20 PROCEDURE — 85730 THROMBOPLASTIN TIME PARTIAL: CPT | Performed by: EMERGENCY MEDICINE

## 2023-12-20 PROCEDURE — 250N000009 HC RX 250: Performed by: EMERGENCY MEDICINE

## 2023-12-20 PROCEDURE — 85041 AUTOMATED RBC COUNT: CPT | Performed by: EMERGENCY MEDICINE

## 2023-12-20 PROCEDURE — 74177 CT ABD & PELVIS W/CONTRAST: CPT

## 2023-12-20 PROCEDURE — 85018 HEMOGLOBIN: CPT | Performed by: EMERGENCY MEDICINE

## 2023-12-20 PROCEDURE — 36415 COLL VENOUS BLD VENIPUNCTURE: CPT | Performed by: EMERGENCY MEDICINE

## 2023-12-20 PROCEDURE — 99285 EMERGENCY DEPT VISIT HI MDM: CPT | Mod: 25

## 2023-12-20 PROCEDURE — 85610 PROTHROMBIN TIME: CPT | Performed by: EMERGENCY MEDICINE

## 2023-12-20 PROCEDURE — 83735 ASSAY OF MAGNESIUM: CPT | Performed by: EMERGENCY MEDICINE

## 2023-12-20 PROCEDURE — 80048 BASIC METABOLIC PNL TOTAL CA: CPT | Performed by: EMERGENCY MEDICINE

## 2023-12-20 PROCEDURE — 250N000011 HC RX IP 250 OP 636: Performed by: EMERGENCY MEDICINE

## 2023-12-20 RX ORDER — IOPAMIDOL 755 MG/ML
500 INJECTION, SOLUTION INTRAVASCULAR ONCE
Status: COMPLETED | OUTPATIENT
Start: 2023-12-20 | End: 2023-12-20

## 2023-12-20 RX ADMIN — IOPAMIDOL 85 ML: 755 INJECTION, SOLUTION INTRAVENOUS at 22:32

## 2023-12-20 RX ADMIN — SODIUM CHLORIDE 61 ML: 9 INJECTION, SOLUTION INTRAVENOUS at 22:32

## 2023-12-20 ASSESSMENT — ACTIVITIES OF DAILY LIVING (ADL)
ADLS_ACUITY_SCORE: 35
ADLS_ACUITY_SCORE: 33

## 2023-12-21 ENCOUNTER — PATIENT OUTREACH (OUTPATIENT)
Dept: ONCOLOGY | Facility: CLINIC | Age: 78
End: 2023-12-21
Payer: COMMERCIAL

## 2023-12-21 NOTE — ED PROVIDER NOTES
History   Chief Complaint:  Vaginal Bleeding     HPI   Ebony Pinzon is a 78 year old female who has a history of paroxysmal atrial fibrillation, anticoagulated on Xarelto and presents with vaginal bleeding. The patient was seen for a UTI on Thursday, and was treated with keflex.  States that prior to being diagnosed with a urinary tract infection she had noted streaks of blood in her underwear and with wiping.  She reports no associated vaginal pain.  She states that her primary care doctor had done an external exam and noted a vaginal wall mass.  She was instructed to go to the ER if she experiences any increased bleeding.      Patient states that she had increased bleeding and was seen at [facility] ER on Sunday.  While there she had blood work obtained.  She was referred to urology gynecology and is scheduled for an appointment tomorrow.  She states that when she called the urology, gynecology office they stated that she needed to see gynecology oncology.  She made an appointment with gynecology oncology and is scheduled for an appointment next week.    Hafsa she states that while at [employer] at around 1900 she was sitting and felt bleeding.  She states that prior to that she has not noted a large amount of bleeding.  She then came to the ER.  She states that she has had a persistent cough and states that she was more active today than normal and believes she may have exacerbated the bleeding.  She reports no abdominal pain.  Denies any vaginal pain.  Denies any lightheadedness or dizziness.  No recent fevers.      Independent Historian:    None    Review of External Notes:  Reviewed ER visit at [facility] on December 17.    Medications:    diltiazem ER  enalapril  estradiol   flecainide  latanoprost  omeprazole   rivaroxaban    Past Medical History:    IBS  PAF  UTIs  Hypertension  GERD    Past Surgical History:    Carpal tunnel release  Physical Exam   Patient Vitals for the past 24 hrs:   BP Temp  Temp src Pulse Resp SpO2   12/20/23 2245 (!) 145/63 -- -- -- -- 98 %   12/20/23 2130 (!) 177/73 -- -- -- -- 98 %   12/20/23 2054 -- 97.8  F (36.6  C) Temporal -- -- --   12/20/23 2050 (!) 197/96 -- -- -- -- --   12/20/23 2048 -- -- -- 88 20 99 %   Physical Exam  Vitals reviewed.   Constitutional:       General: She is not in acute distress.     Appearance: She is not ill-appearing.   HENT:      Head: Normocephalic and atraumatic.   Eyes:      Extraocular Movements: Extraocular movements intact.   Cardiovascular:      Rate and Rhythm: Normal rate and regular rhythm.   Pulmonary:      Effort: Pulmonary effort is normal. No respiratory distress.      Breath sounds: Normal breath sounds. No wheezing.   Abdominal:      Palpations: Abdomen is soft.      Tenderness: There is no abdominal tenderness. There is no guarding.   Genitourinary:     Comments: External vaginal exam done with RN as chaperone.  On the vaginal wall there is noted to be a protruding mass there is noted to be a small amount of oozing.  Musculoskeletal:      Cervical back: Normal range of motion.   Skin:     General: Skin is warm and dry.   Neurological:      Mental Status: She is alert and oriented to person, place, and time.      GCS: GCS eye subscore is 4. GCS verbal subscore is 5. GCS motor subscore is 6.   Psychiatric:         Behavior: Behavior normal.         Emergency Department Course   Imaging:  CT Abdomen Pelvis w Contrast   Final Result   IMPRESSION:    1.  3 x 3.5 x 2 cm soft tissue mass within the left vaginal wall posteriorly, suboptimally evaluated on the current exam, this likely corresponds to patient's stated history of a vaginal mass, consider further evaluation with pelvic ultrasound and/or    pelvic MRI for more complete evaluation.    2.  Cholelithiasis   3.  Moderate amount of stool seen throughout the colon, correlate for constipation    4.  Sigmoid colonic diverticulosis without evidence of diverticulitis    5.  Mild hepatic  steatosis           Report per radiology    Laboratory:  Labs Ordered and Resulted from Time of ED Arrival to Time of ED Departure   INR - Abnormal       Result Value    INR 3.64 (*)    PARTIAL THROMBOPLASTIN TIME - Abnormal    aPTT 56 (*)    BASIC METABOLIC PANEL - Abnormal    Sodium 131 (*)     Potassium 4.1      Chloride 96 (*)     Carbon Dioxide (CO2) 22      Anion Gap 13      Urea Nitrogen 23.5 (*)     Creatinine 0.94      GFR Estimate 62      Calcium 9.3      Glucose 104 (*)    MAGNESIUM - Normal    Magnesium 1.9     CBC WITH PLATELETS AND DIFFERENTIAL    WBC Count 11.0      RBC Count 4.23      Hemoglobin 13.1      Hematocrit 39.1      MCV 92      MCH 31.0      MCHC 33.5      RDW 13.0      Platelet Count 303      % Neutrophils 52      % Lymphocytes 33      % Monocytes 11      % Eosinophils 2      % Basophils 1      % Immature Granulocytes 1      NRBCs per 100 WBC 0      Absolute Neutrophils 5.9      Absolute Lymphocytes 3.6      Absolute Monocytes 1.2      Absolute Eosinophils 0.3      Absolute Basophils 0.1      Absolute Immature Granulocytes 0.1      Absolute NRBCs 0.0        Emergency Department Course & Assessments:    Interventions:  Medications   CT scan flush (61 mLs Intravenous $Given 12/20/23 2232)   iopamidol (ISOVUE-370) solution 500 mL (85 mLs Intravenous $Given 12/20/23 2232)      Assessments:  2110 I obtained history and examined patient. Pelvic exam performed with chaperone.  2249 I rechecked the patient and explained findings.  2305 I rechecked the patient and explained findings.  2335 I rechecked the patient.        Consultations/Discussion of Management or Tests:  ED Course as of 12/20/23 2335   Wed Dec 20, 2023   2148 WBC: 11.0   2148 Hemoglobin: 13.1   2203 PTT(!): 56   2203 INR(!): 3.64   2237 Patient currently at CAT scan   2257 Patient reevaluated sitting comfortably.  Updated on lab results.  CT scan pending.  Discussed with her holding the Xarelto tomorrow.  I reassessed patient's  brief, small mount of bleeding noted.  No active bleeding.     Social Determinants of Health affecting care:  None     Disposition:  The patient was discharged to home.   Impression & Plan    Medical Decision Makin-year-old female presenting today with vaginal bleeding.  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  She was initially scheduled for urology, gynecology tomorrow however was told to go to gynecology oncology who she is scheduled next week.  Tonight she had increased bleeding while at [employer].  She is on Xarelto for history of paroxysmal A-fib.  She states that A-fib is well-controlled with her medications.  On presentation she is well-appearing, hemodynamically stable.  On external vaginal exam there is a mass noted, there is oozing.  The pad she is wearing appears to have moderate mount of blood.  Blood work obtained, hemoglobin is stable and unchanged from 3 days ago.  Her PTT and INR were found be elevated.  She is on Xarelto.  CAT scan was ordered, I deferred ultrasound, transvaginal ultrasound as it may exacerbate bleeding.  CAT scan did show a vaginal wall mass.  Patient was updated on findings.  She had no worsening bleeding.  She went up to the bathroom and reported no increase in bleeding.  I also reexamined her and there was not a significant mount of bleeding noted.  I discussed with her the plan for discharge and close follow-up with gynecology oncology.  Referred her to gynecology oncology with [facility].  Recommended that she hold her Xarelto tomorrow.  Also instructed her to avoid any strenuous activities that may exacerbate the bleeding.  Return precautions discussed.  Patient hemodynamically stable for discharge. All questions and concerns addressed at this time.     Diagnosis:    ICD-10-CM    1. Vaginal bleeding  N93.9 Adult Oncology/Hematology  Referral      2. Vaginal mass  N89.8            Discharge Medications:  New Prescriptions    No medications on file       Scribe Disclosure:  I, Sampson Ringed, am serving as a scribe at 9:16 PM on 12/20/2023 to document services personally performed by Meche Mayfield DO based on my observations and the provider's statements to me.  12/20/2023   Meche Mayfield DO Doan, Tiffani, DO  12/21/23 0057

## 2023-12-21 NOTE — DISCHARGE INSTRUCTIONS
Hold your Xarelto tomorrow     Follow up with gynecology, oncology call [facility] and [place] tomorrow.  Referral was also placed to gynecology oncology with T[facility]    Return to the ER for any worsening or concerning symptoms

## 2023-12-21 NOTE — PROGRESS NOTES
"New Patient Hematology / Oncology Nurse Navigator Note     Referral Date: 12/20/23    Referring provider: Referred by Meche Arita ED, DO   [facility]   [address] [place] [address]   Phone: [phone]   Fax: [phone]       Referring Clinic/Organization: [facility]     Referred to: GynOnc    Requested provider (if applicable): First available - did not specify     Evaluation for : Vaginal bleeding, vaginal mass     Clinical History (per Nurse review of records provided):     ED Visit yesterday at [facility]:   \"Patient states that she had increased bleeding and was seen at [facility] ER on Sunday.  While there she had blood work obtained.  She was referred to urology gynecology and is scheduled for an appointment tomorrow.  She states that when she called the urology, gynecology office they stated that she needed to see gynecology oncology.  She made an appointment with gynecology oncology and is scheduled for an appointment next week.\"  \"  I discussed with her the plan for discharge and close follow-up with gynecology oncology.  Referred her to gynecology oncology with [facility].  Recommended that she hold her Xarelto tomorrow.  Also instructed her to avoid any strenuous activities that may exacerbate the bleeding.  Return precautions discussed.  Patient hemodynamically stable for discharge. All questions and concerns addressed at this time.\" -- BOOKMARKED  CT Abd/Pelvis yesterday showing:  IMPRESSION:   1.  3 x 3.5 x 2 cm soft tissue mass within the left vaginal wall posteriorly, suboptimally evaluated on the current exam, this likely corresponds to patient's stated history of a vaginal mass, consider further evaluation with pelvic ultrasound and/or   pelvic MRI for more complete evaluation.   2.  Cholelithiasis  3.  Moderate amount of stool seen throughout the colon, correlate for constipation   4.  Sigmoid colonic diverticulosis without evidence of diverticulitis "   5.  Mild hepatic steatosis -- BOOKMARKED  12/17/23 ED Visit at [facility] -- BOOKMARKED    Clinical Assessment / Barriers to Care (Per Nurse):  Pt lives in [place], [place]    Records Location: [facility] Everywhere     Records Needed:   Records from [facility] per protocol    Additional testing needed prior to consult:   N/A    Referral updates and Plan:   Per chart review, pt scheduled with GynOnc at [facility] 12/27/23.     OUTGOING CALL to pt:  Introduced my role as nurse navigator with [facility] Hematology/Oncology dept and that we have recd the referral to GynOnc from [facility] ED.  Pt confirms they are aware of the referral and ready to schedule. She confirms she is scheduled with [facility] next week, but is hoping she could be seen sooner at [facility]. She lives in [place].     Writer will review with team and follow-up with patient once plan is in place.   Provided contact information if future questions arise.        Edelmira Stewart, BSN, RN, PHN, OCN  Hematology/Oncology Nurse Navigator  [facility]  [phone]

## 2023-12-21 NOTE — ED TRIAGE NOTES
"      Pt presents to ED with vaginal bleeding.   UTI, seen on Thursday and was treated with keflex. Started having vaginal bleeding after this. Went to  on Saturday and was told she had a \"mass along the wall of the vagina.\" Was then seen at [place] on Sunday and was told the same thing. Bleeding was not too bad at that time and was told she needed a consult for surgery. Has appt on Wednesday with surgeon soon.   Was told if the bleeding gets worse to come to the Emergency Dept. Was in [employer] and started bleeding more heavily. Pt is on xarelto.   Denies dizziness or lightheadedness.   "

## 2023-12-22 NOTE — TELEPHONE ENCOUNTER
RECORDS STATUS - ALL OTHER DIAGNOSIS      RECORDS RECEIVED FROM: [facility], [facility], [facility]   DATE RECEIVED:    NOTES STATUS DETAILS   OFFICE NOTE from referring provider Epic 12/20/23: Dr. Meche Mayfield   OFFICE NOTE from other specialist [facility] 12/27/23: Dr. Ej Abrams   DISCHARGE REPORT from hosp [facility] 12/22-LD  12/15/23: [facility]   DISCHARGE REPORT from the ER [facility] 12/20/23: [facility] ED    12/17/23: [facility] ED   MEDICATION LIST [facility]    LABS     PATHOLOGY REPORTS Report in [facility] PAP:  03/08/13: [id number]   ANYTHING RELATED TO DIAGNOSIS Epic Most recent 12/20/23   IMAGING (NEED IMAGES & REPORT)     CT SCANS PACS 12/20/23: CT [facility]

## 2023-12-22 NOTE — PROGRESS NOTES
Reviewed with Dr. Samaniego who is willing to add patient 12/27 in [place] or also could arrange consult with Dr. Vegas 12/27. Discussed with patient and she is agreeable to [place] clinic as she has other providers in [place] and is familiar with the area. She is agreeable to seeing Dr. Vegas 12/27. She is concerned because she's still bleeding. Seems to be worse after laying down and getting up. Reviewed that her 12/20 Hgb was stable at 13.1 and patient is not having any symptoms currently of anemia. We [facility] could be [facility] if needed) if she's feeling dizzy or lightheaded or if bleeding is uncontrolled. Patient reports understanding and agrees with plan. She has my direct contact number if future questions arise in the meantime.    Edelmira Stewart, BSN, RN, PHN, OCN  Hematology/Oncology Nurse Navigator  [facility]  [phone]

## 2023-12-23 ENCOUNTER — OFFICE VISIT (OUTPATIENT)
Dept: URGENT CARE | Facility: URGENT CARE | Age: 78
End: 2023-12-23
Payer: COMMERCIAL

## 2023-12-23 ENCOUNTER — NURSE TRIAGE (OUTPATIENT)
Dept: NURSING | Facility: CLINIC | Age: 78
End: 2023-12-23
Payer: COMMERCIAL

## 2023-12-23 VITALS
RESPIRATION RATE: 16 BRPM | DIASTOLIC BLOOD PRESSURE: 77 MMHG | BODY MASS INDEX: 26.68 KG/M2 | HEIGHT: 67 IN | SYSTOLIC BLOOD PRESSURE: 153 MMHG | OXYGEN SATURATION: 99 % | WEIGHT: 170 LBS | HEART RATE: 75 BPM | TEMPERATURE: 98.4 F

## 2023-12-23 DIAGNOSIS — R30.0 DYSURIA: ICD-10-CM

## 2023-12-23 DIAGNOSIS — N39.0 URINARY TRACT INFECTION WITHOUT HEMATURIA, SITE UNSPECIFIED: Primary | ICD-10-CM

## 2023-12-23 LAB
ALBUMIN UR-MCNC: NEGATIVE MG/DL
APPEARANCE UR: ABNORMAL
BACTERIA #/AREA URNS HPF: ABNORMAL /HPF
BILIRUB UR QL STRIP: NEGATIVE
COLOR UR AUTO: YELLOW
GLUCOSE UR STRIP-MCNC: NEGATIVE MG/DL
HGB UR QL STRIP: ABNORMAL
KETONES UR STRIP-MCNC: NEGATIVE MG/DL
LEUKOCYTE ESTERASE UR QL STRIP: ABNORMAL
MUCOUS THREADS #/AREA URNS LPF: PRESENT /LPF
NITRATE UR QL: NEGATIVE
PH UR STRIP: 5.5 [PH] (ref 5–7)
RBC #/AREA URNS AUTO: ABNORMAL /HPF
SP GR UR STRIP: >=1.03 (ref 1–1.03)
SQUAMOUS #/AREA URNS AUTO: ABNORMAL /LPF
UROBILINOGEN UR STRIP-ACNC: 0.2 E.U./DL
WBC #/AREA URNS AUTO: ABNORMAL /HPF

## 2023-12-23 PROCEDURE — 99213 OFFICE O/P EST LOW 20 MIN: CPT | Performed by: FAMILY MEDICINE

## 2023-12-23 PROCEDURE — 87086 URINE CULTURE/COLONY COUNT: CPT | Performed by: FAMILY MEDICINE

## 2023-12-23 PROCEDURE — 81001 URINALYSIS AUTO W/SCOPE: CPT

## 2023-12-23 RX ORDER — CEFDINIR 300 MG/1
300 CAPSULE ORAL 2 TIMES DAILY
Qty: 14 CAPSULE | Refills: 0 | Status: SHIPPED | OUTPATIENT
Start: 2023-12-23 | End: 2023-12-30

## 2023-12-23 RX ORDER — CEFDINIR 300 MG/1
300 CAPSULE ORAL 2 TIMES DAILY
Qty: 14 CAPSULE | Refills: 0 | Status: CANCELLED | OUTPATIENT
Start: 2023-12-23 | End: 2023-12-30

## 2023-12-23 NOTE — TELEPHONE ENCOUNTER
Pt calling that she has had UTI and was prescribed Cefdinir and symptoms and went away, symptoms returned at pt put on Keflex for 5 days.  Pt states on 12/16/23 pt seen in Urgent Care as pt started to have some bleeding with urination.  Pt was told she had a growth on her vaginal wall.  Pt has been seen twice in ED for vaginal bleeding last seen 12/20/23 at [facility] ED.  Pt states she continues to have pale pink in he urine, pt told her vaginal growth is oozing.  Pt will be seen by surgeon for vaginal growth on 12/27/23.  Pt intends to go to [facility] [place], [place] now.  Gabriela Salgado RN  FNA Nurse Advisor      Reason for Disposition   Urinating more frequently than usual (i.e., frequency)    Additional Information   Negative: Shock suspected (e.g., cold/pale/clammy skin, too weak to stand, low BP, rapid pulse)   Negative: Sounds like a life-threatening emergency to the triager   Negative: Followed a female genital area injury (e.g., labia, vagina, vulva)   Negative: Followed a male genital area injury (e.g., penis, scrotum)   Negative: Vaginal discharge   Negative: Pus (white, yellow) or bloody discharge from end of penis   Negative: [1] Taking antibiotic for urinary tract infection (UTI) AND [2] female   Negative: [1] Taking antibiotic for urinary tract infection (UTI) AND [2] male   Negative: [1] Pain or burning with passing urine (urination) AND [2] pregnant   Negative: [1] Pain or burning with passing urine (urination) AND [2] postpartum (from 0 to 6 weeks after delivery)   Negative: [1] Pain or burning with passing urine (urination) AND [2] female   Negative: [1] Pain or burning with passing urine (urination) AND [2] male   Negative: Pain or itching in the vulvar area   Negative: Pain in scrotum is main symptom   Negative: Blood in the urine is main symptom   Negative: Symptoms arising from use of a urinary catheter (e.g., [place], [place])   Negative: [1] Unable to urinate (or only a few drops) > 4 hours  AND [2] bladder feels very full (e.g., palpable bladder or strong urge to urinate)   Negative: [1] Decreased urination and [2] drinking very little AND [2] dehydration suspected (e.g., dark urine, no urine > 12 hours, very dry mouth, very lightheaded)   Negative: Patient sounds very sick or weak to the triager   Negative: Fever > 100.4 F (38.0 C)   Negative: Side (flank) or lower back pain present    Protocols used: Urinary Symptoms-A-AH

## 2023-12-24 NOTE — PROGRESS NOTES
SUBJECTIVE:  Chief Complaint   Patient presents with    Urgent Care     Patient states she has UTI sx x dec 3rd. Patient states she was treated for 5 days with Cefdinir. Patient states she went back into the Dr. 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 Patient was told to stop taking her blood thinner until she see's a surgeon due to INR result 1.3     Ebony Pinzon is a 78 year old female who presents with a chief complaint of UTI symptoms, intermittent since 12/3.    Reviewed prior visit.  Urine culture positive for E.coli 12/3.  Treated with Omnicef and did help but took a while.  Seen by primary provider and given new RX Keflex for 5 days and finished on 12/22, this was for another UTI.  Had vaginal bleeding and was noted to have vaginal wall mass.    Here today as feeling more symptoms of UTI, not able to get to bathroom and dysuria symptoms.    Past Medical History:   Diagnosis Date    Irritable bowel syndrome     Paroxysmal A-fib (H) 02/21/2020     Current Outpatient Medications   Medication Sig Dispense Refill    acetaminophen (TYLENOL) 650 MG CR tablet Take 650 mg by mouth every 8 hours as needed for mild pain or fever      Calcium Carb-Cholecalciferol (CALCIUM 500+D3 PO) Take by mouth daily      CRANBERRY PO Take by mouth daily      diltiazem ER (TIAZAC) 180 MG 24 hr ER beaded capsule Take 1 capsule (180 mg) by mouth 2 times daily 180 capsule 3    enalapril (VASOTEC) 20 MG tablet Take 1 tablet (20 mg) by mouth daily 90 tablet 3    estradiol (ESTRACE) 0.1 MG/GM vaginal cream       flecainide (TAMBOCOR) 100 MG tablet Take 1 tablet (100 mg) by mouth 2 times daily 180 tablet 3    LATANOPROST OP       multivitamin (CENTRUM SILVER) tablet Take 1 tablet by mouth daily      omeprazole (PRILOSEC) 20 MG DR capsule Take 20 mg by mouth daily as needed      Probiotic Product  "(PROBIOTIC ADVANCED PO)       rivaroxaban ANTICOAGULANT (XARELTO ANTICOAGULANT) 20 MG TABS tablet Take 1 tablet (20 mg) by mouth daily (with dinner) (Patient not taking: Reported on 12/23/2023) 90 tablet 3     Social History     Tobacco Use    Smoking status: Never    Smokeless tobacco: Never   Substance Use Topics    Alcohol use: Yes     Comment: 1 drink with supper       ROS:  Review of systems negative except as stated above.    EXAM:   BP (!) 153/77   Pulse 75   Temp 98.4  F (36.9  C) (Tympanic)   Resp 16   Ht 1.702 m (5' 7\")   Wt 77.1 kg (170 lb)   LMP  (LMP Unknown)   SpO2 99%   BMI 26.63 kg/m    GENERAL APPEARANCE: healthy, alert and no distress  PSYCH:alert, affect bright    Results for orders placed or performed in visit on 12/23/23   UA Macroscopic with reflex to Microscopic and Culture - Clinic Collect     Status: Abnormal    Specimen: Urine, Midstream   Result Value Ref Range    Color Urine Yellow Colorless, Straw, Light Yellow, Yellow    Appearance Urine Slightly Cloudy (A) Clear    Glucose Urine Negative Negative mg/dL    Bilirubin Urine Negative Negative    Ketones Urine Negative Negative mg/dL    Specific Gravity Urine >=1.030 1.003 - 1.035    Blood Urine Large (A) Negative    pH Urine 5.5 5.0 - 7.0    Protein Albumin Urine Negative Negative mg/dL    Urobilinogen Urine 0.2 0.2, 1.0 E.U./dL    Nitrite Urine Negative Negative    Leukocyte Esterase Urine Trace (A) Negative   UA Microscopic with Reflex to Culture     Status: Abnormal   Result Value Ref Range    Bacteria Urine Few (A) None Seen /HPF    RBC Urine  (A) 0-2 /HPF /HPF    WBC Urine 0-5 0-5 /HPF /HPF    Squamous Epithelials Urine Few (A) None Seen /LPF    Mucus Urine Present (A) None Seen /LPF    Narrative    Urine Culture not indicated       ASSESSMENT/PLAN:  (N39.0) Urinary tract infection without hematuria, site unspecified  (primary encounter diagnosis)  Plan: cefdinir (OMNICEF) 300 MG capsule            (R30.0) " Dysuria  Comment: UTI  Plan: UA Macroscopic with reflex to Microscopic and         Culture - Clinic Collect, UA Microscopic with         Reflex to Culture, Urine Culture Aerobic         Bacterial - lab collect            Empiric treatment for presumptive UTI with RX Omnicef given for treatment in setting of recent malignancy.  Will follow up on urine culture and adjust medication if needed.  Okay for azol to help with dysuria symptoms.  Encourage to continue with plenty of fluids    Follow up with primary provider if no improvement of symptoms in 1 week    Lee Blanco MD  December 23, 2023 7:41 PM

## 2023-12-25 LAB — BACTERIA UR CULT: NORMAL

## 2023-12-26 NOTE — PROGRESS NOTES
Patient called to cancel consult with Dr. Vegas reporting she will be establishing with [facility] instead. Appt was already scheduled with [facility] 12/27. Will cancel referral per pt request.

## 2023-12-27 ENCOUNTER — PRE VISIT (OUTPATIENT)
Dept: ONCOLOGY | Facility: CLINIC | Age: 78
End: 2023-12-27
Payer: COMMERCIAL

## 2024-01-15 ENCOUNTER — LAB (OUTPATIENT)
Dept: LAB | Facility: CLINIC | Age: 79
End: 2024-01-15
Payer: COMMERCIAL

## 2024-01-15 DIAGNOSIS — Z79.01 ON CONTINUOUS ORAL ANTICOAGULATION: ICD-10-CM

## 2024-01-15 LAB
ALBUMIN SERPL BCG-MCNC: 4.2 G/DL (ref 3.5–5.2)
ALP SERPL-CCNC: 132 U/L (ref 40–150)
ALT SERPL W P-5'-P-CCNC: 13 U/L (ref 0–50)
ANION GAP SERPL CALCULATED.3IONS-SCNC: 11 MMOL/L (ref 7–15)
AST SERPL W P-5'-P-CCNC: 24 U/L (ref 0–45)
BILIRUB SERPL-MCNC: 0.3 MG/DL
BUN SERPL-MCNC: 22.6 MG/DL (ref 8–23)
CALCIUM SERPL-MCNC: 9.1 MG/DL (ref 8.8–10.2)
CHLORIDE SERPL-SCNC: 104 MMOL/L (ref 98–107)
CREAT SERPL-MCNC: 1.23 MG/DL (ref 0.51–0.95)
DEPRECATED HCO3 PLAS-SCNC: 25 MMOL/L (ref 22–29)
EGFRCR SERPLBLD CKD-EPI 2021: 45 ML/MIN/1.73M2
GLUCOSE SERPL-MCNC: 80 MG/DL (ref 70–99)
POTASSIUM SERPL-SCNC: 4.4 MMOL/L (ref 3.4–5.3)
PROT SERPL-MCNC: 6.6 G/DL (ref 6.4–8.3)
SODIUM SERPL-SCNC: 140 MMOL/L (ref 135–145)

## 2024-01-15 PROCEDURE — 85610 PROTHROMBIN TIME: CPT

## 2024-01-15 PROCEDURE — 36415 COLL VENOUS BLD VENIPUNCTURE: CPT

## 2024-01-15 PROCEDURE — 80053 COMPREHEN METABOLIC PANEL: CPT

## 2024-01-16 LAB — INR PPP: 1.02 (ref 0.85–1.15)

## 2024-01-22 ENCOUNTER — MYC MEDICAL ADVICE (OUTPATIENT)
Dept: CARDIOLOGY | Facility: CLINIC | Age: 79
End: 2024-01-22
Payer: COMMERCIAL

## 2024-01-22 DIAGNOSIS — I48.0 PAROXYSMAL ATRIAL FIBRILLATION (H): ICD-10-CM

## 2024-01-22 DIAGNOSIS — I10 ESSENTIAL HYPERTENSION: ICD-10-CM

## 2024-01-24 ENCOUNTER — HOSPITAL ENCOUNTER (EMERGENCY)
Facility: CLINIC | Age: 79
Discharge: HOME | End: 2024-01-24
Attending: EMERGENCY MEDICINE | Admitting: EMERGENCY MEDICINE
Payer: COMMERCIAL

## 2024-01-24 ENCOUNTER — APPOINTMENT (OUTPATIENT)
Dept: CT IMAGING | Facility: CLINIC | Age: 79
End: 2024-01-24
Attending: EMERGENCY MEDICINE
Payer: COMMERCIAL

## 2024-01-24 VITALS
RESPIRATION RATE: 20 BRPM | TEMPERATURE: 98.3 F | DIASTOLIC BLOOD PRESSURE: 61 MMHG | HEART RATE: 73 BPM | SYSTOLIC BLOOD PRESSURE: 132 MMHG | OXYGEN SATURATION: 92 %

## 2024-01-24 DIAGNOSIS — J18.9 PNEUMONIA OF RIGHT UPPER LOBE DUE TO INFECTIOUS ORGANISM: ICD-10-CM

## 2024-01-24 LAB
ANION GAP SERPL CALCULATED.3IONS-SCNC: 13 MMOL/L (ref 7–15)
ATRIAL RATE - MUSE: 81 BPM
BASOPHILS # BLD AUTO: 0.1 10E3/UL (ref 0–0.2)
BASOPHILS NFR BLD AUTO: 1 %
BUN SERPL-MCNC: 16.9 MG/DL (ref 8–23)
CALCIUM SERPL-MCNC: 9.2 MG/DL (ref 8.8–10.2)
CHLORIDE SERPL-SCNC: 101 MMOL/L (ref 98–107)
CREAT SERPL-MCNC: 0.78 MG/DL (ref 0.51–0.95)
DEPRECATED HCO3 PLAS-SCNC: 23 MMOL/L (ref 22–29)
DIASTOLIC BLOOD PRESSURE - MUSE: NORMAL MMHG
EGFRCR SERPLBLD CKD-EPI 2021: 78 ML/MIN/1.73M2
EOSINOPHIL # BLD AUTO: 0.1 10E3/UL (ref 0–0.7)
EOSINOPHIL NFR BLD AUTO: 1 %
ERYTHROCYTE [DISTWIDTH] IN BLOOD BY AUTOMATED COUNT: 13.1 % (ref 10–15)
GLUCOSE SERPL-MCNC: 130 MG/DL (ref 70–99)
HCT VFR BLD AUTO: 34 % (ref 35–47)
HGB BLD-MCNC: 11.3 G/DL (ref 11.7–15.7)
IMM GRANULOCYTES # BLD: 0.1 10E3/UL
IMM GRANULOCYTES NFR BLD: 1 %
INTERPRETATION ECG - MUSE: NORMAL
LYMPHOCYTES # BLD AUTO: 2.4 10E3/UL (ref 0.8–5.3)
LYMPHOCYTES NFR BLD AUTO: 18 %
MCH RBC QN AUTO: 31 PG (ref 26.5–33)
MCHC RBC AUTO-ENTMCNC: 33.2 G/DL (ref 31.5–36.5)
MCV RBC AUTO: 93 FL (ref 78–100)
MONOCYTES # BLD AUTO: 1.7 10E3/UL (ref 0–1.3)
MONOCYTES NFR BLD AUTO: 13 %
NEUTROPHILS # BLD AUTO: 8.7 10E3/UL (ref 1.6–8.3)
NEUTROPHILS NFR BLD AUTO: 66 %
NRBC # BLD AUTO: 0 10E3/UL
NRBC BLD AUTO-RTO: 0 /100
NT-PROBNP SERPL-MCNC: 437 PG/ML (ref 0–1800)
P AXIS - MUSE: 67 DEGREES
PLATELET # BLD AUTO: 329 10E3/UL (ref 150–450)
POTASSIUM SERPL-SCNC: 3.9 MMOL/L (ref 3.4–5.3)
PR INTERVAL - MUSE: 190 MS
QRS DURATION - MUSE: 98 MS
QT - MUSE: 390 MS
QTC - MUSE: 453 MS
R AXIS - MUSE: 71 DEGREES
RBC # BLD AUTO: 3.65 10E6/UL (ref 3.8–5.2)
SODIUM SERPL-SCNC: 137 MMOL/L (ref 135–145)
SYSTOLIC BLOOD PRESSURE - MUSE: NORMAL MMHG
T AXIS - MUSE: 57 DEGREES
TROPONIN T SERPL HS-MCNC: 10 NG/L
TROPONIN T SERPL HS-MCNC: 11 NG/L
VENTRICULAR RATE- MUSE: 81 BPM
WBC # BLD AUTO: 13 10E3/UL (ref 4–11)

## 2024-01-24 PROCEDURE — 250N000011 HC RX IP 250 OP 636: Performed by: EMERGENCY MEDICINE

## 2024-01-24 PROCEDURE — 83880 ASSAY OF NATRIURETIC PEPTIDE: CPT | Performed by: EMERGENCY MEDICINE

## 2024-01-24 PROCEDURE — 96365 THER/PROPH/DIAG IV INF INIT: CPT | Mod: 59

## 2024-01-24 PROCEDURE — 93005 ELECTROCARDIOGRAM TRACING: CPT

## 2024-01-24 PROCEDURE — 250N000013 HC RX MED GY IP 250 OP 250 PS 637: Performed by: EMERGENCY MEDICINE

## 2024-01-24 PROCEDURE — 85025 COMPLETE CBC W/AUTO DIFF WBC: CPT | Performed by: EMERGENCY MEDICINE

## 2024-01-24 PROCEDURE — 99285 EMERGENCY DEPT VISIT HI MDM: CPT | Mod: 25

## 2024-01-24 PROCEDURE — 84484 ASSAY OF TROPONIN QUANT: CPT | Mod: 91 | Performed by: EMERGENCY MEDICINE

## 2024-01-24 PROCEDURE — 36415 COLL VENOUS BLD VENIPUNCTURE: CPT | Performed by: EMERGENCY MEDICINE

## 2024-01-24 PROCEDURE — 71275 CT ANGIOGRAPHY CHEST: CPT

## 2024-01-24 PROCEDURE — 80048 BASIC METABOLIC PNL TOTAL CA: CPT | Performed by: EMERGENCY MEDICINE

## 2024-01-24 RX ORDER — OXYCODONE HYDROCHLORIDE 5 MG/1
5 TABLET ORAL EVERY 6 HOURS PRN
Qty: 12 TABLET | Refills: 0 | Status: SHIPPED | OUTPATIENT
Start: 2024-01-24 | End: 2024-01-27

## 2024-01-24 RX ORDER — OXYCODONE HYDROCHLORIDE 5 MG/1
5 TABLET ORAL ONCE
Status: COMPLETED | OUTPATIENT
Start: 2024-01-24 | End: 2024-01-24

## 2024-01-24 RX ORDER — CEFTRIAXONE 1 G/1
1 INJECTION, POWDER, FOR SOLUTION INTRAMUSCULAR; INTRAVENOUS ONCE
Status: COMPLETED | OUTPATIENT
Start: 2024-01-24 | End: 2024-01-24

## 2024-01-24 RX ORDER — IOPAMIDOL 755 MG/ML
500 INJECTION, SOLUTION INTRAVASCULAR ONCE
Status: COMPLETED | OUTPATIENT
Start: 2024-01-24 | End: 2024-01-24

## 2024-01-24 RX ORDER — CEFPODOXIME PROXETIL 200 MG/1
200 TABLET, FILM COATED ORAL 2 TIMES DAILY
Qty: 20 TABLET | Refills: 0 | Status: SHIPPED | OUTPATIENT
Start: 2024-01-24 | End: 2024-02-03

## 2024-01-24 RX ORDER — PREDNISONE 20 MG/1
TABLET ORAL
Qty: 10 TABLET | Refills: 0 | Status: SHIPPED | OUTPATIENT
Start: 2024-01-24 | End: 2024-02-27

## 2024-01-24 RX ADMIN — OXYCODONE HYDROCHLORIDE 5 MG: 5 TABLET ORAL at 04:53

## 2024-01-24 RX ADMIN — IOPAMIDOL 60 ML: 755 INJECTION, SOLUTION INTRAVENOUS at 04:31

## 2024-01-24 RX ADMIN — CEFTRIAXONE 1 G: 1 INJECTION, POWDER, FOR SOLUTION INTRAMUSCULAR; INTRAVENOUS at 06:00

## 2024-01-24 ASSESSMENT — ACTIVITIES OF DAILY LIVING (ADL)
ADLS_ACUITY_SCORE: 35
ADLS_ACUITY_SCORE: 35

## 2024-01-24 NOTE — ED PROVIDER NOTES
History     Chief Complaint:  Chest Pain       HPI   Ebony Pinzon is a 78 year old female with past medical history of paroxysmal A-fib anticoagulated on rivaroxaban but currently being held due to workup for uterine bleeding and uterine mass who presents to the emergency department with sharp right-sided chest pain that began on Sunday and has been intermittent.  She describes it as pleuritic in nature.  Denies any trauma or inciting incident.  Pain is reproducible with palpation.  Not worse with exertion.  She notes that she has had a chronic cough since November and has had x-rays that have been negative for pneumonia.  She denies any fever, congestion or sore throat.   had COVID 3 weeks ago.  Recently restarted her Xarelto 2 days ago after speaking with her Gyn-onc.     Medications:    cefpodoxime (VANTIN) 200 MG tablet  oxyCODONE (ROXICODONE) 5 MG tablet  acetaminophen (TYLENOL) 650 MG CR tablet  Calcium Carb-Cholecalciferol (CALCIUM 500+D3 PO)  CRANBERRY PO  diltiazem ER (TIAZAC) 180 MG 24 hr ER beaded capsule  enalapril (VASOTEC) 20 MG tablet  estradiol (ESTRACE) 0.1 MG/GM vaginal cream  flecainide (TAMBOCOR) 100 MG tablet  LATANOPROST OP  multivitamin (CENTRUM SILVER) tablet  omeprazole (PRILOSEC) 20 MG DR capsule  Probiotic Product (PROBIOTIC ADVANCED PO)  rivaroxaban ANTICOAGULANT (XARELTO ANTICOAGULANT) 20 MG TABS tablet      Past Medical History:    Past Medical History:   Diagnosis Date    Benign essential hypertension     Fibromyalgia     Gastroesophageal reflux disease without esophagitis     Irritable bowel syndrome     Mass of urethra     Paroxysmal A-fib (H) 02/21/2020       Past Surgical History:    Past Surgical History:   Procedure Laterality Date    CARPAL TUNNEL RELEASE RT/LT      TONSILLECTOMY      TOTAL KNEE ARTHROPLASTY          Physical Exam   Patient Vitals for the past 24 hrs:   BP Temp Temp src Pulse Resp SpO2   01/24/24 0615 132/61 -- -- 73 20 92 %   01/24/24 0600 (!)  145/67 -- -- 76 14 93 %   01/24/24 0545 (!) 156/72 -- -- 80 22 93 %   01/24/24 0530 (!) 146/65 -- -- 77 19 95 %   01/24/24 0515 135/61 -- -- 69 17 95 %   01/24/24 0500 (!) 143/62 -- -- 72 (!) 33 94 %   01/24/24 0445 (!) 144/63 -- -- 76 16 96 %   01/24/24 0430 -- -- -- -- 29 95 %   01/24/24 0415 (!) 144/59 -- -- 71 (!) 32 96 %   01/24/24 0400 (!) 150/68 -- -- 76 23 96 %   01/24/24 0345 (!) 152/71 -- -- 80 22 97 %   01/24/24 0339 (!) 148/81 98.3  F (36.8  C) Oral 78 22 96 %        Physical Exam  General: Patient is awake, alert and interactive  Head: The scalp, face, and head appear normal  Eyes: The pupils are equal, round, and reactive to light. Conjunctivae and sclerae are normal  Neck: Normal range of motion.  CV: Regular rate and rhythm. Peripheral pulses including radial pulses are symmetric.   Resp: Lungs are clear without wheezes or rales. No respiratory distress.   GI: Abdomen is soft, no rigidity, guarding, or rebound. No distension. No tenderness to palpation in any quadrant.    MS: Right chest wall tender to palpation. No asymmetric leg swelling, calf or thigh tenderness.    Skin: No rash or lesions noted. Normal capillary refill noted  Neuro: Speech is normal and fluent. Face is symmetric. Moving all extremities.   Psych:  Normal affect.  Appropriate interactions.       Emergency Department Course   ECG  ECG results from 01/24/24   EKG 12-lead, tracing only     Value    Systolic Blood Pressure     Diastolic Blood Pressure     Ventricular Rate 81    Atrial Rate 81    WA Interval 190    QRS Duration 98        QTc 453    P Axis 67    R AXIS 71    T Axis 57    Interpretation ECG      Sinus rhythm  Possible Left atrial enlargement  Nonspecific ST abnormality  Abnormal ECG  When compared with ECG of 28-JAN-2020 17:34,  Premature atrial complexes are no longer Present          Imaging:  CT Chest Pulmonary Embolism w Contrast   Final Result   IMPRESSION:   1.  No pulmonary embolism.      2.  Normal caliber  aorta without dissection.      3.  Right upper lobe pneumonia with dense consolidative alveolar infiltrate. Recommend continued CT follow-up to assess for complete resolution.              Laboratory:  Labs Ordered and Resulted from Time of ED Arrival to Time of ED Departure   BASIC METABOLIC PANEL - Abnormal       Result Value    Sodium 137      Potassium 3.9      Chloride 101      Carbon Dioxide (CO2) 23      Anion Gap 13      Urea Nitrogen 16.9      Creatinine 0.78      GFR Estimate 78      Calcium 9.2      Glucose 130 (*)    CBC WITH PLATELETS AND DIFFERENTIAL - Abnormal    WBC Count 13.0 (*)     RBC Count 3.65 (*)     Hemoglobin 11.3 (*)     Hematocrit 34.0 (*)     MCV 93      MCH 31.0      MCHC 33.2      RDW 13.1      Platelet Count 329      % Neutrophils 66      % Lymphocytes 18      % Monocytes 13      % Eosinophils 1      % Basophils 1      % Immature Granulocytes 1      NRBCs per 100 WBC 0      Absolute Neutrophils 8.7 (*)     Absolute Lymphocytes 2.4      Absolute Monocytes 1.7 (*)     Absolute Eosinophils 0.1      Absolute Basophils 0.1      Absolute Immature Granulocytes 0.1      Absolute NRBCs 0.0     TROPONIN T, HIGH SENSITIVITY - Normal    Troponin T, High Sensitivity 10     NT PROBNP INPATIENT - Normal    N terminal Pro BNP Inpatient 437     TROPONIN T, HIGH SENSITIVITY          Emergency Department Course & Assessments:    Interventions:  Medications   iopamidol (ISOVUE-370) solution 500 mL (60 mLs Intravenous $Given 1/24/24 0431)   sodium chloride (PF) 0.9% PF flush 100 mL (81 mLs Intravenous $Given 1/24/24 0431)   oxyCODONE (ROXICODONE) tablet 5 mg (5 mg Oral $Given 1/24/24 0453)   cefTRIAXone (ROCEPHIN) 1 g vial to attach to  mL bag for ADULTS or NS 50 mL bag for PEDS (1 g Intravenous $New Bag 1/24/24 0600)        Disposition:  The patient was discharged to home.     Impression & Plan      Medical Decision Making:  Patient is a 78-year-old female who presents the emergency department with  significant right-sided chest pain and cough.  Upon initial evaluation patient is mildly hypertensive but otherwise hemodynamically stable.  She is afebrile and oxygenating well on room air.  EKG was obtained which shows no signs of significant ischemia or tachycardia.  Patient has history of paroxysmal atrial fibrillation and has been off of her rivaroxaban for some time given her recent workup for urine and urethral mass.  However she has been taking for the last 2 days.  Concern for possible PE remains.  CT PE study was obtained which shows no signs of pulmonary embolism but does show a dense right-sided upper lobe infiltrate.  I believe this corresponds with her pain.  Patient's been on doxycycline for approximately 24 hours.  I would not call this a treatment failure at this juncture.  However I would expand her antibiotics to include cefpodoxime as well as doxycycline.  No evidence of hypoxia or acute respiratory distress requiring admission at this juncture.  Will give her a short course of oxycodone to go home with for pain control as well as her course of antibiotics.  Will follow-up closely with her primary care team and return to the emergency room with any new or worsening symptoms.    Diagnosis:    ICD-10-CM    1. Pneumonia of right upper lobe due to infectious organism  J18.9            Discharge Medications:  New Prescriptions    CEFPODOXIME (VANTIN) 200 MG TABLET    Take 1 tablet (200 mg) by mouth 2 times daily for 10 days    OXYCODONE (ROXICODONE) 5 MG TABLET    Take 1 tablet (5 mg) by mouth every 6 hours as needed for breakthrough pain          MD Anjali Clark, Jarrett Rayo MD  01/24/24 0614

## 2024-01-24 NOTE — ED TRIAGE NOTES
Pt BIBA from home, waking up with with Right side CP, worsen when breathing, sharp, sharpest pain every experience, no MI hx, recent hospitalization with similar pain.VSS, 2 dose Nitroglycerin given by EMS, some pain improvement with nitroglycerin, SBP improved from 206 -> .  ABC intact

## 2024-01-24 NOTE — ED NOTES
Pt discharge per MD order. Pt is alert and oriented x 3 to her own ability, not appear in acute distress. VS. Stable, Cardiopulmonary status stable. Ambulatory with steady gait. Significant pain improvement at rest upon discharge.  Discharge and follow-up instruction given to patient with families present, pt and families has no learning barrier and demonstrates understanding, all questions and concerns have been answered. Pt stable to be discharge.

## 2024-01-29 ENCOUNTER — NURSE TRIAGE (OUTPATIENT)
Dept: CARDIOLOGY | Facility: CLINIC | Age: 79
End: 2024-01-29

## 2024-01-29 ENCOUNTER — APPOINTMENT (OUTPATIENT)
Dept: GENERAL RADIOLOGY | Facility: CLINIC | Age: 79
End: 2024-01-29
Attending: EMERGENCY MEDICINE
Payer: COMMERCIAL

## 2024-01-29 ENCOUNTER — HOSPITAL ENCOUNTER (EMERGENCY)
Facility: CLINIC | Age: 79
Discharge: HOME | End: 2024-01-29
Attending: EMERGENCY MEDICINE | Admitting: EMERGENCY MEDICINE
Payer: COMMERCIAL

## 2024-01-29 VITALS
BODY MASS INDEX: 26.37 KG/M2 | RESPIRATION RATE: 18 BRPM | TEMPERATURE: 98.1 F | SYSTOLIC BLOOD PRESSURE: 167 MMHG | OXYGEN SATURATION: 96 % | HEART RATE: 70 BPM | HEIGHT: 67 IN | DIASTOLIC BLOOD PRESSURE: 83 MMHG | WEIGHT: 167.99 LBS

## 2024-01-29 DIAGNOSIS — I10 HYPERTENSION, UNSPECIFIED TYPE: ICD-10-CM

## 2024-01-29 DIAGNOSIS — R42 DIZZINESS: ICD-10-CM

## 2024-01-29 LAB
ANION GAP SERPL CALCULATED.3IONS-SCNC: 13 MMOL/L (ref 7–15)
BASOPHILS # BLD AUTO: ABNORMAL 10*3/UL
BASOPHILS # BLD MANUAL: 0 10E3/UL (ref 0–0.2)
BASOPHILS NFR BLD AUTO: ABNORMAL %
BASOPHILS NFR BLD MANUAL: 0 %
BUN SERPL-MCNC: 24.8 MG/DL (ref 8–23)
CALCIUM SERPL-MCNC: 9.6 MG/DL (ref 8.8–10.2)
CHLORIDE SERPL-SCNC: 101 MMOL/L (ref 98–107)
CREAT SERPL-MCNC: 0.8 MG/DL (ref 0.51–0.95)
DEPRECATED HCO3 PLAS-SCNC: 24 MMOL/L (ref 22–29)
EGFRCR SERPLBLD CKD-EPI 2021: 75 ML/MIN/1.73M2
EOSINOPHIL # BLD AUTO: ABNORMAL 10*3/UL
EOSINOPHIL # BLD MANUAL: 0 10E3/UL (ref 0–0.7)
EOSINOPHIL NFR BLD AUTO: ABNORMAL %
EOSINOPHIL NFR BLD MANUAL: 0 %
ERYTHROCYTE [DISTWIDTH] IN BLOOD BY AUTOMATED COUNT: 13.1 % (ref 10–15)
GLUCOSE SERPL-MCNC: 76 MG/DL (ref 70–99)
HCT VFR BLD AUTO: 39 % (ref 35–47)
HGB BLD-MCNC: 12.9 G/DL (ref 11.7–15.7)
HOLD SPECIMEN: NORMAL
IMM GRANULOCYTES # BLD: ABNORMAL 10*3/UL
IMM GRANULOCYTES NFR BLD: ABNORMAL %
LYMPHOCYTES # BLD AUTO: ABNORMAL 10*3/UL
LYMPHOCYTES # BLD MANUAL: 4.8 10E3/UL (ref 0.8–5.3)
LYMPHOCYTES NFR BLD AUTO: ABNORMAL %
LYMPHOCYTES NFR BLD MANUAL: 28 %
MAGNESIUM SERPL-MCNC: 1.7 MG/DL (ref 1.7–2.3)
MCH RBC QN AUTO: 30.6 PG (ref 26.5–33)
MCHC RBC AUTO-ENTMCNC: 33.1 G/DL (ref 31.5–36.5)
MCV RBC AUTO: 93 FL (ref 78–100)
MONOCYTES # BLD AUTO: ABNORMAL 10*3/UL
MONOCYTES # BLD MANUAL: 1.5 10E3/UL (ref 0–1.3)
MONOCYTES NFR BLD AUTO: ABNORMAL %
MONOCYTES NFR BLD MANUAL: 9 %
NEUTROPHILS # BLD AUTO: ABNORMAL 10*3/UL
NEUTROPHILS # BLD MANUAL: 10.8 10E3/UL (ref 1.6–8.3)
NEUTROPHILS NFR BLD AUTO: ABNORMAL %
NEUTROPHILS NFR BLD MANUAL: 63 %
NRBC # BLD AUTO: 0 10E3/UL
NRBC BLD AUTO-RTO: 0 /100
NT-PROBNP SERPL-MCNC: 792 PG/ML (ref 0–1800)
PLAT MORPH BLD: ABNORMAL
PLATELET # BLD AUTO: 440 10E3/UL (ref 150–450)
POTASSIUM SERPL-SCNC: 3.4 MMOL/L (ref 3.4–5.3)
RBC # BLD AUTO: 4.21 10E6/UL (ref 3.8–5.2)
RBC MORPH BLD: ABNORMAL
SODIUM SERPL-SCNC: 138 MMOL/L (ref 135–145)
TROPONIN T SERPL HS-MCNC: 13 NG/L
VARIANT LYMPHS BLD QL SMEAR: PRESENT
WBC # BLD AUTO: 17.2 10E3/UL (ref 4–11)

## 2024-01-29 PROCEDURE — 83735 ASSAY OF MAGNESIUM: CPT | Performed by: EMERGENCY MEDICINE

## 2024-01-29 PROCEDURE — 84484 ASSAY OF TROPONIN QUANT: CPT | Performed by: EMERGENCY MEDICINE

## 2024-01-29 PROCEDURE — 36415 COLL VENOUS BLD VENIPUNCTURE: CPT | Performed by: EMERGENCY MEDICINE

## 2024-01-29 PROCEDURE — 83880 ASSAY OF NATRIURETIC PEPTIDE: CPT | Performed by: EMERGENCY MEDICINE

## 2024-01-29 PROCEDURE — 93005 ELECTROCARDIOGRAM TRACING: CPT

## 2024-01-29 PROCEDURE — 85007 BL SMEAR W/DIFF WBC COUNT: CPT | Performed by: EMERGENCY MEDICINE

## 2024-01-29 PROCEDURE — 80048 BASIC METABOLIC PNL TOTAL CA: CPT | Performed by: EMERGENCY MEDICINE

## 2024-01-29 PROCEDURE — 71046 X-RAY EXAM CHEST 2 VIEWS: CPT

## 2024-01-29 PROCEDURE — 85027 COMPLETE CBC AUTOMATED: CPT | Performed by: EMERGENCY MEDICINE

## 2024-01-29 PROCEDURE — 99285 EMERGENCY DEPT VISIT HI MDM: CPT | Mod: 25

## 2024-01-29 ASSESSMENT — ACTIVITIES OF DAILY LIVING (ADL): ADLS_ACUITY_SCORE: 35

## 2024-01-29 NOTE — TELEPHONE ENCOUNTER
"Received a call from the call center to discuss high blood pressure.  Spoke to Ebony, who says she has had a lot of health issues for the last couple months, and \"a lot has been going on\" recently with having pneumonia and a uterus biopsy.   She says she has had occasional lightheadedness since Thursday, but had not been checking her blood pressure until today. She says she had been feeling lightheaded while doing tasks at home so checked her BP and was 188/89, then a few minutes later was 181 systolic, then a few minutes later 178 systolic. She says she usually runs 140's systolic. She denies having any pain or anxiety currently.   BP on the call using her Omron arm cuff was 195/101 and she says she is currently lightheaded. She denies a headache or visual disturbance.  She says she did take diltiazem, enalapril, and flecainide this morning.  Advised it is recommended to go to ED for an evaluation. She verbalized agreement and understanding and will have her  take her to [facility] ED.  Will send a message to Shasta Sadler's team for an update.    1. BLOOD PRESSURE: \"What is the blood pressure?\" \"Did you take at least two measurements 5 minutes apart?\"  2. ONSET: \"When did you take your blood pressure?\"  Before calling in and during the call  3. HOW: \"How did you take your blood pressure?\" (e.g., automatic home BP monitor, visiting nurse) automatic BP monitor  4. HISTORY: \"Do you have a history of high blood pressure?\" yes  5. MEDICINES: \"Are you taking any medicines for blood pressure?\" \"Have you missed any doses recently?\" Yes,no missed doses  6. OTHER SYMPTOMS: \"Do you have any symptoms?\" (e.g., blurred vision, chest pain, difficulty breathing, headache, weakness) lightheaded  7. PREGNANCY: \"Is there any chance you are pregnant?\" \"When was your last menstrual period?\"  Reason for Disposition    Systolic BP >= 160 OR Diastolic >= 100, and any cardiac (e.g., breathing difficulty, chest pain) or neurologic " symptoms (e.g., new-onset blurred or double vision)    Protocols used: Blood Pressure - High-A-OH

## 2024-01-29 NOTE — ED PROVIDER NOTES
History     Chief Complaint:  Dizziness (/  )     The history is provided by the patient and the spouse.      Ebony Pinzon is a 78 year old female on Xarelto with presenting with dizziness. Patient reports on Wednesday (1/24/24) she wasn't feeling well and just sat in her chair most of the day. She endorses experiencing delirium, which has resolved. She reports feeling lightheaded since Thursday (1/25/24).  She was seen in the ER January 24 diagnosed with a right-sided pneumonia had already been on doxycycline but was discharged home with Vantin.  She states that she also has been on a prednisone pack and has 1 more day left of prednisone 40 mg.  This morning, she took her blood pressure which measured 188/98. She took her medication this morning and it started to go down.  This morning she took her diltiazem, enalapril, flecainide.  She called nurse triage and was referred to the ED. She feels better upon examination. She is still taking doxycycline and Vantin from a recent pneumonia diagnosis. The antibiotics have improved her pain. Patient is eating and drinking normally. She denies loss of consciousness, headache, fever, numbness, tingling, or shortness of breath.     Independent Historian:   Patient's  present at bedside and corroborates the above.     Review of External Notes:   I reviewed the ED note from 1/24/24. She came in with R sided chest pain and diagnosed with pneumonia.      Medications:    Albuterol   Diltiazem   Enalapril   Estradiol   Omeprazole   Prednisone   Xarelto   Lorazepam     Past Medical History:    Hypertension   Fibromyalgia  GERD  IBS  Mass of urethra  Paroxysmal A-fib   Hemorrhoids     Past Surgical History:    Carpal tunnel release   Tonsillectomy   TKA  Artery surgery     Physical Exam   Patient Vitals for the past 24 hrs:   BP Temp Temp src Pulse Resp SpO2 Height Weight   01/29/24 1526 (!) 166/78 -- -- -- -- 96 % -- --   01/29/24 1511 (!) 167/78 -- -- -- -- 97 % --  "--   01/29/24 1456 (!) 163/77 -- -- -- -- 95 % -- --   01/29/24 1238 (!) 221/114 98.1  F (36.7  C) Temporal 83 18 100 % 1.702 m (5' 7\") 76.2 kg (167 lb 15.9 oz)      Physical Exam  Vitals reviewed.   Constitutional:       General: She is not in acute distress.     Appearance: She is not ill-appearing.   HENT:      Head: Normocephalic and atraumatic.   Eyes:      Extraocular Movements: Extraocular movements intact.   Cardiovascular:      Rate and Rhythm: Normal rate and regular rhythm.   Pulmonary:      Effort: Pulmonary effort is normal. No respiratory distress.      Breath sounds: Normal breath sounds. No wheezing.   Abdominal:      Palpations: Abdomen is soft.      Tenderness: There is no abdominal tenderness. There is no guarding.   Musculoskeletal:      Cervical back: Normal range of motion.   Skin:     General: Skin is warm and dry.   Neurological:      Mental Status: She is alert and oriented to person, place, and time.      GCS: GCS eye subscore is 4. GCS verbal subscore is 5. GCS motor subscore is 6.   Psychiatric:         Behavior: Behavior normal.           Emergency Department Course     ECG results from 01/29/24   EKG 12 lead     Value    Systolic Blood Pressure     Diastolic Blood Pressure     Ventricular Rate 73    Atrial Rate 73    TN Interval 206    QRS Duration 100        QTc 440    P Axis 67    R AXIS 69    T Axis 48    Interpretation ECG      Sinus rhythm  Possible Left atrial enlargement  Nonspecific ST abnormality  Abnormal ECG  When compared with ECG of 24-JAN-2024 03:34,  No significant change was found           Imaging:  XR Chest 2 Views   Final Result   IMPRESSION: There are no acute infiltrates. The cardiac silhouette is   not enlarged. Pulmonary vasculature is unremarkable.      PRIMO GAMA MD            SYSTEM ID:  [id number]         Laboratory:  Labs Ordered and Resulted from Time of ED Arrival to Time of ED Departure   BASIC METABOLIC PANEL - Abnormal       Result Value    " Sodium 138      Potassium 3.4      Chloride 101      Carbon Dioxide (CO2) 24      Anion Gap 13      Urea Nitrogen 24.8 (*)     Creatinine 0.80      GFR Estimate 75      Calcium 9.6      Glucose 76     CBC WITH PLATELETS AND DIFFERENTIAL - Abnormal    WBC Count 17.2 (*)     RBC Count 4.21      Hemoglobin 12.9      Hematocrit 39.0      MCV 93      MCH 30.6      MCHC 33.1      RDW 13.1      Platelet Count 440      % Neutrophils        % Lymphocytes        % Monocytes        % Eosinophils        % Basophils        % Immature Granulocytes        NRBCs per 100 WBC 0      Absolute Neutrophils        Absolute Lymphocytes        Absolute Monocytes        Absolute Eosinophils        Absolute Basophils        Absolute Immature Granulocytes        Absolute NRBCs 0.0     DIFFERENTIAL - Abnormal    % Neutrophils 63      % Lymphocytes 28      % Monocytes 9      % Eosinophils 0      % Basophils 0      Absolute Neutrophils 10.8 (*)     Absolute Lymphocytes 4.8      Absolute Monocytes 1.5 (*)     Absolute Eosinophils 0.0      Absolute Basophils 0.0      RBC Morphology Confirmed RBC Indices      Platelet Assessment        Value: Automated Count Confirmed. Platelet morphology is normal.    Reactive Lymphocytes Present (*)    TROPONIN T, HIGH SENSITIVITY - Normal    Troponin T, High Sensitivity 13     NT PROBNP INPATIENT - Normal    N terminal Pro BNP Inpatient 792     MAGNESIUM - Normal    Magnesium 1.7        Procedures       Emergency Department Course & Assessments:    Interventions:  Medications - No data to display       Assessments/Consultations/Discussion of Management or Tests:  ED Course as of 01/29/24 1549   Mon Jan 29, 2024   1339 I obtained the history and examined the patient as noted above.      1359 WBC(!): 17.2  White count noted to be elevated however patient has been on steroids recently.   1424 Troponin T, High Sensitivity: 13   1424 Magnesium: 1.7   1432 Rechecked pt. blood pressure is improved.  Patient  asymptomatic.     Social Determinants of Health affecting care:   None    Disposition:  The patient was discharged to home.     Impression & Plan        Medical Decision Makin-year-old female presenting today with dizziness, lightheadedness and high blood pressure.  She states that she has been treated for right-sided pneumonia continues on antibiotics as well as steroids.  States that she checked her blood pressure today noted that it was high and felt lightheaded and dizzy.  Denies any chest pain, shortness of breath, headaches, numbness, tingling.  She states that she feels much better at this time.  Blood pressure has trended down since arrival in the ER.  I discussed with the patient plan for blood work, repeat chest x-ray.  Her white count found to be elevated at 17 however she is currently on prednisone 40 mg daily.  No YONATHAN noted.  Troponin of 13.  BNP within normal limits.  Chest x-ray with no acute infiltrates identified.  I discussed the results at length with the patient.  On reassessment her blood pressure trended down from 221 systolic to 166.  She reports no symptoms continues asymptomatic.  Discussed with her plan for discharge home and close follow-up with her primary care doctor.  She states that her cardiologist manages her blood pressure medications.  Recommend she follow-up with them.  Also recommended that she only check her blood pressure 1 time every day at the same time every day.  Explained to her that she does not need to check her blood pressure frequently throughout the day.  I discussed with her care instructions and return precautions including any recurrence of symptoms, chest pain, or any worsening or concerning symptoms.  She verbalized understanding agreement.    Diagnosis:    ICD-10-CM    1. Dizziness  R42       2. Hypertension, unspecified type  I10          Discharge Medications:  New Prescriptions    No medications on file      Scribe Disclosure:  Luann SHAH am  serving as a scribe at 1:31 PM on 1/29/2024 to document services personally performed by Meche Mayfield DO based on my observations and the provider's statements to me.  1/29/2024   Meche Mayfield DO Doan, Tiffani, DO  01/29/24 1551

## 2024-01-29 NOTE — DISCHARGE INSTRUCTIONS
Follow-up with your cardiologist regarding further blood pressure management    Return to the ER for any worsening or concerning symptoms.

## 2024-01-30 LAB
ATRIAL RATE - MUSE: 73 BPM
DIASTOLIC BLOOD PRESSURE - MUSE: NORMAL MMHG
INTERPRETATION ECG - MUSE: NORMAL
P AXIS - MUSE: 67 DEGREES
PR INTERVAL - MUSE: 206 MS
QRS DURATION - MUSE: 100 MS
QT - MUSE: 400 MS
QTC - MUSE: 440 MS
R AXIS - MUSE: 69 DEGREES
SYSTOLIC BLOOD PRESSURE - MUSE: NORMAL MMHG
T AXIS - MUSE: 48 DEGREES
VENTRICULAR RATE- MUSE: 73 BPM

## 2024-02-06 NOTE — TELEPHONE ENCOUNTER
02/06/24 Spoke w pt in follow up to ER visit approx 1 week ago. She stated she has been feeling better. Saw PCP in [place] and no adjustments were made. She was instructed to follow up in 1 week again which she has scheduled.  She has been checking Bps at home which are much better 120-130/  She explained that she has been dealing with a persistent cough and many UTIs and vaginal issues and has been seen at [place]. She stated she is finally on the mend which she feels is helping her BP  Advised pt to call back if PCP has any recommendations for her Bps   Pt voiced understanding and agreement with plan.   Krystyna 1140 am

## 2024-02-14 ENCOUNTER — TRANSFERRED RECORDS (OUTPATIENT)
Dept: HEALTH INFORMATION MANAGEMENT | Facility: CLINIC | Age: 79
End: 2024-02-14
Payer: COMMERCIAL

## 2024-02-27 ENCOUNTER — OFFICE VISIT (OUTPATIENT)
Dept: CARDIOLOGY | Facility: CLINIC | Age: 79
End: 2024-02-27
Payer: COMMERCIAL

## 2024-02-27 ENCOUNTER — MYC MEDICAL ADVICE (OUTPATIENT)
Dept: CARDIOLOGY | Facility: CLINIC | Age: 79
End: 2024-02-27

## 2024-02-27 VITALS
WEIGHT: 165 LBS | DIASTOLIC BLOOD PRESSURE: 80 MMHG | BODY MASS INDEX: 25.01 KG/M2 | RESPIRATION RATE: 14 BRPM | OXYGEN SATURATION: 98 % | SYSTOLIC BLOOD PRESSURE: 148 MMHG | HEART RATE: 65 BPM | HEIGHT: 68 IN

## 2024-02-27 DIAGNOSIS — I10 ESSENTIAL HYPERTENSION: ICD-10-CM

## 2024-02-27 DIAGNOSIS — I48.0 PAROXYSMAL ATRIAL FIBRILLATION (H): ICD-10-CM

## 2024-02-27 PROCEDURE — 99214 OFFICE O/P EST MOD 30 MIN: CPT | Performed by: NURSE PRACTITIONER

## 2024-02-27 RX ORDER — AMLODIPINE BESYLATE 2.5 MG/1
2.5 TABLET ORAL DAILY
Qty: 90 TABLET | Refills: 3 | Status: SHIPPED | OUTPATIENT
Start: 2024-02-27 | End: 2024-03-28

## 2024-02-27 NOTE — LETTER
2/27/2024    Poppy Whelan MD  [facility] [address] [facility] [id number]    RE: Ebony Pinzon       Dear Colleague,     I had the pleasure of seeing Ebony Pinzon in the [facility].    Electrophysiology Clinic Progress Note  Ebony Pinzon MRN# [id number]   YOB: 1945 Age: 78 year old     Primary cardiologist: Dr. Oro (general) and previously Dr. Castro ()     Reason for visit: Hypertension follow up    History of presenting illness:    Ebony Pinzon is a pleasant 78 year old patient with past medical history significant for:    Symptomatic paroxysmal atrial fibrillation: maintained on flecainide 100 mg BID since 5/2018  [id number] Score 3 (age ++, HTN, gender) anticoagulated on Xarelto 20 mg daily  Hypertension  IBS  Cognitive impairment  Venous insufficiency with history of right lower extremity vein treatment at [place]    Today the patient is following up for elevated blood pressure.  Ebony is accompanied by her , Gabriel.  She states this morning her blood pressure was significantly elevated prior to medications and she was dizzy and lightheaded.  She took her medications and lay down for approximate 1 to 2 hours with improvement of blood pressure.  She is been under significant stress as she has to undergo a a urethral biopsy at [facility], and she has had family visiting for an extended period of time.     Diagnotic studies:  EKG (9/13/2023): Sinus rhythm at 69 bpm with CT interval of 169, QRS duration of 98 and QTc of 415.  Echocardiogram (5/2022): LVEF of 60 to 65% without wall motion or valvular abnormalities.  LA was mildly dilated with mild MR.          Assessment and Plan:     ASSESSMENT:    Paroxysmal atrial fibrillation  [id number] score of 3 anticoagulated on Xarelto  Hemoglobin 13.1 and creatinine 0.8 from  9/11/2023    Hypertension  Intermittently elevated  Currently on enalapril 20 mg  "daily, diltiazem  mg twice daily    PLAN:     Start amlodipine 2.5 mg daily with goal BP to be consistently less than 130/80.  Can uptitrate to 5 mg daily if not at goal.  Continue diltiazem 120 mg twice daily and enalapril 20 mg daily in the morning  Continue Xarelto indefinitely.  Okay to hold intermittently for upcoming procedure per their protocol.  Return to clinic in 1 year or sooner if needed       Orders this Visit:  No orders of the defined types were placed in this encounter.    Orders Placed This Encounter   Medications    amLODIPine (NORVASC) 2.5 MG tablet     Sig: Take 1 tablet (2.5 mg) by mouth daily     Dispense:  90 tablet     Refill:  3     Medications Discontinued During This Encounter   Medication Reason    acetaminophen (TYLENOL) 650 MG CR tablet     predniSONE (DELTASONE) 20 MG tablet          Today's clinic visit entailed:  Review of the result(s) of each unique test - EKG, echo  Assessment requiring an independent historian(s) - significant other - , Gabriel  Prescription drug management  30 minutes spent by me on the date of the encounter doing chart review, history and exam, documentation and further activities per the note  Provider  Link to [facility] [facility] Help Grid     The level of medical decision making during this visit was of moderate complexity.           Review of Systems:     Review of Systems:  Skin:  Negative     Eyes:  Negative    ENT:  Negative    Respiratory:  Positive for cough  Cardiovascular:    Positive for;lightheadedness;dizziness  Gastroenterology: Positive for heartburn;reflux  Genitourinary:  Negative    Musculoskeletal:  Negative    Neurologic:  Negative migraine headaches;headaches  Psychiatric:  Negative    Heme/Lymph/Imm:  Negative    Endocrine:  Negative              Physical Exam:     Vitals: BP (!) 148/80   Pulse 65   Resp 14   Ht 1.727 m (5' 8\")   Wt 74.8 kg (165 lb)   LMP  (LMP Unknown)   SpO2 98%   BMI 25.09 kg/m    Constitutional: Well nourished and " in no apparent distress.  Eyes: Pupils equal, round. Sclerae anicteric.   HEENT: Normocephalic, atraumatic.   Neck: Supple.   Respiratory: Breathing non-labored. Lungs clear to auscultation bilaterally. No crackles, wheezes, rhonchi, or rales.  Cardiovascular:  Regular rate and rhythm, normal S1 and S2. No murmur, rub, or gallop.  Skin: Warm, dry. No rashes, cyanosis, or xanthelasma.  Extremities: Mild bilateral lower extremity edema  Neurologic: No gross motor deficits. Alert, awake, and oriented to person, place and time.  Psychiatric: Affect appropriate.        CURRENT MEDICATIONS:  Current Outpatient Medications   Medication Sig Dispense Refill    amLODIPine (NORVASC) 2.5 MG tablet Take 1 tablet (2.5 mg) by mouth daily 90 tablet 3    Calcium Carb-Cholecalciferol (CALCIUM 500+D3 PO) Take by mouth daily      CRANBERRY PO Take by mouth daily      diltiazem ER (TIAZAC) 180 MG 24 hr ER beaded capsule Take 1 capsule (180 mg) by mouth 2 times daily 180 capsule 3    enalapril (VASOTEC) 20 MG tablet Take 1 tablet (20 mg) by mouth daily 90 tablet 3    estradiol (ESTRACE) 0.1 MG/GM vaginal cream       flecainide (TAMBOCOR) 100 MG tablet Take 1 tablet (100 mg) by mouth 2 times daily 180 tablet 3    LATANOPROST OP       multivitamin (CENTRUM SILVER) tablet Take 1 tablet by mouth daily      omeprazole (PRILOSEC) 20 MG DR capsule Take 20 mg by mouth daily      Probiotic Product (PROBIOTIC ADVANCED PO)       rivaroxaban ANTICOAGULANT (XARELTO ANTICOAGULANT) 20 MG TABS tablet Take 1 tablet (20 mg) by mouth daily (with dinner) 90 tablet 3       ALLERGIES  Allergies   Allergen Reactions    Amoxicillin-Pot Clavulanate      Other reaction(s): GI intolerance  C-Diff    Codeine Nausea         PAST MEDICAL HISTORY:  Past Medical History:   Diagnosis Date    Benign essential hypertension     Fibromyalgia     Gastroesophageal reflux disease without esophagitis     Irritable bowel syndrome     Mass of urethra     Paroxysmal A-fib (H)  02/21/2020       PAST SURGICAL HISTORY:  Past Surgical History:   Procedure Laterality Date    CARPAL TUNNEL RELEASE RT/LT      TONSILLECTOMY      TOTAL KNEE ARTHROPLASTY         FAMILY HISTORY:  Family History   Problem Relation Age of Onset    Breast Cancer Mother     Atrial fibrillation Father     Other - See Comments Father         enlarged heart; leaky heart valve    Cataracts Sister     No Known Problems Brother     No Known Problems Sister     No Known Problems Brother     Arthritis Son     Arthritis Son        SOCIAL HISTORY:  Social History     Socioeconomic History    Marital status:    Tobacco Use    Smoking status: Never    Smokeless tobacco: Never   Vaping Use    Vaping Use: Never used   Substance and Sexual Activity    Alcohol use: Yes     Comment: 1 drink with supper               Thank you for allowing me to participate in the care of your patient.      Sincerely,     YANIV Mcfarlane [facility]  cc:   No referring provider defined for this encounter.

## 2024-02-27 NOTE — PROGRESS NOTES
Electrophysiology Clinic Progress Note  Ebony Pinzon MRN# [id number]   YOB: 1945 Age: 78 year old     Primary cardiologist: Dr. Oro (general) and previously Dr. Castro ()     Reason for visit: Hypertension follow up    History of presenting illness:    Ebony Pinzon is a pleasant 78 year old patient with past medical history significant for:    Symptomatic paroxysmal atrial fibrillation: maintained on flecainide 100 mg BID since 5/2018  [id number] Score 3 (age ++, HTN, gender) anticoagulated on Xarelto 20 mg daily  Hypertension  IBS  Cognitive impairment  Venous insufficiency with history of right lower extremity vein treatment at [place]    Today the patient is following up for elevated blood pressure.  Ebony is accompanied by her , Gabriel.  She states this morning her blood pressure was significantly elevated prior to medications and she was dizzy and lightheaded.  She took her medications and lay down for approximate 1 to 2 hours with improvement of blood pressure.  She is been under significant stress as she has to undergo a a urethral biopsy at [facility], and she has had family visiting for an extended period of time.     Diagnotic studies:  EKG (9/13/2023): Sinus rhythm at 69 bpm with OK interval of 169, QRS duration of 98 and QTc of 415.  Echocardiogram (5/2022): LVEF of 60 to 65% without wall motion or valvular abnormalities.  LA was mildly dilated with mild MR.          Assessment and Plan:     ASSESSMENT:    Paroxysmal atrial fibrillation  [id number] score of 3 anticoagulated on Xarelto  Hemoglobin 13.1 and creatinine 0.8 from  9/11/2023    Hypertension  Intermittently elevated  Currently on enalapril 20 mg daily, diltiazem  mg twice daily    PLAN:     Start amlodipine 2.5 mg daily with goal BP to be consistently less than 130/80.  Can uptitrate to 5 mg daily if not at goal.  Continue diltiazem 120 mg twice daily and enalapril 20 mg daily in the  "morning  Continue Xarelto indefinitely.  Okay to hold intermittently for upcoming procedure per their protocol.  Return to clinic in 1 year or sooner if needed       Orders this Visit:  No orders of the defined types were placed in this encounter.    Orders Placed This Encounter   Medications    amLODIPine (NORVASC) 2.5 MG tablet     Sig: Take 1 tablet (2.5 mg) by mouth daily     Dispense:  90 tablet     Refill:  3     Medications Discontinued During This Encounter   Medication Reason    acetaminophen (TYLENOL) 650 MG CR tablet     predniSONE (DELTASONE) 20 MG tablet          Today's clinic visit entailed:  Review of the result(s) of each unique test - EKG, echo  Assessment requiring an independent historian(s) - significant other - , Gabriel  Prescription drug management  30 minutes spent by me on the date of the encounter doing chart review, history and exam, documentation and further activities per the note  Provider  Link to [facility] Help Grid     The level of medical decision making during this visit was of moderate complexity.           Review of Systems:     Review of Systems:  Skin:  Negative     Eyes:  Negative    ENT:  Negative    Respiratory:  Positive for cough  Cardiovascular:    Positive for;lightheadedness;dizziness  Gastroenterology: Positive for heartburn;reflux  Genitourinary:  Negative    Musculoskeletal:  Negative    Neurologic:  Negative migraine headaches;headaches  Psychiatric:  Negative    Heme/Lymph/Imm:  Negative    Endocrine:  Negative              Physical Exam:     Vitals: BP (!) 148/80   Pulse 65   Resp 14   Ht 1.727 m (5' 8\")   Wt 74.8 kg (165 lb)   LMP  (LMP Unknown)   SpO2 98%   BMI 25.09 kg/m    Constitutional: Well nourished and in no apparent distress.  Eyes: Pupils equal, round. Sclerae anicteric.   HEENT: Normocephalic, atraumatic.   Neck: Supple.   Respiratory: Breathing non-labored. Lungs clear to auscultation bilaterally. No crackles, wheezes, rhonchi, or " rales.  Cardiovascular:  Regular rate and rhythm, normal S1 and S2. No murmur, rub, or gallop.  Skin: Warm, dry. No rashes, cyanosis, or xanthelasma.  Extremities: Mild bilateral lower extremity edema  Neurologic: No gross motor deficits. Alert, awake, and oriented to person, place and time.  Psychiatric: Affect appropriate.        CURRENT MEDICATIONS:  Current Outpatient Medications   Medication Sig Dispense Refill    amLODIPine (NORVASC) 2.5 MG tablet Take 1 tablet (2.5 mg) by mouth daily 90 tablet 3    Calcium Carb-Cholecalciferol (CALCIUM 500+D3 PO) Take by mouth daily      CRANBERRY PO Take by mouth daily      diltiazem ER (TIAZAC) 180 MG 24 hr ER beaded capsule Take 1 capsule (180 mg) by mouth 2 times daily 180 capsule 3    enalapril (VASOTEC) 20 MG tablet Take 1 tablet (20 mg) by mouth daily 90 tablet 3    estradiol (ESTRACE) 0.1 MG/GM vaginal cream       flecainide (TAMBOCOR) 100 MG tablet Take 1 tablet (100 mg) by mouth 2 times daily 180 tablet 3    LATANOPROST OP       multivitamin (CENTRUM SILVER) tablet Take 1 tablet by mouth daily      omeprazole (PRILOSEC) 20 MG DR capsule Take 20 mg by mouth daily      Probiotic Product (PROBIOTIC ADVANCED PO)       rivaroxaban ANTICOAGULANT (XARELTO ANTICOAGULANT) 20 MG TABS tablet Take 1 tablet (20 mg) by mouth daily (with dinner) 90 tablet 3       ALLERGIES  Allergies   Allergen Reactions    Amoxicillin-Pot Clavulanate      Other reaction(s): GI intolerance  C-Diff    Codeine Nausea         PAST MEDICAL HISTORY:  Past Medical History:   Diagnosis Date    Benign essential hypertension     Fibromyalgia     Gastroesophageal reflux disease without esophagitis     Irritable bowel syndrome     Mass of urethra     Paroxysmal A-fib (H) 02/21/2020       PAST SURGICAL HISTORY:  Past Surgical History:   Procedure Laterality Date    CARPAL TUNNEL RELEASE RT/LT      TONSILLECTOMY      TOTAL KNEE ARTHROPLASTY         FAMILY HISTORY:  Family History   Problem Relation Age of  Onset    Breast Cancer Mother     Atrial fibrillation Father     Other - See Comments Father         enlarged heart; leaky heart valve    Cataracts Sister     No Known Problems Brother     No Known Problems Sister     No Known Problems Brother     Arthritis Son     Arthritis Son        SOCIAL HISTORY:  Social History     Socioeconomic History    Marital status:    Tobacco Use    Smoking status: Never    Smokeless tobacco: Never   Vaping Use    Vaping Use: Never used   Substance and Sexual Activity    Alcohol use: Yes     Comment: 1 drink with supper

## 2024-02-27 NOTE — PATIENT INSTRUCTIONS
Today's Recommendations    Start amlodipine 2.5 mg daily in the evening  Continue same doses of other medications  Monitor BP 1-2 hours after morning medications  Goal BP <130/80    Please send [email or web address] message or call [phone] for further questions or concerns.     It was a pleasure to see you today.     YANIV Cortés, CNP    EP [phone]  Device -[phone]  General scheduling and after hours number [phone]  EP scheduling [phone]

## 2024-03-26 ENCOUNTER — MYC MEDICAL ADVICE (OUTPATIENT)
Dept: CARDIOLOGY | Facility: CLINIC | Age: 79
End: 2024-03-26
Payer: COMMERCIAL

## 2024-03-26 DIAGNOSIS — I10 ESSENTIAL HYPERTENSION: ICD-10-CM

## 2024-03-27 RX ORDER — METHENAMINE HIPPURATE 1000 MG/1
1 TABLET ORAL 2 TIMES DAILY
COMMUNITY

## 2024-03-27 NOTE — TELEPHONE ENCOUNTER
Spoke to pt to apologize and I to was surprised not to hear back. But pt will take her Amlodipine tonight and will discuss with Shasta in the AM to see if Pt can increase the Amlodipine to 10 mg total daily. Pt wondering about 2.5 mg in AM and PM. Updated pt med list to add Methenamine twice daily started by Dr Harrison at [place] for pt UTI's.  Pt aware hat will discuss BP's tomorrow with Shasta and then get back to pt. Maite

## 2024-03-28 RX ORDER — AMLODIPINE BESYLATE 2.5 MG/1
2.5 TABLET ORAL 2 TIMES DAILY
Qty: 180 TABLET | Refills: 1 | Status: SHIPPED | OUTPATIENT
Start: 2024-03-28 | End: 2024-04-01

## 2024-03-28 NOTE — TELEPHONE ENCOUNTER
Discussed with Shasta, who felt that trying Amlodipine 2.5 mg twice a day was fine.  Pt made aware that Shasta was ok with the 2.5 mg of Amlodipine twice a day. Pt will continue to monitor her BP and call with and update in about one week if BP still elevated. Escript sent. Maite

## 2024-04-01 ENCOUNTER — MYC MEDICAL ADVICE (OUTPATIENT)
Dept: CARDIOLOGY | Facility: CLINIC | Age: 79
End: 2024-04-01
Payer: COMMERCIAL

## 2024-04-01 DIAGNOSIS — I10 ESSENTIAL HYPERTENSION: ICD-10-CM

## 2024-04-01 RX ORDER — AMLODIPINE BESYLATE 2.5 MG/1
TABLET ORAL
Qty: 270 TABLET | Refills: 2 | Status: SHIPPED | OUTPATIENT
Start: 2024-04-01 | End: 2024-04-02

## 2024-04-02 RX ORDER — AMLODIPINE BESYLATE 2.5 MG/1
TABLET ORAL
Qty: 270 TABLET | Refills: 2 | Status: SHIPPED | OUTPATIENT
Start: 2024-04-02

## 2024-04-03 ENCOUNTER — TRANSFERRED RECORDS (OUTPATIENT)
Dept: HEALTH INFORMATION MANAGEMENT | Facility: CLINIC | Age: 79
End: 2024-04-03

## 2024-06-26 DIAGNOSIS — I48.0 PAROXYSMAL ATRIAL FIBRILLATION (H): ICD-10-CM

## 2024-06-26 DIAGNOSIS — I10 ESSENTIAL HYPERTENSION: ICD-10-CM

## 2024-06-26 RX ORDER — ENALAPRIL MALEATE 20 MG/1
20 TABLET ORAL DAILY
Qty: 90 TABLET | Refills: 2 | Status: SHIPPED | OUTPATIENT
Start: 2024-06-26

## 2024-07-14 ENCOUNTER — HEALTH MAINTENANCE LETTER (OUTPATIENT)
Age: 79
End: 2024-07-14

## 2024-08-13 ENCOUNTER — MYC MEDICAL ADVICE (OUTPATIENT)
Dept: CARDIOLOGY | Facility: CLINIC | Age: 79
End: 2024-08-13
Payer: COMMERCIAL

## 2024-08-19 ENCOUNTER — MYC MEDICAL ADVICE (OUTPATIENT)
Dept: CARDIOLOGY | Facility: CLINIC | Age: 79
End: 2024-08-19
Payer: COMMERCIAL

## 2024-09-05 DIAGNOSIS — I48.0 PAROXYSMAL ATRIAL FIBRILLATION (H): ICD-10-CM

## 2024-09-13 ENCOUNTER — TRANSCRIBE ORDERS (OUTPATIENT)
Dept: OTHER | Age: 79
End: 2024-09-13

## 2024-09-13 DIAGNOSIS — M54.50 LOW BACK PAIN: Primary | ICD-10-CM

## 2024-10-18 DIAGNOSIS — I10 ESSENTIAL HYPERTENSION: ICD-10-CM

## 2024-10-18 DIAGNOSIS — I48.0 PAROXYSMAL ATRIAL FIBRILLATION (H): ICD-10-CM

## 2024-10-18 RX ORDER — DILTIAZEM HYDROCHLORIDE 180 MG/1
180 CAPSULE, EXTENDED RELEASE ORAL 2 TIMES DAILY
Qty: 180 CAPSULE | Refills: 1 | Status: SHIPPED | OUTPATIENT
Start: 2024-10-18

## 2024-10-18 RX ORDER — FLECAINIDE ACETATE 100 MG/1
100 TABLET ORAL 2 TIMES DAILY
Qty: 180 TABLET | Refills: 1 | Status: SHIPPED | OUTPATIENT
Start: 2024-10-18

## 2024-12-03 ENCOUNTER — TRANSFERRED RECORDS (OUTPATIENT)
Dept: HEALTH INFORMATION MANAGEMENT | Facility: CLINIC | Age: 79
End: 2024-12-03

## 2025-01-13 NOTE — PROGRESS NOTES
Electrophysiology Clinic Progress Note  Ebony Pinzon MRN# [id number]   YOB: 1945 Age: 79 year old     Primary cardiologist: Dr. Oro (general) and previously Dr. Castro ()     Reason for visit: Annual follow up     History of presenting illness:    Ebony Pinzon is a pleasant 79 year old patient with past medical history significant for:    Symptomatic paroxysmal atrial fibrillation: maintained on flecainide 100 mg BID since 5/2018  [id number] Score 3 (age ++, HTN, gender) anticoagulated on Xarelto 20 mg daily  Hypertension  IBS  Cognitive impairment  Venous insufficiency with history of right lower extremity vein treatment at [place]    Today Ebony presents for her annual follow-up.  She is doing overall well and his blood pressure has been well-controlled after multiple adjustments.  She is tolerating Xarelto without concerns.  She herself has had an uneventful year healthwise, but her  Gabriel is struggling with fatigue and blood pressure.    Diagnotic studies:  EKG 1/14/2025: SR with prescriptive block () heart rate of 73 bpm, , QTc 406, QRS duration 104  EKG 9/2023: Sinus rhythm at 69 bpm with UT interval of 169, QRS duration of 98 and QTc of 415.  Echocardiogram 5/2022: LVEF of 60 to 65% without wall motion or valvular abnormalities.  LA was mildly dilated with mild MR.          Assessment and Plan:     ASSESSMENT:    Paroxysmal atrial fibrillation  [id number] score of 3 anticoagulated on Xarelto  Currently on Flecainide 100 mg BID  Rate control with diltiazem  Estimated Creatinine Clearance: 61.7 mL/min (based on SCr of 0.8 mg/dL).    Hypertension  Well controlled  Currently on enalapril 20 mg daily, amlodipine 5 mg in am and 2.5 mg in the pm, and diltiazem  mg twice daily    PLAN:     No change in medical therapy  Return to clinic with EKG in 1 year or sooner if needed       Orders this Visit:  Orders Placed This Encounter   Procedures    Follow-Up with  Cardiology EP    EKG 12-lead complete w/read - Clinics (performed today)     Orders Placed This Encounter   Medications    GEMTESA 75 MG TABS tablet     Sig: Take 75 mg by mouth daily.    amLODIPine (NORVASC) 2.5 MG tablet     Sig: Take 5mg (2 tabs) po in the am and 2.5mg (1 tab) in the evening     Dispense:  270 tablet     Refill:  3    diltiazem ER (TIAZAC) 180 MG 24 hr ER beaded capsule     Sig: Take 1 capsule (180 mg) by mouth 2 times daily.     Dispense:  180 capsule     Refill:  3    enalapril (VASOTEC) 20 MG tablet     Sig: Take 1 tablet (20 mg) by mouth daily.     Dispense:  90 tablet     Refill:  3    flecainide (TAMBOCOR) 100 MG tablet     Sig: Take 1 tablet (100 mg) by mouth 2 times daily.     Dispense:  180 tablet     Refill:  3    rivaroxaban ANTICOAGULANT (XARELTO ANTICOAGULANT) 20 MG TABS tablet     Sig: Take 1 tablet (20 mg) by mouth daily (with dinner).     Dispense:  90 tablet     Refill:  3     Medications Discontinued During This Encounter   Medication Reason    amLODIPine (NORVASC) 2.5 MG tablet Reorder (No AVS)    enalapril (VASOTEC) 20 MG tablet Reorder (No AVS)    rivaroxaban ANTICOAGULANT (XARELTO ANTICOAGULANT) 20 MG TABS tablet Reorder (No AVS)    diltiazem ER (TIAZAC) 180 MG 24 hr ER beaded capsule Reorder (No AVS)    flecainide (TAMBOCOR) 100 MG tablet Reorder (No AVS)       Today's clinic visit entailed:  Review of prior external note(s) from - [facility] information from [facility] reviewed  Review of the result(s) of each unique test - EKG, echo  Prescription drug management  30 minutes spent by me on the date of the encounter doing chart review, history and exam, documentation and further activities per the note  Provider  Link to [facility] Help Grid     The level of medical decision making during this visit was of moderate complexity.    The longitudinal plan of care for the diagnosis(es)/condition(s) as documented were addressed during this visit. Due to the added complexity  "in care, I will continue to support Ebony Pinzon in the subsequent management and with ongoing continuity of care.            Review of Systems:     Review of Systems:  Skin:        Eyes:       ENT:       Respiratory:  Positive for sleep apnea, CPAP  Cardiovascular:  Negative    Gastroenterology:      Genitourinary:       Musculoskeletal:       Neurologic:       Psychiatric:       Heme/Lymph/Imm:       Endocrine:  Negative thyroid disorder, diabetes            Physical Exam:     Vitals: /70   Pulse 73   Ht 1.702 m (5' 7\")   Wt 76.2 kg (168 lb)   LMP  (LMP Unknown)   BMI 26.31 kg/m    Constitutional: Well nourished and in no apparent distress.  Eyes: Pupils equal, round. Sclerae anicteric.   HEENT: Normocephalic, atraumatic.   Neck: Supple.   Respiratory: Breathing non-labored. Lungs clear to auscultation bilaterally. No crackles, wheezes, rhonchi, or rales.  Cardiovascular:  Regular rate and rhythm, normal S1 and S2. No murmur, rub, or gallop.  Skin: Warm, dry. No rashes, cyanosis, or xanthelasma.  Extremities: Mild bilateral lower extremity edema  Neurologic: No gross motor deficits. Alert, awake, and oriented to person, place and time.  Psychiatric: Affect appropriate.        CURRENT MEDICATIONS:  Current Outpatient Medications   Medication Sig Dispense Refill    amLODIPine (NORVASC) 2.5 MG tablet Take 5mg (2 tabs) po in the am and 2.5mg (1 tab) in the evening 270 tablet 3    Calcium Carb-Cholecalciferol (CALCIUM 500+D3 PO) Take by mouth daily      CRANBERRY PO Take by mouth daily      diltiazem ER (TIAZAC) 180 MG 24 hr ER beaded capsule Take 1 capsule (180 mg) by mouth 2 times daily. 180 capsule 3    enalapril (VASOTEC) 20 MG tablet Take 1 tablet (20 mg) by mouth daily. 90 tablet 3    estradiol (ESTRACE) 0.1 MG/GM vaginal cream       flecainide (TAMBOCOR) 100 MG tablet Take 1 tablet (100 mg) by mouth 2 times daily. 180 tablet 3    GEMTESA 75 MG TABS tablet Take 75 mg by mouth daily.      " LATANOPROST OP       methenamine hippurate (HIPREX) 1 g tablet Take 1 g by mouth 2 times daily      multivitamin (CENTRUM SILVER) tablet Take 1 tablet by mouth daily      omeprazole (PRILOSEC) 20 MG DR capsule Take 20 mg by mouth daily      Probiotic Product (PROBIOTIC ADVANCED PO)       rivaroxaban ANTICOAGULANT (XARELTO ANTICOAGULANT) 20 MG TABS tablet Take 1 tablet (20 mg) by mouth daily (with dinner). 90 tablet 3       ALLERGIES  Allergies   Allergen Reactions    Amoxicillin-Pot Clavulanate      Other reaction(s): GI intolerance  C-Diff    Codeine Nausea         PAST MEDICAL HISTORY:  Past Medical History:   Diagnosis Date    Benign essential hypertension     Fibromyalgia     Gastroesophageal reflux disease without esophagitis     Irritable bowel syndrome     Mass of urethra     Paroxysmal A-fib (H) 02/21/2020       PAST SURGICAL HISTORY:  Past Surgical History:   Procedure Laterality Date    CARPAL TUNNEL RELEASE RT/LT      TONSILLECTOMY      TOTAL KNEE ARTHROPLASTY         FAMILY HISTORY:  Family History   Problem Relation Age of Onset    Breast Cancer Mother     Atrial fibrillation Father     Other - See Comments Father         enlarged heart; leaky heart valve    Cataracts Sister     No Known Problems Brother     No Known Problems Sister     No Known Problems Brother     Arthritis Son     Arthritis Son        SOCIAL HISTORY:  Social History     Socioeconomic History    Marital status:      Spouse name: None    Number of children: None    Years of education: None    Highest education level: None   Tobacco Use    Smoking status: Never    Smokeless tobacco: Never   Vaping Use    Vaping status: Never Used   Substance and Sexual Activity    Alcohol use: Yes     Comment: 1 drink with supper     Social Drivers of Health     Financial Resource Strain: Low Risk  (5/25/2023)    [facility]    Overall Financial Resource Strain (CARDIA)     Difficulty of Paying Living Expenses: Not hard  at all   Food Insecurity: No Food Insecurity (7/22/2024)    Received from [facility]    Hunger Vital Sign     Worried About Running Out of Food in the Last Year: Never true     Ran Out of Food in the Last Year: Never true   Transportation Needs: No Transportation Needs (7/22/2024)    Received from [facility]    PRAPARE - Transportation     Lack of Transportation (Medical): No     Lack of Transportation (Non-Medical): No   Physical Activity: Inactive (7/22/2024)    Received from [facility]    Exercise Vital Sign     Days of Exercise per Week: 0 days     Minutes of Exercise per Session: 0 min   Stress: No Stress Concern Present (8/25/2020)    [facility], [facility]    [employer] - Occupational Stress Questionnaire     Feeling of Stress : Not at all   Social Connections: Moderately Integrated (8/25/2020)    [facility] from [facility], [facility]    Social Connection and Isolation Panel [NHANES]     Frequency of Communication with Friends and Family: More than three times a week     Frequency of Social Gatherings with Friends and Family: Three times a week     Attends Muslim Services: More than 4 times per year     Active Member of Clubs or Organizations: No     Attends Club or Organization Meetings: Patient declined     Marital Status:    Interpersonal Safety: Not At Risk (5/25/2023)    [facility], [facility]    Humiliation, Afraid, Rape, and Kick questionnaire     Fear of Current or Ex-Partner: No     Emotionally Abused: No     Physically Abused: No     Sexually Abused: No   Housing Stability: Low Risk  (7/22/2024)    Received from [facility]    Housing Stability     What is your living situation today?: I have a steady place to live

## 2025-01-14 ENCOUNTER — OFFICE VISIT (OUTPATIENT)
Dept: CARDIOLOGY | Facility: CLINIC | Age: 80
End: 2025-01-14
Payer: COMMERCIAL

## 2025-01-14 VITALS
HEART RATE: 73 BPM | SYSTOLIC BLOOD PRESSURE: 125 MMHG | HEIGHT: 67 IN | DIASTOLIC BLOOD PRESSURE: 70 MMHG | WEIGHT: 168 LBS | BODY MASS INDEX: 26.37 KG/M2

## 2025-01-14 DIAGNOSIS — I10 ESSENTIAL HYPERTENSION: ICD-10-CM

## 2025-01-14 DIAGNOSIS — I48.0 PAROXYSMAL ATRIAL FIBRILLATION (H): Primary | ICD-10-CM

## 2025-01-14 PROCEDURE — 99214 OFFICE O/P EST MOD 30 MIN: CPT | Performed by: NURSE PRACTITIONER

## 2025-01-14 PROCEDURE — 93000 ELECTROCARDIOGRAM COMPLETE: CPT | Performed by: NURSE PRACTITIONER

## 2025-01-14 PROCEDURE — G2211 COMPLEX E/M VISIT ADD ON: HCPCS | Performed by: NURSE PRACTITIONER

## 2025-01-14 RX ORDER — VIBEGRON 75 MG/1
75 TABLET, FILM COATED ORAL DAILY
COMMUNITY
Start: 2024-04-12

## 2025-01-14 RX ORDER — DILTIAZEM HYDROCHLORIDE 180 MG/1
180 CAPSULE, EXTENDED RELEASE ORAL 2 TIMES DAILY
Qty: 180 CAPSULE | Refills: 3 | Status: SHIPPED | OUTPATIENT
Start: 2025-01-14

## 2025-01-14 RX ORDER — AMLODIPINE BESYLATE 2.5 MG/1
TABLET ORAL
Qty: 270 TABLET | Refills: 3 | Status: SHIPPED | OUTPATIENT
Start: 2025-01-14

## 2025-01-14 RX ORDER — ENALAPRIL MALEATE 20 MG/1
20 TABLET ORAL DAILY
Qty: 90 TABLET | Refills: 3 | Status: SHIPPED | OUTPATIENT
Start: 2025-01-14

## 2025-01-14 RX ORDER — FLECAINIDE ACETATE 100 MG/1
100 TABLET ORAL 2 TIMES DAILY
Qty: 180 TABLET | Refills: 3 | Status: SHIPPED | OUTPATIENT
Start: 2025-01-14

## 2025-01-14 NOTE — LETTER
1/14/2025    Poppy Whelan MD  [facility] [address] [facility] [address]    RE: Ebony Pinzon       Dear Colleague,     I had the pleasure of seeing Ebony Pinzon in the [facility].    Electrophysiology Clinic Progress Note  Ebony Pinzon MRN# [id number]   YOB: 1945 Age: 79 year old     Primary cardiologist: Dr. Oro (general) and previously Dr. Castro ()     Reason for visit: Annual follow up     History of presenting illness:    Ebony Pinzon is a pleasant 79 year old patient with past medical history significant for:    Symptomatic paroxysmal atrial fibrillation: maintained on flecainide 100 mg BID since 5/2018  [id number] Score 3 (age ++, HTN, gender) anticoagulated on Xarelto 20 mg daily  Hypertension  IBS  Cognitive impairment  Venous insufficiency with history of right lower extremity vein treatment at [place]    Today Ebony presents for her annual follow-up.  She is doing overall well and his blood pressure has been well-controlled after multiple adjustments.  She is tolerating Xarelto without concerns.  She herself has had an uneventful year healthwise, but her  Gabriel is struggling with fatigue and blood pressure.    Diagnotic studies:  EKG 1/14/2025: SR with prescriptive block () heart rate of 73 bpm, , QTc 406, QRS duration 104  EKG 9/2023: Sinus rhythm at 69 bpm with WA interval of 169, QRS duration of 98 and QTc of 415.  Echocardiogram 5/2022: LVEF of 60 to 65% without wall motion or valvular abnormalities.  LA was mildly dilated with mild MR.          Assessment and Plan:     ASSESSMENT:    Paroxysmal atrial fibrillation  [id number] score of 3 anticoagulated on Xarelto  Currently on Flecainide 100 mg BID  Rate control with diltiazem  Estimated Creatinine Clearance: 61.7 mL/min (based on SCr of 0.8 mg/dL).    Hypertension  Well controlled  Currently on enalapril 20 mg daily,  amlodipine 5 mg in am and 2.5 mg in the pm, and diltiazem  mg twice daily    PLAN:     No change in medical therapy  Return to clinic with EKG in 1 year or sooner if needed       Orders this Visit:  Orders Placed This Encounter   Procedures     Follow-Up with Cardiology EP     EKG 12-lead complete w/read - Clinics (performed today)     Orders Placed This Encounter   Medications     GEMTESA 75 MG TABS tablet     Sig: Take 75 mg by mouth daily.     amLODIPine (NORVASC) 2.5 MG tablet     Sig: Take 5mg (2 tabs) po in the am and 2.5mg (1 tab) in the evening     Dispense:  270 tablet     Refill:  3     diltiazem ER (TIAZAC) 180 MG 24 hr ER beaded capsule     Sig: Take 1 capsule (180 mg) by mouth 2 times daily.     Dispense:  180 capsule     Refill:  3     enalapril (VASOTEC) 20 MG tablet     Sig: Take 1 tablet (20 mg) by mouth daily.     Dispense:  90 tablet     Refill:  3     flecainide (TAMBOCOR) 100 MG tablet     Sig: Take 1 tablet (100 mg) by mouth 2 times daily.     Dispense:  180 tablet     Refill:  3     rivaroxaban ANTICOAGULANT (XARELTO ANTICOAGULANT) 20 MG TABS tablet     Sig: Take 1 tablet (20 mg) by mouth daily (with dinner).     Dispense:  90 tablet     Refill:  3     Medications Discontinued During This Encounter   Medication Reason     amLODIPine (NORVASC) 2.5 MG tablet Reorder (No AVS)     enalapril (VASOTEC) 20 MG tablet Reorder (No AVS)     rivaroxaban ANTICOAGULANT (XARELTO ANTICOAGULANT) 20 MG TABS tablet Reorder (No AVS)     diltiazem ER (TIAZAC) 180 MG 24 hr ER beaded capsule Reorder (No AVS)     flecainide (TAMBOCOR) 100 MG tablet Reorder (No AVS)       Today's clinic visit entailed:  Review of prior external note(s) from [facility] reviewed  Review of the result(s) of each unique test - EKG, echo  Prescription drug management  30 minutes spent by me on the date of the encounter doing chart review, history and exam, documentation and further activities  "per the note  Provider  Link to [facility] Help Grid     The level of medical decision making during this visit was of moderate complexity.    The longitudinal plan of care for the diagnosis(es)/condition(s) as documented were addressed during this visit. Due to the added complexity in care, I will continue to support Ebony AQUINO Alberta in the subsequent management and with ongoing continuity of care.            Review of Systems:     Review of Systems:  Skin:        Eyes:       ENT:       Respiratory:  Positive for sleep apnea, CPAP  Cardiovascular:  Negative    Gastroenterology:      Genitourinary:       Musculoskeletal:       Neurologic:       Psychiatric:       Heme/Lymph/Imm:       Endocrine:  Negative thyroid disorder, diabetes            Physical Exam:     Vitals: /70   Pulse 73   Ht 1.702 m (5' 7\")   Wt 76.2 kg (168 lb)   LMP  (LMP Unknown)   BMI 26.31 kg/m    Constitutional: Well nourished and in no apparent distress.  Eyes: Pupils equal, round. Sclerae anicteric.   HEENT: Normocephalic, atraumatic.   Neck: Supple.   Respiratory: Breathing non-labored. Lungs clear to auscultation bilaterally. No crackles, wheezes, rhonchi, or rales.  Cardiovascular:  Regular rate and rhythm, normal S1 and S2. No murmur, rub, or gallop.  Skin: Warm, dry. No rashes, cyanosis, or xanthelasma.  Extremities: Mild bilateral lower extremity edema  Neurologic: No gross motor deficits. Alert, awake, and oriented to person, place and time.  Psychiatric: Affect appropriate.        CURRENT MEDICATIONS:  Current Outpatient Medications   Medication Sig Dispense Refill     amLODIPine (NORVASC) 2.5 MG tablet Take 5mg (2 tabs) po in the am and 2.5mg (1 tab) in the evening 270 tablet 3     Calcium Carb-Cholecalciferol (CALCIUM 500+D3 PO) Take by mouth daily       CRANBERRY PO Take by mouth daily       diltiazem ER (TIAZAC) 180 MG 24 hr ER beaded capsule Take 1 capsule (180 mg) by mouth 2 times daily. 180 capsule 3     enalapril " (VASOTEC) 20 MG tablet Take 1 tablet (20 mg) by mouth daily. 90 tablet 3     estradiol (ESTRACE) 0.1 MG/GM vaginal cream        flecainide (TAMBOCOR) 100 MG tablet Take 1 tablet (100 mg) by mouth 2 times daily. 180 tablet 3     GEMTESA 75 MG TABS tablet Take 75 mg by mouth daily.       LATANOPROST OP        methenamine hippurate (HIPREX) 1 g tablet Take 1 g by mouth 2 times daily       multivitamin (CENTRUM SILVER) tablet Take 1 tablet by mouth daily       omeprazole (PRILOSEC) 20 MG DR capsule Take 20 mg by mouth daily       Probiotic Product (PROBIOTIC ADVANCED PO)        rivaroxaban ANTICOAGULANT (XARELTO ANTICOAGULANT) 20 MG TABS tablet Take 1 tablet (20 mg) by mouth daily (with dinner). 90 tablet 3       ALLERGIES  Allergies   Allergen Reactions     Amoxicillin-Pot Clavulanate      Other reaction(s): GI intolerance  C-Diff     Codeine Nausea         PAST MEDICAL HISTORY:  Past Medical History:   Diagnosis Date     Benign essential hypertension      Fibromyalgia      Gastroesophageal reflux disease without esophagitis      Irritable bowel syndrome      Mass of urethra      Paroxysmal A-fib (H) 02/21/2020       PAST SURGICAL HISTORY:  Past Surgical History:   Procedure Laterality Date     CARPAL TUNNEL RELEASE RT/LT       TONSILLECTOMY       TOTAL KNEE ARTHROPLASTY         FAMILY HISTORY:  Family History   Problem Relation Age of Onset     Breast Cancer Mother      Atrial fibrillation Father      Other - See Comments Father         enlarged heart; leaky heart valve     Cataracts Sister      No Known Problems Brother      No Known Problems Sister      No Known Problems Brother      Arthritis Son      Arthritis Son        SOCIAL HISTORY:  Social History     Socioeconomic History     Marital status:      Spouse name: None     Number of children: None     Years of education: None     Highest education level: None   Tobacco Use     Smoking status: Never     Smokeless tobacco: Never   Vaping Use     Vaping  status: Never Used   Substance and Sexual Activity     Alcohol use: Yes     Comment: 1 drink with supper     Social Drivers of Health     Financial Resource Strain: Low Risk  (5/25/2023)    Received from [facility]    Overall Financial Resource Strain (CARDIA)      Difficulty of Paying Living Expenses: Not hard at all   Food Insecurity: No Food Insecurity (7/22/2024)    Received from [facility]    Hunger Vital Sign      Worried About Running Out of Food in the Last Year: Never true      Ran Out of Food in the Last Year: Never true   Transportation Needs: No Transportation Needs (7/22/2024)    Received from [facility]    PRAPARE - Transportation      Lack of Transportation (Medical): No      Lack of Transportation (Non-Medical): No   Physical Activity: Inactive (7/22/2024)    Received from [facility]    Exercise Vital Sign      Days of Exercise per Week: 0 days      Minutes of Exercise per Session: 0 min   Stress: No Stress Concern Present (8/25/2020)    Received from [facility]    [employer] [place]ational [employer] - Occupational Stress Questionnaire      Feeling of Stress : Not at all   Social Connections: Moderately Integrated (8/25/2020)    Received from [facility]    Social Connection and Isolation Panel [NHANES]      Frequency of Communication with Friends and Family: More than three times a week      Frequency of Social Gatherings with Friends and Family: Three times a week      Attends Anabaptism Services: More than 4 times per year      Active Member of Clubs or Organizations: No      Attends Club or Organization Meetings: Patient declined      Marital Status:    Interpersonal Safety: Not At Risk (5/25/2023)    Received from [facility]    Humiliation, Afraid, Rape, and Kick questionnaire      Fear of Current or Ex-Partner: No      Emotionally Abused: No      Physically Abused: No      Sexually Abused: No   Housing Stability: Low Risk   (7/22/2024)    Received from [facility]    Housing Stability      What is your living situation today?: I have a steady place to live               Thank you for allowing me to participate in the care of your patient.      Sincerely,     YANIV Mcfarlane [facility]  cc:   No referring provider defined for this encounter.

## 2025-01-14 NOTE — PATIENT INSTRUCTIONS
Today's Recommendations    Continue all medications without changes.  Please follow up with cardiology in 1 year with EKG.    Please send Attent[employer] message or call for further questions or concerns.     It was a pleasure to see you today.     Laurie Sadler APRN, CNP    EP [phone]  General scheduling and after hours number [phone]  EP scheduling [phone]

## 2025-02-21 ENCOUNTER — HOSPITAL ENCOUNTER (EMERGENCY)
Facility: CLINIC | Age: 80
Discharge: HOME | End: 2025-02-21
Attending: PHYSICIAN ASSISTANT | Admitting: PHYSICIAN ASSISTANT
Payer: COMMERCIAL

## 2025-02-21 VITALS
TEMPERATURE: 97.7 F | SYSTOLIC BLOOD PRESSURE: 163 MMHG | OXYGEN SATURATION: 98 % | HEIGHT: 67 IN | HEART RATE: 89 BPM | DIASTOLIC BLOOD PRESSURE: 85 MMHG | RESPIRATION RATE: 18 BRPM | BODY MASS INDEX: 26.33 KG/M2 | WEIGHT: 167.77 LBS

## 2025-02-21 DIAGNOSIS — R39.11 URINARY HESITANCY: ICD-10-CM

## 2025-02-21 LAB
ALBUMIN UR-MCNC: NEGATIVE MG/DL
APPEARANCE UR: CLEAR
BILIRUB UR QL STRIP: NEGATIVE
COLOR UR AUTO: NORMAL
GLUCOSE UR STRIP-MCNC: NEGATIVE MG/DL
HGB UR QL STRIP: NEGATIVE
KETONES UR STRIP-MCNC: NEGATIVE MG/DL
LEUKOCYTE ESTERASE UR QL STRIP: NEGATIVE
NITRATE UR QL: NEGATIVE
PH UR STRIP: 6.5 [PH] (ref 5–7)
RBC URINE: 1 /HPF
SP GR UR STRIP: 1.01 (ref 1–1.03)
SQUAMOUS EPITHELIAL: <1 /HPF
UROBILINOGEN UR STRIP-MCNC: NORMAL MG/DL
WBC URINE: <1 /HPF

## 2025-02-21 PROCEDURE — 81003 URINALYSIS AUTO W/O SCOPE: CPT | Performed by: PHYSICIAN ASSISTANT

## 2025-02-21 PROCEDURE — 51798 US URINE CAPACITY MEASURE: CPT

## 2025-02-21 PROCEDURE — 99284 EMERGENCY DEPT VISIT MOD MDM: CPT

## 2025-02-21 ASSESSMENT — COLUMBIA-SUICIDE SEVERITY RATING SCALE - C-SSRS
1. IN THE PAST MONTH, HAVE YOU WISHED YOU WERE DEAD OR WISHED YOU COULD GO TO SLEEP AND NOT WAKE UP?: NO
6. HAVE YOU EVER DONE ANYTHING, STARTED TO DO ANYTHING, OR PREPARED TO DO ANYTHING TO END YOUR LIFE?: NO
2. HAVE YOU ACTUALLY HAD ANY THOUGHTS OF KILLING YOURSELF IN THE PAST MONTH?: NO

## 2025-02-21 ASSESSMENT — ACTIVITIES OF DAILY LIVING (ADL)
ADLS_ACUITY_SCORE: 41
ADLS_ACUITY_SCORE: 41

## 2025-02-22 ENCOUNTER — HOSPITAL ENCOUNTER (EMERGENCY)
Facility: CLINIC | Age: 80
Discharge: HOME | End: 2025-02-22
Attending: EMERGENCY MEDICINE | Admitting: EMERGENCY MEDICINE
Payer: COMMERCIAL

## 2025-02-22 VITALS
OXYGEN SATURATION: 98 % | HEART RATE: 85 BPM | DIASTOLIC BLOOD PRESSURE: 69 MMHG | RESPIRATION RATE: 16 BRPM | SYSTOLIC BLOOD PRESSURE: 158 MMHG | TEMPERATURE: 97.1 F

## 2025-02-22 DIAGNOSIS — R33.9 URINARY RETENTION WITH INCOMPLETE BLADDER EMPTYING: ICD-10-CM

## 2025-02-22 PROCEDURE — 51702 INSERT TEMP BLADDER CATH: CPT

## 2025-02-22 PROCEDURE — 99284 EMERGENCY DEPT VISIT MOD MDM: CPT | Mod: 25

## 2025-02-22 PROCEDURE — 51798 US URINE CAPACITY MEASURE: CPT

## 2025-02-22 ASSESSMENT — ACTIVITIES OF DAILY LIVING (ADL)
ADLS_ACUITY_SCORE: 41
ADLS_ACUITY_SCORE: 45

## 2025-02-22 NOTE — ED TRIAGE NOTES
Patient ambulatory reporting difficulty urinating since this morning. Patient feels the need to go, but is unable to go. When she is able to go, denies pain.

## 2025-02-22 NOTE — DISCHARGE INSTRUCTIONS
At this point, the cause of your symptoms is unclear. You are currently urinating. Your urine is normal appearing. If you developed recurrent difficulty with urination, have a low threshold to return. Follow up with [place] on Monday as scheduled.

## 2025-02-22 NOTE — ED PROVIDER NOTES
Emergency Department Note      History of Present Illness     Chief Complaint   Urinary Urgency     HPI   Ebony Pinzon is a 79 year old female on Xarelto with a history of mass of urethra presenting with urinary symptoms. The patient states that this morning after drink water she felt the urgency to urinate, but was able to produce much urine.  Last night, she did not have to wake up in the night to urinate. Ebony normally has urinary incontinence and has to run to make it to the bathroom. The patient called her doctor at [facility] and was told to come to the ED. Ebony was able to urinate while in the ED. She currently does not have any symptoms. The patient has no back pain, flank pain, abdominal pain or urethral pain. Ebony takes Gemtesa and does not use a pessary. The patient was diagnosed with urethral and vaginal prolapse on 02/18/25.     Independent Historian   None    Review of External Notes   GYN visit on 2/18/25  Impression/report/plan  1. Urethral prolapse   2. Atrial fibrillation on Xarelto   3. Pelvic organ prolapse    I met with the patient and her  to discuss options from urethral prolapse as well as vaginal prolapse standpoint. In terms of urethral prolapse we again discussed options including observation, topical vaginal estrogen use, surgical excision. We discussed what surgical excision would entail with excision of the distal urethra and reconstructing the urethral meatus. Discussed use of a catheter temporarily postoperatively to facilitate healing. We reviewed risks, benefits, alternatives with risks including not limited to infection, bleeding, injury to adjacent organs, urethral prolapse recurrence, de Daren urinary symptoms. Likewise discussed the postoperative recovery and restrictions. For the time being she is going to hold off on surgery.     From a vaginal prolapse standpoint we discussed options including observation, pessary use, surgery. After discussing these she  "is interested in trialing a pessary and a referral order will be provided.     Plan 1. Topical vaginal estrogen   2. Continuing observation of urethral prolapse   3. Referral for pessary fitting for vaginal prolapse.     Past Medical History     Medical History and Problem List   Hypertension   Fibromyalgia  GERD  IBS  Mass of urethra  Paroxysmal A-fib   Hemorrhoids   Vaginal prolapse  Urethral prolapse     Medications   Albuterol   Diltiazem   Enalapril   Estradiol   Omeprazole   Prednisone   Xarelto   Lorazepam   Amlodipine   Flecainide  Gemtesa   Latanoprost  Hiprex      Surgical History   Carpal tunnel release   Tonsillectomy   TKA  Artery surgery     Physical Exam     Patient Vitals for the past 24 hrs:   BP Temp Pulse Resp SpO2 Height Weight   02/21/25 1832 (!) 163/85 97.7  F (36.5  C) 89 18 98 % 1.702 m (5' 7\") 76.1 kg (167 lb 12.3 oz)     Physical Exam  Constitutional: Pleasant. Cooperative.  Eyes: Pupils equally round  HENT: Head is normal in appearance. Oropharynx is normal with moist mucus membranes.  Cardiovascular: Regular rate and rhythm without murmurs.  Respiratory: Normal respiratory effort, lungs clear to auscultation  Musculoskeletal: No asymmetry  Skin: Normal, without rash.  Neurologic: Cranial nerves grossly intact, normal cognition, no apparent deficits.  Psychiatric: Normal affect.  Nursing notes and vital signs reviewed.    Diagnostics     Lab Results   Labs Ordered and Resulted from Time of ED Arrival to Time of ED Departure   ROUTINE UA WITH MICROSCOPIC REFLEX TO CULTURE - Normal       Result Value    Color Urine Straw      Appearance Urine Clear      Glucose Urine Negative      Bilirubin Urine Negative      Ketones Urine Negative      Specific Gravity Urine 1.006      Blood Urine Negative      pH Urine 6.5      Protein Albumin Urine Negative      Urobilinogen Urine Normal      Nitrite Urine Negative      Leukocyte Esterase Urine Negative      RBC Urine 1      WBC Urine <1      Squamous " Epithelials Urine <1         Imaging   No orders to display     Independent Interpretation   None    ED Course      Medications Administered   Medications - No data to display    Procedures   Procedures     Discussion of Management   None    ED Course   ED Course as of 02/21/25 2342   Fri Feb 21, 2025 1934 I obtained the history and examined the patient as noted above.          Additional Documentation  None    Medical Decision Making / Diagnosis     CMS Diagnoses: None    MIPS       None    [facility]   Ebony Pinzon is a 79 year old female with a history of urethral prolapse and vaginal prolapse who presents to the ED for evaluation of urinary urgency and urinary difficulty. Patient with urinary hesitancy/difficulty throughout the day. See HPI as above for additional details. Vitals and physical exam as above. Upon my evaluation, patient is asymptomatic. Patient notes that she was able to urinate normally while providing the sample here. No back or flank pain or blood in the urine to suggest for stone. UA here is normal. Bladder scan here with evidence for 477cc after history obtained. Patient able to urinate again without difficulty with PVR at 237cc. At this time, do not feel that indwelling catheter is indicated. Discussed with patient risks regarding catheter placement, she is in agreement to defer on catheter at this time as she is urinating at her baseline currently. It is certainly possible that her prolapse is leading to some retention of urine at this time. Discussed low threshold to return to the ED with recurrent symptoms. She will follow up with her [facility] doctor in a few days as scheduled. No indication for further work up at this time. Discussed reasons to return. All questions answered. Patient discharged to home in stable condition.    Disposition   The patient was discharged.     Diagnosis     ICD-10-CM    1. Urinary hesitancy  R39.11            Discharge Medications   Discharge Medication List as  of 2/21/2025  8:25 PM            Scribe Disclosure:  I, Sherly Remy, am serving as a scribe at 7:36 PM on 2/21/2025 to document services personally performed by Onel Mckeon PA-C based on my observations and the provider's statements to me.     This record was created at least in part using electronic voice recognition software, so please excuse any typographical errors.       Onel Mckeon PA-C  02/21/25 5357

## 2025-02-23 NOTE — ED TRIAGE NOTES
Patient seen in ER yesterday for urinary retention. Declined cathter at that time. [place] better and discharged. Today, patient reports continued urinary retention as well as dysuria.

## 2025-02-23 NOTE — ED NOTES
Patient discharged, Change to leg bag, quiroga catheter in, Health teaching done, Offered wheelchair patient declines.

## 2025-02-23 NOTE — ED PROVIDER NOTES
Emergency Department Note      History of Present Illness     Chief Complaint   Urinary Retention and Dysuria      HPI   Ebony Pinzon is a 79 year old female     Presenting with lower abdominal discomfort and poor urinary output.  Was seen in the ER yesterday for urinary retention was eventually able to have a successful large-volume voiding did not want a catheter at that time.  Continue trouble urinating today.  No new fever.  No vomiting diarrhea melena hematochezia.  Supposed to see urology on Monday by her report.    Independent Historian       Review of External Notes   See ED course    Past Medical History     Medical History and Problem List   Past Medical History:   Diagnosis Date    Benign essential hypertension     Fibromyalgia     Gastroesophageal reflux disease without esophagitis     Irritable bowel syndrome     Mass of urethra     Paroxysmal A-fib (H) 02/21/2020       Medications   amLODIPine (NORVASC) 2.5 MG tablet  Calcium Carb-Cholecalciferol (CALCIUM 500+D3 PO)  CRANBERRY PO  diltiazem ER (TIAZAC) 180 MG 24 hr ER beaded capsule  enalapril (VASOTEC) 20 MG tablet  estradiol (ESTRACE) 0.1 MG/GM vaginal cream  flecainide (TAMBOCOR) 100 MG tablet  GEMTESA 75 MG TABS tablet  LATANOPROST OP  methenamine hippurate (HIPREX) 1 g tablet  multivitamin (CENTRUM SILVER) tablet  omeprazole (PRILOSEC) 20 MG DR capsule  Probiotic Product (PROBIOTIC ADVANCED PO)  rivaroxaban ANTICOAGULANT (XARELTO ANTICOAGULANT) 20 MG TABS tablet        Surgical History   Past Surgical History:   Procedure Laterality Date    CARPAL TUNNEL RELEASE RT/LT      TONSILLECTOMY      TOTAL KNEE ARTHROPLASTY         Physical Exam     Patient Vitals for the past 24 hrs:   BP Temp Temp src Pulse Resp SpO2   02/22/25 1914 (!) 158/69 -- -- -- -- --   02/22/25 1912 -- 97.1  F (36.2  C) Temporal 85 16 98 %         CV: ppi, regular   Resp: speaking in full sentences without any resp distress  Skin: warm dry well perfused  Neuro: Alert, no  gross motor or sensory deficits,  gait stable      Abdomen: No rigidity, palpable bladder dome in the suprapubic space      Diagnostics     Lab Results   Labs Ordered and Resulted from Time of ED Arrival to Time of ED Departure - No data to display    Imaging   No orders to display       EKG       Independent Interpretation       ED Course      Medications Administered   Medications - No data to display    Procedures   Procedures     Discussion of Management       ED Course   ED Course as of 02/22/25 2115   Sat Feb 22, 2025 2114 I reviewed ED visit from yesterday.   2114 I reviewed gynecology visit from yesterday as well as an office visit from 18 February.       Additional Documentation      Medical Decision Making / Diagnosis     CMS Diagnoses:     [facility]   Ebony Pinzon is a 79 year old female     Presenting with urinary retention.  Seen yesterday wanted to avoid [place] catheter however given the difficulties urinating today she is now amendable to the catheter.  Vitals otherwise unremarkable.  Urinalysis checked yesterday showed no strong evidence of infection and no new fever or dysuria I do not think we need to recheck that urinalysis today.  Catheter placed with over 1 L out.  Improvement in her symptoms.  Discharged home with Reno catheter in place has follow-up on Monday can determine a long term plan from there    Disposition   The patient was discharged.     Diagnosis     ICD-10-CM    1. Urinary retention with incomplete bladder emptying  R33.9            Discharge Medications   New Prescriptions    No medications on file         MD Lee Rao, Pineda Sheridan MD  02/22/25 2117

## 2025-02-26 ENCOUNTER — HOSPITAL ENCOUNTER (EMERGENCY)
Facility: CLINIC | Age: 80
Discharge: HOME | End: 2025-02-26
Attending: EMERGENCY MEDICINE | Admitting: EMERGENCY MEDICINE
Payer: COMMERCIAL

## 2025-02-26 VITALS
RESPIRATION RATE: 20 BRPM | SYSTOLIC BLOOD PRESSURE: 151 MMHG | HEART RATE: 80 BPM | DIASTOLIC BLOOD PRESSURE: 69 MMHG | TEMPERATURE: 97.3 F | OXYGEN SATURATION: 98 %

## 2025-02-26 DIAGNOSIS — R33.9 URINE RETENTION: ICD-10-CM

## 2025-02-26 DIAGNOSIS — N81.10 VAGINAL PROLAPSE: ICD-10-CM

## 2025-02-26 PROCEDURE — 99284 EMERGENCY DEPT VISIT MOD MDM: CPT | Mod: 25

## 2025-02-26 PROCEDURE — 51798 US URINE CAPACITY MEASURE: CPT

## 2025-02-26 PROCEDURE — 51702 INSERT TEMP BLADDER CATH: CPT

## 2025-02-26 RX ORDER — LIDOCAINE HYDROCHLORIDE 20 MG/ML
20 JELLY TOPICAL
Status: DISCONTINUED | OUTPATIENT
Start: 2025-02-26 | End: 2025-02-26 | Stop reason: HOSPADM

## 2025-02-26 ASSESSMENT — ACTIVITIES OF DAILY LIVING (ADL)
ADLS_ACUITY_SCORE: 41
ADLS_ACUITY_SCORE: 41

## 2025-02-26 NOTE — ED NOTES
# 16 quiroga placed by tech. Pt tolerated well. 650 clear straw colored urine out. Switched from bedside drainage bag to leg bag with directions for care. Pt was able to give teach back for care.

## 2025-02-26 NOTE — ED TRIAGE NOTES
Patient has been unable to urinate since 3pm. Cath at clinic at 3pm. Hasn't been able to urinate. Pt has pass maria teresa for prolapse.  Unable to self cath at home. Has been seen multiple times in the ER and at [facility]. Doctor wants her to get a quiroga. On blood thinner.

## 2025-02-26 NOTE — ED PROVIDER NOTES
Emergency Department Note      History of Present Illness     Chief Complaint   Urinary Retention    HPI   Ebony Pinzon is a 79 year old female with a history noted below who presents for an evaluation of urinary retention.  She has a history of a urethral caruncle as well as anterior vaginal wall prolapse that has been causing urinary retention.  She had a catheter placed last Friday which was ultimately removed by her physician at the [facility].  Plan was for use of a pessary to support her vaginal wall and to self cath as needed for urine retention.  She has not been able to self cath despite trying for over 2 hours.  She has been uncomfortable and feeling frustrated.  No fevers.  No vomiting.  At this time they are requesting replacement of the [place] catheter given inability to self catheterize with plans to follow back up with their regular doctor.  Urinalysis from last Friday was normal showing no signs of infection.    Independent Historian   None    Review of External Notes   I reviewed Care Everywhere and updated Epic.   Reviewed office visit note by Dr. Kendrick from 2/24    Past Medical History     Medical History and Problem List   Past Medical History:   Diagnosis Date    Anxiety     Benign essential hypertension     Fibromyalgia     Gastroesophageal reflux disease     Irritable bowel syndrome     Paroxysmal atrial fibrillation     Urethral caruncle     Vaginal wall prolapse        Medications   amLODIPine (NORVASC) 2.5 MG tablet  Calcium Carb-Cholecalciferol (CALCIUM 500+D3 PO)  CRANBERRY PO  diltiazem ER (TIAZAC) 180 MG 24 hr ER beaded capsule  enalapril (VASOTEC) 20 MG tablet  estradiol (ESTRACE) 0.1 MG/GM vaginal cream  flecainide (TAMBOCOR) 100 MG tablet  GEMTESA 75 MG TABS tablet  LATANOPROST OP  methenamine hippurate (HIPREX) 1 g tablet  multivitamin (CENTRUM SILVER) tablet  omeprazole (PRILOSEC) 20 MG DR capsule  Probiotic Product (PROBIOTIC ADVANCED PO)  rivaroxaban ANTICOAGULANT  (XARELTO ANTICOAGULANT) 20 MG TABS tablet      Surgical History   Past Surgical History:   Procedure Laterality Date    CARPAL TUNNEL RELEASE RT/LT      TONSILLECTOMY      TOTAL KNEE ARTHROPLASTY         Physical Exam     Patient Vitals for the past 24 hrs:   BP Temp Temp src Pulse Resp SpO2   02/26/25 0326 151/69 97.3  F (36.3  C) Temporal 80 20 98 %     Physical Exam  Nursing note and vitals reviewed.  Constitutional: Cooperative.  Sitting up comfortably  HENT:   Mouth/Throat: Mucous membranes are normal.   Cardiovascular: Normal rate, regular rhythm and normal heart sounds.  No murmur.  Pulmonary/Chest: Effort normal and breath sounds normal. No respiratory distress. No wheezes.   Abdominal: Soft. Normal appearance. There is no tenderness. There is no rigidity and no guarding.   Neurological: Alert.  Oriented x 3  Skin: Skin is warm and dry.   Psychiatric: Normal mood and affect.     Diagnostics     Lab Results   Labs Ordered and Resulted from Time of ED Arrival to Time of ED Departure - No data to display    Imaging   No orders to display     Independent Interpretation   None    ED Course      Medications Administered   Medications - No data to display    Discussion of Management   None    ED Course   ED Course as of 02/26/25 0451   Wed Feb 26, 2025   0449 The patient had a bladder scan done here, which showed 300 mL.    0450 I obtained the history and evaluated the patient as noted above.      Additional Documentation  None    Medical Decision Making / Diagnosis     [facility]   Ebony Pinzon is a 79 year old female with history of urethral mass as well as vaginal anterior wall prolapse who presents with recurrent urine retention.  This been difficult for her to manage.  She has tried a pessary at home as well as been instructed on self-catheterization techniques.  Unfortunately she is not able to perform this on her own.  Catheter placed here in the emergency department after collaborative conversation with the  patient.  She knows there is some risk of infection with indwelling Reno catheter but given her inability to void or self cath I do not feel like we have any other option at this time.  Will plan to have her follow back up with her primary care team later this week.  Do not feel we need to recheck urine given recent normal urinalysis.  No fever.  Patient comfortable with this discussion    Disposition   The patient was discharged.     Diagnosis     ICD-10-CM    1. Urine retention  R33.9       2. Vaginal wall prolapse  N81.10            Scribe Disclosure:  ISeth, am serving as a scribe at 4:48 AM on 2/26/2025 to document services personally performed by Nelson Torres MD based on my observations and the provider's statements to me.       Nelson Torres MD  02/26/25 0616

## 2025-03-02 ENCOUNTER — HOSPITAL ENCOUNTER (EMERGENCY)
Facility: CLINIC | Age: 80
Discharge: HOME | End: 2025-03-03
Attending: EMERGENCY MEDICINE | Admitting: EMERGENCY MEDICINE
Payer: COMMERCIAL

## 2025-03-02 DIAGNOSIS — T83.511A URINARY TRACT INFECTION ASSOCIATED WITH INDWELLING URETHRAL CATHETER, INITIAL ENCOUNTER: ICD-10-CM

## 2025-03-02 DIAGNOSIS — N39.0 URINARY TRACT INFECTION ASSOCIATED WITH INDWELLING URETHRAL CATHETER, INITIAL ENCOUNTER: ICD-10-CM

## 2025-03-02 PROCEDURE — 99283 EMERGENCY DEPT VISIT LOW MDM: CPT

## 2025-03-02 PROCEDURE — 51702 INSERT TEMP BLADDER CATH: CPT

## 2025-03-03 VITALS
BODY MASS INDEX: 26.83 KG/M2 | DIASTOLIC BLOOD PRESSURE: 74 MMHG | HEART RATE: 86 BPM | TEMPERATURE: 97.2 F | SYSTOLIC BLOOD PRESSURE: 149 MMHG | RESPIRATION RATE: 20 BRPM | OXYGEN SATURATION: 98 % | WEIGHT: 171.3 LBS

## 2025-03-03 LAB
ALBUMIN UR-MCNC: 50 MG/DL
APPEARANCE UR: ABNORMAL
BACTERIA #/AREA URNS HPF: ABNORMAL /HPF
BILIRUB UR QL STRIP: NEGATIVE
COLOR UR AUTO: ABNORMAL
GLUCOSE UR STRIP-MCNC: NEGATIVE MG/DL
HGB UR QL STRIP: ABNORMAL
KETONES UR STRIP-MCNC: NEGATIVE MG/DL
LEUKOCYTE ESTERASE UR QL STRIP: ABNORMAL
NITRATE UR QL: NEGATIVE
PH UR STRIP: 5.5 [PH] (ref 5–7)
RBC URINE: 17 /HPF
SP GR UR STRIP: 1.01 (ref 1–1.03)
SQUAMOUS EPITHELIAL: <1 /HPF
UROBILINOGEN UR STRIP-MCNC: NORMAL MG/DL
WBC CLUMPS #/AREA URNS HPF: PRESENT /HPF
WBC URINE: 112 /HPF

## 2025-03-03 PROCEDURE — 250N000013 HC RX MED GY IP 250 OP 250 PS 637: Performed by: EMERGENCY MEDICINE

## 2025-03-03 PROCEDURE — 81001 URINALYSIS AUTO W/SCOPE: CPT | Performed by: EMERGENCY MEDICINE

## 2025-03-03 PROCEDURE — 87086 URINE CULTURE/COLONY COUNT: CPT | Performed by: EMERGENCY MEDICINE

## 2025-03-03 RX ORDER — CEFUROXIME AXETIL 250 MG/1
500 TABLET ORAL ONCE
Status: COMPLETED | OUTPATIENT
Start: 2025-03-03 | End: 2025-03-03

## 2025-03-03 RX ORDER — CEFUROXIME AXETIL 500 MG/1
500 TABLET ORAL 2 TIMES DAILY
Qty: 14 TABLET | Refills: 0 | Status: SHIPPED | OUTPATIENT
Start: 2025-03-03 | End: 2025-03-10

## 2025-03-03 RX ORDER — HYDROCODONE BITARTRATE AND ACETAMINOPHEN 5; 325 MG/1; MG/1
1 TABLET ORAL ONCE
Status: COMPLETED | OUTPATIENT
Start: 2025-03-03 | End: 2025-03-03

## 2025-03-03 RX ADMIN — HYDROCODONE BITARTRATE AND ACETAMINOPHEN 1 TABLET: 5; 325 TABLET ORAL at 00:36

## 2025-03-03 RX ADMIN — CEFUROXIME AXETIL 500 MG: 250 TABLET, FILM COATED ORAL at 02:36

## 2025-03-03 ASSESSMENT — ACTIVITIES OF DAILY LIVING (ADL)
ADLS_ACUITY_SCORE: 41
ADLS_ACUITY_SCORE: 41

## 2025-03-03 NOTE — ED NOTES
Urinary catheter exchanged for concerns of infection.  Patient meatus very swollen and reddened.  Catheter replaced successfully with urine return to bag.  Patient reports moderate to severe pain with exchange. MD Alba updated for pain control, see orders.

## 2025-03-03 NOTE — ED TRIAGE NOTES
Pt reports she had urinary retention and had a catheter placed on the 2/26. Pt noticed blood in her urine PTA. Pt is taking xeralto. Pt concerned for UTI

## 2025-03-03 NOTE — ED PROVIDER NOTES
"  Emergency Department Note      History of Present Illness     Chief Complaint   Hematuria    HPI   Ebony Pinzon is a 79 year old female on Xarelto with a history as noted below who presents to the emergency department for hematuria. The patient states that she has been experiencing ongoing urinary retention and was recently diagnosed with a vaginal wall prolapse, noting that she had a catheter placed on 02/26/25. She notes that today, she observed \"pink\" hematuria in the catheter bag and adds that she has also been experiencing left lower quadrant abdominal cramping. She adds that she has not recently taken any antibiotics. She is currently discussing surgery regarding her hx of vaginal wall prolapse and bladder prolapse. She is on Xarelto for her hx of AFIB. No fevers or chills. No back pain or flank pain. No nausea, vomiting, diarrhea.    Independent Historian   None    Review of External Notes   Reviewed patient's 3 ED notes from February 2025.    Reviewed OB/GYN note from 02/28/25.    Past Medical History     Medical History and Problem List   Anxiety  Hypertension   Fibromyalgia  GERD  IBS  PAF  Urethral caruncle  Vaginal wall prolapse    Medications   amLODIPine (NORVASC) 2.5 MG tablet  diltiazem ER (TIAZAC) 180 MG 24 hr ER beaded capsule  enalapril (VASOTEC) 20 MG tablet  flecainide (TAMBOCOR) 100 MG tablet  GEMTESA 75 MG TABS tablet  methenamine hippurate (HIPREX) 1 g tablet  omeprazole (PRILOSEC) 20 MG DR capsule  rivaroxaban ANTICOAGULANT (XARELTO ANTICOAGULANT) 20 MG TABS tablet    Surgical History   Artery surgery  Carpal tunnel release bilateral  Tonsillectomy  TKA    Physical Exam     Patient Vitals for the past 24 hrs:   BP Temp Temp src Pulse Resp SpO2 Weight   03/02/25 2358 (!) 166/70 97.2  F (36.2  C) Temporal 91 20 97 % 77.7 kg (171 lb 4.8 oz)     Physical Exam  General: Patient is awake, alert and interactive when I enter the room. Appears uncomfortable.   Head: The scalp, face, and head " appear normal  Eyes: Conjunctivae and sclerae are normal  Neck: Normal range of motion.   CV: Regular rate.   Resp:  No respiratory distress.   GI: suprapubic tenderness but abdomen is soft, no rigidity. No evidence of pulsatile mass. No fluid waves or evidence of ascites. No distension. No hernias or bruising are noted in detailed exam. No CVA tenderness.    MS: Normal tone.   Skin: Normal capillary refill noted  Neuro: Speech is normal and fluent. Face is symmetric. Moving all extremities.   Psych:  Normal affect.  Appropriate interactions.    Diagnostics     Lab Results   Labs Ordered and Resulted from Time of ED Arrival to Time of ED Departure   ROUTINE UA WITH MICROSCOPIC REFLEX TO CULTURE - Abnormal       Result Value    Color Urine Straw      Appearance Urine Slightly Cloudy (*)     Glucose Urine Negative      Bilirubin Urine Negative      Ketones Urine Negative      Specific Gravity Urine 1.011      Blood Urine Large (*)     pH Urine 5.5      Protein Albumin Urine 50 (*)     Urobilinogen Urine Normal      Nitrite Urine Negative      Leukocyte Esterase Urine Large (*)     Bacteria Urine Few (*)     WBC Clumps Urine Present (*)     RBC Urine 17 (*)     WBC Urine 112 (*)     Squamous Epithelials Urine <1     URINE CULTURE     Imaging   None    Independent Interpretation   None    ED Course      Medications Administered   Medications   HYDROcodone-acetaminophen (NORCO) 5-325 MG per tablet 1 tablet (1 tablet Oral $Given 3/3/25 0036)   cefuroxime (CEFTIN) tablet 500 mg (500 mg Oral $Given 3/3/25 0236)     Discussion of Management   None    ED Course   ED Course as of 03/03/25 0255   Mon Mar 03, 2025   0009 I obtained history and examined the patient as noted above.      0208 I rechecked the patient. I discussed findings and discharge with the patient. All questions answered.        Additional Documentation  None    Medical Decision Making / Diagnosis     CMS Diagnoses: None    [facility]   None    [facility]   Ebony AQUINO  Alberta is a 79 year old female who presents emergency department with hematuria and suprapubic pain and cramping.  Patient has a [place] catheter in place due to issue with urinary retention due to vaginal wall prolapse and bladder prolapse.  [place] catheter was exchanged and urine is concerning for infection.  Will start her on antibiotics.  First dose of antibiotics was delivered in the emergency department.  Recommended close follow-up with urology and OB/GYN.  No evidence of sepsis or systemic infection at this time.    Disposition   The patient was discharged.     Diagnosis     ICD-10-CM    1. Urinary tract infection associated with indwelling urethral catheter, initial encounter  T83.511A     N39.0          Discharge Medications   Current Discharge Medication List        START taking these medications    Details   cefuroxime (CEFTIN) 500 MG tablet Take 1 tablet (500 mg) by mouth 2 times daily for 7 days.  Qty: 14 tablet, Refills: 0           Scribe Disclosure:  Gadiel SHAH, am serving as a scribe at 12:00 AM on 3/3/2025 to document services personally performed by Jarrett Alba MD based on my observations and the provider's statements to me.        Jarrett Alba MD  03/03/25 3987

## 2025-03-04 LAB — BACTERIA UR CULT: NORMAL

## 2025-03-05 NOTE — LETTER
9/13/2023    [facility]  [address] [facility] [address]    RE: Ebony Pinzon       Dear Colleague,     I had the pleasure of seeing Ebony Pinzon in the [facility].    Electrophysiology Clinic Progress Note  Ebony Pinzon MRN# [id number]   YOB: 1945 Age: 78 year old     Primary cardiologist: Dr. Oro (general) and previously Dr. Castro ()     Reason for visit: Annual follow up     History of presenting illness:    Ebony Pinzon is a pleasant 78 year old patient with past medical history significant for:    Symptomatic paroxysmal atrial fibrillation: maintained on flecainide 100 mg BID since 5/2018  [id number] Score 3 (age ++, HTN, gender) anticoagulated on Xarelto 20 mg daily  Hypertension  IBS  Venous insufficiency with history of right lower extremity vein treatment at [facility]    Today she returns for an annual office visit.  She is doing overall well without any cardiopulmonary's concerns.  We discussed the finding of her EKG today as well as her previous echocardiogram.  Her biggest concern is around bilateral lower extremity edema that is progressive throughout the day.  She reports that she had a history of right lower extremity vein treatment at [facility] several years ago and is currently compliant with compression stockings.    Diagnotic studies:  EKG (9/13/2023): Sinus rhythm at 69 bpm with WA interval of 169, QRS duration of 98 and QTc of 415.  Echocardiogram (5/2022): LVEF of 60 to 65% without wall motion or valvular abnormalities.  LA was mildly dilated with mild MR.          Assessment and Plan:     ASSESSMENT:    Paroxysmal atrial fibrillation  [id number] score of 3 anticoagulated on Xarelto  Hemoglobin 13.1 and creatinine 0.8 from  9/11/2023    Hypertension  Well-controlled  Currently on enalapril 20 mg daily, diltiazem  mg twice daily    PLAN:     Transition diltiazem ER to 360 mg daily from 100 mg  twice daily as it is the extended release preparation  Move enalapril to the p.m. to prevent hypotension  Continue Xarelto indefinitely  Return to clinic in 1 year or sooner if needed       Orders this Visit:  Orders Placed This Encounter   Procedures    Follow-Up with Cardiology MCKENZIE    EKG 12-lead complete w/read - Clinics (performed today)     Orders Placed This Encounter   Medications    flecainide (TAMBOCOR) 100 MG tablet     Sig: Take 1 tablet (100 mg) by mouth 2 times daily     Dispense:  180 tablet     Refill:  3    enalapril (VASOTEC) 20 MG tablet     Sig: Take 1 tablet (20 mg) by mouth daily     Dispense:  90 tablet     Refill:  3    diltiazem ER (TIAZAC) 180 MG 24 hr ER beaded capsule     Sig: Take 2 capsules (360 mg) by mouth daily     Dispense:  180 capsule     Refill:  3     Medications Discontinued During This Encounter   Medication Reason    diltiazem ER (TIAZAC) 180 MG 24 hr ER beaded capsule     flecainide (TAMBOCOR) 100 MG tablet Reorder (No AVS)    enalapril (VASOTEC) 20 MG tablet Reorder (No AVS)       Today's clinic visit entailed:  Review of the result(s) of each unique test - EKG, echo  Prescription drug management  30 minutes spent by me on the date of the encounter doing chart review, history and exam, documentation and further activities per the note  Provider  Link to [facility] Help Grid     The level of medical decision making during this visit was of moderate complexity.           Review of Systems:     Review of Systems:  Skin:        Eyes:       ENT:       Respiratory:  Negative    Cardiovascular:    lightheadedness;Positive for  Gastroenterology:      Genitourinary:       Musculoskeletal:       Neurologic:       Psychiatric:       Heme/Lymph/Imm:       Endocrine:  Negative thyroid disorder;diabetes            Physical Exam:     Vitals: /68   Wt 77.1 kg (170 lb)   LMP  (LMP Unknown)   BMI 26.63 kg/m    Constitutional: Well nourished and in no apparent distress.  Eyes: Pupils equal,  round. Sclerae anicteric.   HEENT: Normocephalic, atraumatic.   Neck: Supple.   Respiratory: Breathing non-labored. Lungs clear to auscultation bilaterally. No crackles, wheezes, rhonchi, or rales.  Cardiovascular:  Regular rate and rhythm, normal S1 and S2. No murmur, rub, or gallop.  Skin: Warm, dry. No rashes, cyanosis, or xanthelasma.  Extremities: Mild bilateral lower extremity edema  Neurologic: No gross motor deficits. Alert, awake, and oriented to person, place and time.  Psychiatric: Affect appropriate.        CURRENT MEDICATIONS:  Current Outpatient Medications   Medication Sig Dispense Refill    acetaminophen (TYLENOL) 650 MG CR tablet Take 650 mg by mouth every 8 hours as needed for mild pain or fever      Calcium Carb-Cholecalciferol (CALCIUM 500+D3 PO) Take by mouth daily      CRANBERRY PO Take by mouth daily      diltiazem ER (TIAZAC) 180 MG 24 hr ER beaded capsule Take 2 capsules (360 mg) by mouth daily 180 capsule 3    enalapril (VASOTEC) 20 MG tablet Take 1 tablet (20 mg) by mouth daily 90 tablet 3    estradiol (ESTRACE) 0.1 MG/GM vaginal cream       flecainide (TAMBOCOR) 100 MG tablet Take 1 tablet (100 mg) by mouth 2 times daily 180 tablet 3    LATANOPROST OP       multivitamin (CENTRUM SILVER) tablet Take 1 tablet by mouth daily      omeprazole (PRILOSEC) 20 MG DR capsule Take 20 mg by mouth daily as needed      Probiotic Product (PROBIOTIC ADVANCED PO)       rivaroxaban ANTICOAGULANT (XARELTO ANTICOAGULANT) 20 MG TABS tablet Take 1 tablet (20 mg) by mouth daily (with dinner) 90 tablet 3       ALLERGIES  Allergies   Allergen Reactions    Amoxicillin-Pot Clavulanate      Other reaction(s): GI intolerance  C-Diff    Codeine Nausea         PAST MEDICAL HISTORY:  Past Medical History:   Diagnosis Date    Irritable bowel syndrome     Paroxysmal A-fib (H) 02/21/2020       PAST SURGICAL HISTORY:  History reviewed. No pertinent surgical history.    FAMILY HISTORY:  Family History   Problem Relation Age  of Onset    Breast Cancer Mother     Atrial fibrillation Father     Other - See Comments Father         enlarged heart; leaky heart valve    Cataracts Sister     No Known Problems Brother     No Known Problems Sister     No Known Problems Brother     Arthritis Son     Arthritis Son        SOCIAL HISTORY:  Social History     Socioeconomic History    Marital status:      Spouse name: None    Number of children: None    Years of education: None    Highest education level: None   Tobacco Use    Smoking status: Never    Smokeless tobacco: Never   Substance and Sexual Activity    Alcohol use: Yes     Comment: 1 drink with supper               Thank you for allowing me to participate in the care of your patient.      Sincerely,     YANIV Mcfarlane [facility]  cc:   No referring provider defined for this encounter.       [Follow-Up: _____] : a [unfilled] follow-up visit [Family Member] : family member

## 2025-03-14 ENCOUNTER — APPOINTMENT (OUTPATIENT)
Dept: CT IMAGING | Facility: CLINIC | Age: 80
DRG: 394 | End: 2025-03-14
Attending: EMERGENCY MEDICINE
Payer: COMMERCIAL

## 2025-03-14 ENCOUNTER — HOSPITAL ENCOUNTER (INPATIENT)
Facility: CLINIC | Age: 80
LOS: 1 days | Discharge: HOME | DRG: 394 | End: 2025-03-16
Attending: EMERGENCY MEDICINE | Admitting: STUDENT IN AN ORGANIZED HEALTH CARE EDUCATION/TRAINING PROGRAM
Payer: COMMERCIAL

## 2025-03-14 ENCOUNTER — APPOINTMENT (OUTPATIENT)
Dept: ULTRASOUND IMAGING | Facility: CLINIC | Age: 80
DRG: 394 | End: 2025-03-14
Attending: EMERGENCY MEDICINE
Payer: COMMERCIAL

## 2025-03-14 DIAGNOSIS — R53.1 GENERALIZED WEAKNESS: ICD-10-CM

## 2025-03-14 DIAGNOSIS — R10.84 GENERALIZED ABDOMINAL PAIN: ICD-10-CM

## 2025-03-14 DIAGNOSIS — R19.7 DIARRHEA, UNSPECIFIED TYPE: ICD-10-CM

## 2025-03-14 DIAGNOSIS — M79.662 PAIN OF LEFT LOWER LEG: ICD-10-CM

## 2025-03-14 PROBLEM — R10.9 ABDOMINAL PAIN: Status: ACTIVE | Noted: 2025-03-14

## 2025-03-14 LAB
ALBUMIN UR-MCNC: NEGATIVE MG/DL
ANION GAP SERPL CALCULATED.3IONS-SCNC: 9 MMOL/L (ref 7–15)
APPEARANCE UR: CLEAR
BASOPHILS # BLD AUTO: 0 10E3/UL (ref 0–0.2)
BASOPHILS NFR BLD AUTO: 0 %
BILIRUB UR QL STRIP: NEGATIVE
BUN SERPL-MCNC: 16.8 MG/DL (ref 8–23)
C DIFF TOX B STL QL: NEGATIVE
CALCIUM SERPL-MCNC: 8.9 MG/DL (ref 8.8–10.4)
CHLORIDE SERPL-SCNC: 100 MMOL/L (ref 98–107)
COLOR UR AUTO: ABNORMAL
CREAT SERPL-MCNC: 0.75 MG/DL (ref 0.51–0.95)
EGFRCR SERPLBLD CKD-EPI 2021: 81 ML/MIN/1.73M2
EOSINOPHIL # BLD AUTO: 0.1 10E3/UL (ref 0–0.7)
EOSINOPHIL NFR BLD AUTO: 1 %
ERYTHROCYTE [DISTWIDTH] IN BLOOD BY AUTOMATED COUNT: 13.2 % (ref 10–15)
GLUCOSE SERPL-MCNC: 106 MG/DL (ref 70–99)
GLUCOSE UR STRIP-MCNC: NEGATIVE MG/DL
HCO3 SERPL-SCNC: 26 MMOL/L (ref 22–29)
HCT VFR BLD AUTO: 33.5 % (ref 35–47)
HGB BLD-MCNC: 11.1 G/DL (ref 11.7–15.7)
HGB UR QL STRIP: NEGATIVE
HOLD SPECIMEN: NORMAL
HOLD SPECIMEN: NORMAL
IMM GRANULOCYTES # BLD: 0.2 10E3/UL
IMM GRANULOCYTES NFR BLD: 1 %
KETONES UR STRIP-MCNC: NEGATIVE MG/DL
LACTATE SERPL-SCNC: 0.8 MMOL/L (ref 0.7–2)
LEUKOCYTE ESTERASE UR QL STRIP: NEGATIVE
LYMPHOCYTES # BLD AUTO: 1.5 10E3/UL (ref 0.8–5.3)
LYMPHOCYTES NFR BLD AUTO: 10 %
MCH RBC QN AUTO: 31.1 PG (ref 26.5–33)
MCHC RBC AUTO-ENTMCNC: 33.1 G/DL (ref 31.5–36.5)
MCV RBC AUTO: 94 FL (ref 78–100)
MONOCYTES # BLD AUTO: 1.8 10E3/UL (ref 0–1.3)
MONOCYTES NFR BLD AUTO: 12 %
MUCOUS THREADS #/AREA URNS LPF: PRESENT /LPF
NEUTROPHILS # BLD AUTO: 11.3 10E3/UL (ref 1.6–8.3)
NEUTROPHILS NFR BLD AUTO: 76 %
NITRATE UR QL: NEGATIVE
NRBC # BLD AUTO: 0 10E3/UL
NRBC BLD AUTO-RTO: 0 /100
PH UR STRIP: 5.5 [PH] (ref 5–7)
PLAT MORPH BLD: NORMAL
PLATELET # BLD AUTO: 277 10E3/UL (ref 150–450)
POTASSIUM SERPL-SCNC: 4.2 MMOL/L (ref 3.4–5.3)
RBC # BLD AUTO: 3.57 10E6/UL (ref 3.8–5.2)
RBC MORPH BLD: NORMAL
RBC URINE: 2 /HPF
SODIUM SERPL-SCNC: 135 MMOL/L (ref 135–145)
SP GR UR STRIP: 1.02 (ref 1–1.03)
UROBILINOGEN UR STRIP-MCNC: NORMAL MG/DL
WBC # BLD AUTO: 14.9 10E3/UL (ref 4–11)
WBC URINE: 1 /HPF

## 2025-03-14 PROCEDURE — 250N000013 HC RX MED GY IP 250 OP 250 PS 637: Performed by: STUDENT IN AN ORGANIZED HEALTH CARE EDUCATION/TRAINING PROGRAM

## 2025-03-14 PROCEDURE — 80048 BASIC METABOLIC PNL TOTAL CA: CPT | Performed by: EMERGENCY MEDICINE

## 2025-03-14 PROCEDURE — 74177 CT ABD & PELVIS W/CONTRAST: CPT

## 2025-03-14 PROCEDURE — 250N000011 HC RX IP 250 OP 636: Performed by: EMERGENCY MEDICINE

## 2025-03-14 PROCEDURE — 85025 COMPLETE CBC W/AUTO DIFF WBC: CPT | Performed by: EMERGENCY MEDICINE

## 2025-03-14 PROCEDURE — G0378 HOSPITAL OBSERVATION PER HR: HCPCS

## 2025-03-14 PROCEDURE — 93971 EXTREMITY STUDY: CPT | Mod: LT

## 2025-03-14 PROCEDURE — 36415 COLL VENOUS BLD VENIPUNCTURE: CPT | Performed by: EMERGENCY MEDICINE

## 2025-03-14 PROCEDURE — 83605 ASSAY OF LACTIC ACID: CPT | Performed by: EMERGENCY MEDICINE

## 2025-03-14 PROCEDURE — 87493 C DIFF AMPLIFIED PROBE: CPT | Performed by: EMERGENCY MEDICINE

## 2025-03-14 PROCEDURE — 81001 URINALYSIS AUTO W/SCOPE: CPT | Performed by: EMERGENCY MEDICINE

## 2025-03-14 PROCEDURE — 99285 EMERGENCY DEPT VISIT HI MDM: CPT | Mod: 25

## 2025-03-14 PROCEDURE — 250N000009 HC RX 250: Performed by: EMERGENCY MEDICINE

## 2025-03-14 PROCEDURE — 99223 1ST HOSP IP/OBS HIGH 75: CPT | Performed by: STUDENT IN AN ORGANIZED HEALTH CARE EDUCATION/TRAINING PROGRAM

## 2025-03-14 RX ORDER — IOPAMIDOL 755 MG/ML
500 INJECTION, SOLUTION INTRAVASCULAR ONCE
Status: COMPLETED | OUTPATIENT
Start: 2025-03-14 | End: 2025-03-14

## 2025-03-14 RX ORDER — ENALAPRIL MALEATE 10 MG/1
20 TABLET ORAL DAILY
Status: DISCONTINUED | OUTPATIENT
Start: 2025-03-15 | End: 2025-03-16 | Stop reason: HOSPADM

## 2025-03-14 RX ORDER — AMOXICILLIN 250 MG
2 CAPSULE ORAL 2 TIMES DAILY
Status: DISCONTINUED | OUTPATIENT
Start: 2025-03-14 | End: 2025-03-14

## 2025-03-14 RX ORDER — FLECAINIDE ACETATE 100 MG/1
100 TABLET ORAL 2 TIMES DAILY
Status: DISCONTINUED | OUTPATIENT
Start: 2025-03-14 | End: 2025-03-16 | Stop reason: HOSPADM

## 2025-03-14 RX ORDER — ONDANSETRON 4 MG/1
4 TABLET, ORALLY DISINTEGRATING ORAL EVERY 6 HOURS PRN
Status: DISCONTINUED | OUTPATIENT
Start: 2025-03-14 | End: 2025-03-16 | Stop reason: HOSPADM

## 2025-03-14 RX ORDER — AMLODIPINE BESYLATE 2.5 MG/1
2.5 TABLET ORAL AT BEDTIME
COMMUNITY

## 2025-03-14 RX ORDER — AMLODIPINE BESYLATE 2.5 MG/1
5 TABLET ORAL EVERY MORNING
COMMUNITY

## 2025-03-14 RX ORDER — ACETAMINOPHEN 650 MG/1
650 SUPPOSITORY RECTAL EVERY 4 HOURS PRN
Status: DISCONTINUED | OUTPATIENT
Start: 2025-03-14 | End: 2025-03-16 | Stop reason: HOSPADM

## 2025-03-14 RX ORDER — ACETAMINOPHEN 325 MG/1
650 TABLET ORAL EVERY 4 HOURS PRN
Status: DISCONTINUED | OUTPATIENT
Start: 2025-03-14 | End: 2025-03-16 | Stop reason: HOSPADM

## 2025-03-14 RX ORDER — HYDROCORTISONE 25 MG/G
CREAM TOPICAL 2 TIMES DAILY PRN
COMMUNITY
Start: 2025-02-05

## 2025-03-14 RX ORDER — DILTIAZEM HYDROCHLORIDE 180 MG/1
180 CAPSULE, COATED, EXTENDED RELEASE ORAL 2 TIMES DAILY
Status: DISCONTINUED | OUTPATIENT
Start: 2025-03-14 | End: 2025-03-16 | Stop reason: HOSPADM

## 2025-03-14 RX ORDER — AMLODIPINE BESYLATE 5 MG/1
5 TABLET ORAL EVERY MORNING
Status: DISCONTINUED | OUTPATIENT
Start: 2025-03-15 | End: 2025-03-16 | Stop reason: HOSPADM

## 2025-03-14 RX ORDER — PROCHLORPERAZINE MALEATE 5 MG/1
5 TABLET ORAL EVERY 6 HOURS PRN
Status: DISCONTINUED | OUTPATIENT
Start: 2025-03-14 | End: 2025-03-16 | Stop reason: HOSPADM

## 2025-03-14 RX ORDER — ONDANSETRON 2 MG/ML
4 INJECTION INTRAMUSCULAR; INTRAVENOUS EVERY 6 HOURS PRN
Status: DISCONTINUED | OUTPATIENT
Start: 2025-03-14 | End: 2025-03-16 | Stop reason: HOSPADM

## 2025-03-14 RX ORDER — METHENAMINE HIPPURATE 1000 MG/1
1 TABLET ORAL 2 TIMES DAILY
Status: DISCONTINUED | OUTPATIENT
Start: 2025-03-14 | End: 2025-03-16 | Stop reason: HOSPADM

## 2025-03-14 RX ORDER — AMLODIPINE BESYLATE 2.5 MG/1
2.5 TABLET ORAL
Status: DISCONTINUED | OUTPATIENT
Start: 2025-03-14 | End: 2025-03-16 | Stop reason: HOSPADM

## 2025-03-14 RX ORDER — PANTOPRAZOLE SODIUM 40 MG/1
40 TABLET, DELAYED RELEASE ORAL DAILY
Status: DISCONTINUED | OUTPATIENT
Start: 2025-03-15 | End: 2025-03-16 | Stop reason: HOSPADM

## 2025-03-14 RX ORDER — POLYETHYLENE GLYCOL 3350 17 G/17G
17 POWDER, FOR SOLUTION ORAL 2 TIMES DAILY PRN
Status: DISCONTINUED | OUTPATIENT
Start: 2025-03-14 | End: 2025-03-16 | Stop reason: HOSPADM

## 2025-03-14 RX ORDER — AMOXICILLIN 250 MG
2 CAPSULE ORAL 2 TIMES DAILY PRN
Status: DISCONTINUED | OUTPATIENT
Start: 2025-03-14 | End: 2025-03-16 | Stop reason: HOSPADM

## 2025-03-14 RX ORDER — AMOXICILLIN 250 MG
1 CAPSULE ORAL 2 TIMES DAILY PRN
Status: DISCONTINUED | OUTPATIENT
Start: 2025-03-14 | End: 2025-03-16 | Stop reason: HOSPADM

## 2025-03-14 RX ORDER — AMOXICILLIN 250 MG
1 CAPSULE ORAL 2 TIMES DAILY
Status: DISCONTINUED | OUTPATIENT
Start: 2025-03-14 | End: 2025-03-14

## 2025-03-14 RX ADMIN — IOPAMIDOL 90 ML: 755 INJECTION, SOLUTION INTRAVENOUS at 12:48

## 2025-03-14 RX ADMIN — ACETAMINOPHEN 650 MG: 325 TABLET, FILM COATED ORAL at 21:16

## 2025-03-14 RX ADMIN — METHENAMINE HIPPURATE 1 G: 1000 TABLET ORAL at 20:42

## 2025-03-14 RX ADMIN — FLECAINIDE ACETATE 100 MG: 100 TABLET ORAL at 20:42

## 2025-03-14 RX ADMIN — AMLODIPINE BESYLATE 2.5 MG: 2.5 TABLET ORAL at 20:47

## 2025-03-14 RX ADMIN — RIVAROXABAN 20 MG: 10 TABLET, FILM COATED ORAL at 20:42

## 2025-03-14 RX ADMIN — SODIUM CHLORIDE 100 ML: 9 INJECTION, SOLUTION INTRAVENOUS at 12:48

## 2025-03-14 RX ADMIN — DILTIAZEM HYDROCHLORIDE 180 MG: 180 CAPSULE, COATED, EXTENDED RELEASE ORAL at 20:42

## 2025-03-14 ASSESSMENT — COLUMBIA-SUICIDE SEVERITY RATING SCALE - C-SSRS
1. IN THE PAST MONTH, HAVE YOU WISHED YOU WERE DEAD OR WISHED YOU COULD GO TO SLEEP AND NOT WAKE UP?: NO
2. HAVE YOU ACTUALLY HAD ANY THOUGHTS OF KILLING YOURSELF IN THE PAST MONTH?: NO
6. HAVE YOU EVER DONE ANYTHING, STARTED TO DO ANYTHING, OR PREPARED TO DO ANYTHING TO END YOUR LIFE?: NO

## 2025-03-14 ASSESSMENT — ACTIVITIES OF DAILY LIVING (ADL)
ADLS_ACUITY_SCORE: 41
ADLS_ACUITY_SCORE: 38
ADLS_ACUITY_SCORE: 41
ADLS_ACUITY_SCORE: 38
ADLS_ACUITY_SCORE: 41
ADLS_ACUITY_SCORE: 38
ADLS_ACUITY_SCORE: 27
ADLS_ACUITY_SCORE: 41
ADLS_ACUITY_SCORE: 38

## 2025-03-14 NOTE — ED PROVIDER NOTES
"  Emergency Department Note      History of Present Illness     Chief Complaint   Abdominal Pain    HPI   Ebony Pinzon is a 79 year old female with a history of C diff presenting with abdominal pain. The patient underwent a bladder prolapse procedure at [facility] three days ago (3/11/25). She had not had a bowel movement since undergoing surgery and was told to take senna and fiber. This morning, she reports pain with bearing down to have a bowel movement with a near syncopal episode. No loss of consciousness. She reports nausea and one episode of vomiting after. Her pain improved following a bowel movement after arriving to the ED. The patient still has a quiroga catheter in place, which is due to be removed on Monday (3/17/25).     Independent Historian   None    Review of External Notes   I reviewed the surgery note from 3/11/25.     Past Medical History     Medical History and Problem List   Anxiety  Hypertension   Fibromyalgia  GERD  IBS  PAF  Urethral caruncle  Vaginal wall prolapse     Medications   amLODIPine   diltiazem ER   enalapril   flecainide   Gemtesa   methenamine hippurate   omeprazole   rivaroxaban      Surgical History   Artery surgery  Carpal tunnel release bilateral  Tonsillectomy  TKA    Physical Exam     Patient Vitals for the past 24 hrs:   BP Temp Temp src Pulse Resp SpO2 Height Weight   03/14/25 1746 (!) 158/60 98.1  F (36.7  C) Oral 82 16 -- 1.702 m (5' 7\") 72.9 kg (160 lb 11.2 oz)   03/14/25 1728 -- -- -- -- -- 93 % -- --   03/14/25 1727 (!) 149/68 -- -- 79 -- -- -- --   03/14/25 1616 (!) 141/67 -- -- 84 -- -- -- --   03/14/25 1601 (!) 147/67 -- -- 79 -- -- -- --   03/14/25 1546 (!) 144/65 -- -- 84 -- 92 % -- --   03/14/25 1531 (!) 143/66 -- -- 75 -- 95 % -- --   03/14/25 1516 (!) 148/77 -- -- 72 -- 96 % -- --   03/14/25 1145 132/59 97.4  F (36.3  C) Oral 72 16 96 % -- --     Physical Exam  General:              Well-nourished              Speaking in full sentences  Eyes:          "     Conjunctiva without injection or scleral icterus  ENT:              Moist mucous membranes              Nares patent              Pinnae normal  Neck:              Full ROM              No stiffness appreciated  Resp:              Lungs CTAB              No crackles, wheezing or audible rubs              Good air movement  CV:                    Normal rate, regular rhythm              S1 and S2 present              No murmur, gallop or rub  GI:              BS present              Abdomen soft without distention              TTP to lower abdomen              No palpable masses              No guarding or rebound tenderness  Skin:              Warm, dry, well perfused              No rashes or open wounds on exposed skin  MSK:              Moves all extremities              No focal deformities or swelling  Neuro:              Alert              Answers questions appropriately              Moves all extremities equally              Gait stable  Psych:              Normal affect, normal mood    Diagnostics     Lab Results   Labs Ordered and Resulted from Time of ED Arrival to Time of ED Departure   BASIC METABOLIC PANEL - Abnormal       Result Value    Sodium 135      Potassium 4.2      Chloride 100      Carbon Dioxide (CO2) 26      Anion Gap 9      Urea Nitrogen 16.8      Creatinine 0.75      GFR Estimate 81      Calcium 8.9      Glucose 106 (*)    CBC WITH PLATELETS AND DIFFERENTIAL - Abnormal    WBC Count 14.9 (*)     RBC Count 3.57 (*)     Hemoglobin 11.1 (*)     Hematocrit 33.5 (*)     MCV 94      MCH 31.1      MCHC 33.1      RDW 13.2      Platelet Count 277      % Neutrophils 76      % Lymphocytes 10      % Monocytes 12      % Eosinophils 1      % Basophils 0      % Immature Granulocytes 1      NRBCs per 100 WBC 0      Absolute Neutrophils 11.3 (*)     Absolute Lymphocytes 1.5      Absolute Monocytes 1.8 (*)     Absolute Eosinophils 0.1      Absolute Basophils 0.0      Absolute Immature Granulocytes 0.2       Absolute NRBCs 0.0     ROUTINE UA WITH MICROSCOPIC REFLEX TO CULTURE - Abnormal    Color Urine Light Yellow      Appearance Urine Clear      Glucose Urine Negative      Bilirubin Urine Negative      Ketones Urine Negative      Specific Gravity Urine 1.023      Blood Urine Negative      pH Urine 5.5      Protein Albumin Urine Negative      Urobilinogen Urine Normal      Nitrite Urine Negative      Leukocyte Esterase Urine Negative      Mucus Urine Present (*)     RBC Urine 2      WBC Urine 1     LACTIC ACID WHOLE BLOOD WITH 1X REPEAT IN 2 HR WHEN >2 - Normal    Lactic Acid, Initial 0.8     RBC AND PLATELET MORPHOLOGY    RBC Morphology Confirmed RBC Indices      Platelet Assessment        Value: Automated Count Confirmed. Platelet morphology is normal.       Imaging   US Lower Extremity Venous Duplex Left   Final Result   IMPRESSION:   No deep venous thrombosis in the left lower extremity.         CT Abdomen Pelvis w Contrast   Final Result   IMPRESSION:    1.  Wall thickening of the urinary bladder that appears edematous may represent a cystitis.   2.  Some wall prominence of the descending and sigmoid colon suggesting a distal colitis. No abscess identified.   3.  Edema at the pelvic floor is likely postoperative. No fluid collection identified.   4.  Cholelithiasis.   5.  Colonic diverticulosis.        Independent Interpretation   None    ED Course      Medications Administered   Medications   sodium chloride 0.9 % bag 100 mL for CT scan flush use (100 mLs As instructed $Given 3/14/25 1241)   amLODIPine (NORVASC) tablet 5 mg (has no administration in time range)   amLODIPine (NORVASC) tablet 2.5 mg (has no administration in time range)   diltiazem ER COATED BEADS (CARDIZEM CD/CARTIA XT) 24 hr capsule 180 mg (has no administration in time range)   enalapril (VASOTEC) tablet 20 mg (has no administration in time range)   flecainide (TAMBOCOR) tablet 100 mg (has no administration in time range)   vibegron (GEMTESA)  tablet 75 mg (has no administration in time range)   pantoprazole (PROTONIX) EC tablet 40 mg (has no administration in time range)   rivaroxaban ANTICOAGULANT (XARELTO) tablet 20 mg (has no administration in time range)   methenamine hippurate (HIPREX) tablet 1 g (has no administration in time range)   ondansetron (ZOFRAN ODT) ODT tab 4 mg (has no administration in time range)     Or   ondansetron (ZOFRAN) injection 4 mg (has no administration in time range)   prochlorperazine (COMPAZINE) injection 5 mg (has no administration in time range)     Or   prochlorperazine (COMPAZINE) tablet 5 mg (has no administration in time range)   acetaminophen (TYLENOL) tablet 650 mg (has no administration in time range)     Or   acetaminophen (TYLENOL) Suppository 650 mg (has no administration in time range)   polyethylene glycol (MIRALAX) Packet 17 g (has no administration in time range)   senna-docusate (SENOKOT-S/PERICOLACE) 8.6-50 MG per tablet 1 tablet (has no administration in time range)     Or   senna-docusate (SENOKOT-S/PERICOLACE) 8.6-50 MG per tablet 2 tablet (has no administration in time range)   iopamidol (ISOVUE-370) solution 500 mL (90 mLs Intravenous $Given 3/14/25 1248)       Procedures   Procedures     Discussion of Management   Admitting hospitalist    ED Course   ED Course as of 03/14/25 2031   Fri Mar 14, 2025   1207 I obtained the history and examined the patient as noted above.      1407 Patient reevaluated.   1533 Patient reevaluated.       Additional Documentation  None    Medical Decision Making / Diagnosis     CMS Diagnoses: None    MIPS       None    MDM   Ebony Pinzon is a 79 year old female presenting to the ER for evaluation of abdominal pain.  VS on presentation reveal no acute abnormalities.  Workup included advanced imaging, as well as laboratory studies.  Acknowledging recent surgical intervention, advanced imaging was pursued.  CT abdomen/pelvis demonstrates findings of distal colitis,  though no evidence of abscess, or perforation.  At this time, it does not appear to be suggestive of acute diverticulitis.  Patient also has wall thickening of the urinary bladder which I question if this may be secondary to bladder decompression in the context of [place] catheter.  Urinalysis today does not suggest infection.  As patient's ED course progressed, she has noted multiple episodes of diarrhea.  I suspect this is most likely mobilization of bowel movements following surgery.  She did express concern for recurrent C. difficile.  Testing will be obtained and currently is pending, though overall low suspicion for this.  Her labs are notable for mild leukocytosis, which is nonspecific.  At this point, do not feel further empiric antibiotic therapy necessary.  No clear postoperative complication is identified.  On reassessment, patient acknowledges feeling generalized weakness, persistent cramping and does not feel she is safe to return home at this time.  Her spouse also requires assistance and is not able to provide much support at this time and his current medical condition as well.  On reassessment, the patient was acknowledging some discomfort to the medial aspect of her left thigh.  She attributes this to a placement during surgery.  Venous ultrasound was obtained which is negative for DVT.  No other signs of endorgan malperfusion.  Will plan hospital observation for supportive treatment and care.  Case discussed with hospital team who have accepted patient for admission.    Disposition   The patient was admitted to the hospital.     Diagnosis     ICD-10-CM    1. Generalized weakness  R53.1       2. Diarrhea, unspecified type  R19.7       3. Generalized abdominal pain  R10.84       4. Pain of left lower leg  M79.662           Scribe Disclosure:  Luann SHAH, am serving as a scribe at 11:53 AM on 3/14/2025 to document services personally performed by Roland Ibanez MD based on my  observations and the provider's statements to me.        Roland Ibanez MD  03/14/25 2035

## 2025-03-14 NOTE — PLAN OF CARE
"Pt admitted to unit around 1800. VSS on RA, A/Ox4, calm and cooperative, denies pain at this time. Pt is with her . Has quiroga in place due to bladder surgery several days ago at [place].     Goal Outcome Evaluation:      Plan of Care Reviewed With: patient    Overall Patient Progress: no changeOverall Patient Progress: no change    Outcome Evaluation: Admitted to unit. VSS on RA.      Problem: Adult Inpatient Plan of Care  Goal: Plan of Care Review  Description: The Plan of Care Review/Shift note should be completed every shift.  The Outcome Evaluation is a brief statement about your assessment that the patient is improving, declining, or no change.  This information will be displayed automatically on your shift  note.  Outcome: Progressing  Flowsheets (Taken 3/14/2025 1827)  Outcome Evaluation: Admitted to unit. VSS on RA.  Plan of Care Reviewed With: patient  Overall Patient Progress: no change  Goal: Patient-Specific Goal (Individualized)  Description: You can add care plan individualizations to a care plan. Examples of Individualization might be:  \"Parent requests to be called daily at 9am for status\", \"I have a hard time hearing out of my right ear\", or \"Do not touch me to wake me up as it startles  me\".  Outcome: Progressing  Goal: Absence of Hospital-Acquired Illness or Injury  Outcome: Progressing  Intervention: Identify and Manage Fall Risk  Recent Flowsheet Documentation  Taken 3/14/2025 1800 by Indy Carvalho RN  Safety Promotion/Fall Prevention: activity supervised  Intervention: Prevent Infection  Recent Flowsheet Documentation  Taken 3/14/2025 1800 by Indy Carvalho RN  Infection Prevention:   cohorting utilized   equipment surfaces disinfected  Goal: Optimal Comfort and Wellbeing  Outcome: Progressing  Goal: Readiness for Transition of Care  Outcome: Progressing     Problem: Delirium  Goal: Optimal Coping  Outcome: Progressing  Goal: Improved Behavioral Control  Outcome: " Progressing  Intervention: Minimize Safety Risk  Recent Flowsheet Documentation  Taken 3/14/2025 1800 by Indy Carvalho RN  Enhanced Safety Measures: assistive devices when indicated  Goal: Improved Attention and Thought Clarity  Outcome: Progressing  Goal: Improved Sleep  Outcome: Progressing     Problem: Infection  Goal: Absence of Infection Signs and Symptoms  Outcome: Progressing

## 2025-03-14 NOTE — ED TRIAGE NOTES
Pt had bladder prolapse surgery at [facility] on Tuesday. Today, she had a painful bowel movement (first since surgery) when she gurpreet tto bear down. Pt also reported dizziness at that time and had nausea afterwards. Pt did vomit once. Pt had one formed BM prior to EMS arrival with no blood. Pt had another BM upon arrival. States she is having abdominal pain different from surgical pain. Pt given 4 mg zofran IV. Pt does have [place] catheter in place - set to have it removed on Monday. No obvious blood noted in perineal area when radha cares completed.      Triage Assessment (Adult)       Row Name 03/14/25 1146          Triage Assessment    Airway WDL WDL        Respiratory WDL    Respiratory WDL WDL        Skin Circulation/Temperature WDL    Skin Circulation/Temperature WDL WDL        Cardiac WDL    Cardiac WDL WDL        Peripheral/Neurovascular WDL    Peripheral Neurovascular WDL WDL        Cognitive/Neuro/Behavioral WDL    Cognitive/Neuro/Behavioral WDL WDL

## 2025-03-14 NOTE — H&P
[facility]  History and Physical - Hospitalist Service  Date of Admission:  3/14/2025     Assessment & Plan     Ms. Ebony Pinzon is a 79 year old female w/ hx of recent urologic procedure for prolapse, history of atrial fibrillation on Xarelto, IBS, fibromyalgia, GERD, hypertension admitted on 3/14/2025 who presented with abdominal pain, after a bowel movement admitted for monitoring overnight with plans for likely discharge in AM .     Diarrhea w/ leukocytosis   Hx of IBS  Colonic diverticulosis w/o diverticulitis noted on imaging   Patient with a history of painful bowel movement prior to arrival in the setting of recent urologic procedure at [place]. Had dizziness and nausea after this bowel movemebt and one episode of vomiting, presented to the ED for evaluation due to ongoing abdominal pain. No true syncope. VSS on arrival WNL. Labs revealed leukocytosis and mild anemia, largely unremarkable with normal lactic acid otherwise. CT w/ Some wall prominence of the descending and sigmoid colon suggesting a distal colitis. No abscess identified and colonic diverticulosis.  Patient and  unable to care for patient at home and prefer to stay in hospital overnight due to fear of worsening symptoms. Admitted under obs. Patient and  understood and agreed to the plan, all questions answered.   - advance diet as tolerated   - GI cocktail PRN   - pain control with PRN tylenol otherwise   - PRN bowel and antiemetic meds   - pt with a history of cdif but no indication for testing at this time, had constipation before episodes of diarrhea she is currently experiencing, has been afebrile   - repeat labs in AM     Hx of recent urologic procedure for prolapse 3/11/25   -CT w/ wall thickening of the urinary bladder that appears edematous may represent a cystitis.  -UA wihtout evidence of infection   -catheter removal planned as an outpatient on Monday     LLE swelling   -LE US without evidence  "for DVT    -unclear cause of LE edema, does wear compression stockings in outpatient setting, recommend ongoing outpatient work up     Cholelithiasis, incidentally noted   - will monitor     Normocytic anemia, mild   - no acute bleeding     Hx of atrial fibrillation on Xarelto  - PTA diltiazem, flecainide, enalapril, rivaroxaban     Fibromyalgia  - noted     GERD  - PTA omeprazole     Hypertension   - PTA amlodipine         Observation Goals: -diagnostic tests and consults completed and resulted, -vital signs normal or at patient baseline, Nurse to notify provider when observation goals have been met and patient is ready for discharge.  Diet: Regular Diet Adult   DVT Prophylaxis: DOAC  [place] Catheter: PRESENT, indication:    Lines: None     Cardiac Monitoring: None  Code Status: Full Code    Clinically Significant Risk Factors Present on Admission                # Drug Induced Coagulation Defect: home medication list includes an anticoagulant medication    # Hypertension: Home medication list includes antihypertensive(s)           # Overweight: Estimated body mass index is 25.17 kg/m  as calculated from the following:    Height as of this encounter: 1.702 m (5' 7\").    Weight as of this encounter: 72.9 kg (160 lb 11.2 oz).              Disposition Plan     Medically Ready for Discharge: Anticipated Tomorrow           Nadia Kearney DO  Hospitalist Service  [facility]  Securely message with [employer] (more info)  Text page via [employer] Paging/Directory     ______________________________________________________________________    Chief Complaint   Abdominal pain, nausea and vomiting     History is obtained from the patient, electronic health record, emergency department physician, and patient's significant other    History of Present Illness    Ms. Ebony Pinzon is a 79 year old female w/ hx of recent urologic procedure for prolapse, history of atrial fibrillation on Xarelto, IBS, " fibromyalgia, GERD, hypertension admitted on 3/14/2025 who presented with abdominal pain, after a bowel movement.     Patient with a history of painful bowel movement prior to arrival in the setting of recent urologic procedure at [place]. Had dizziness and nausea after this bowel movemebt and one episode of vomiting, presented to the ED for evaluation due to ongoing abdominal pain. No true syncope. Does report a history of irregular bowel movement and this is not abnormal for her. Does not report any other changes, no new meds. No other concerns or complaints today.      is at bedside and corroborates history.     Past Medical History    Past Medical History:   Diagnosis Date    Anxiety     Benign essential hypertension     Fibromyalgia     Gastroesophageal reflux disease     Irritable bowel syndrome     Paroxysmal atrial fibrillation     Urethral caruncle     Vaginal wall prolapse        Past Surgical History   Past Surgical History:   Procedure Laterality Date    CARPAL TUNNEL RELEASE RT/LT      TONSILLECTOMY      TOTAL KNEE ARTHROPLASTY         Prior to Admission Medications   Prior to Admission Medications   Prescriptions Last Dose Informant Patient Reported? Taking?   CRANBERRY PO 3/13/2025 Noon  Yes Yes   Sig: Take 1 capsule by mouth daily.   Calcium Carb-Cholecalciferol (CALCIUM 500+D3 PO) 3/13/2025 Noon  Yes Yes   Sig: Take 1 tablet by mouth daily.   Probiotic Product (PROBIOTIC ADVANCED PO) 3/13/2025 Noon  Yes Yes   Sig: Take 1 capsule by mouth daily.   amLODIPine (NORVASC) 2.5 MG tablet 3/14/2025 Morning  Yes Yes   Sig: Take 5 mg by mouth every morning.   amLODIPine (NORVASC) 2.5 MG tablet 3/13/2025 Bedtime  Yes Yes   Sig: Take 2.5 mg by mouth at bedtime.   cephALEXin (KEFLEX) 250 MG capsule 3/14/2025 Morning  Yes Yes   Sig: Take 250 mg by mouth daily.   diltiazem ER (TIAZAC) 180 MG 24 hr ER beaded capsule 3/14/2025 Morning  No Yes   Sig: Take 1 capsule (180 mg) by mouth 2 times daily.   enalapril  (VASOTEC) 20 MG tablet 3/14/2025 Morning  No Yes   Sig: Take 1 tablet (20 mg) by mouth daily.   estradiol (ESTRACE) 0.1 MG/GM vaginal cream 3/12/2025 Bedtime  Yes Yes   Sig: Place vaginally three times a week. On Monday, Wednesday, Friday at bedtime   flecainide (TAMBOCOR) 100 MG tablet 3/14/2025 Morning  No Yes   Sig: Take 1 tablet (100 mg) by mouth 2 times daily.   hydrocortisone 2.5 % cream   Yes Yes   Sig: Apply topically 2 times daily as needed.   latanoprost (XALATAN) 0.005 % ophthalmic solution 3/13/2025 Bedtime  Yes Yes   Sig: Apply 1 drop to eye at bedtime.   methenamine hippurate (HIPREX) 1 g tablet 3/14/2025 Morning  Yes Yes   Sig: Take 1 g by mouth 2 times daily   multivitamin (CENTRUM SILVER) tablet 3/14/2025 Morning  Yes Yes   Sig: Take 1 tablet by mouth daily   omeprazole (PRILOSEC) 20 MG DR capsule 3/13/2025 Noon  Yes Yes   Sig: Take 20 mg by mouth daily   rivaroxaban ANTICOAGULANT (XARELTO ANTICOAGULANT) 20 MG TABS tablet 3/13/2025 Noon  No Yes   Sig: Take 1 tablet (20 mg) by mouth daily (with dinner).      Facility-Administered Medications: None           Physical Exam   Vital Signs: Temp: 98.1  F (36.7  C) Temp src: Oral BP: (!) 158/60 Pulse: 82   Resp: 16 SpO2: 93 % O2 Device: None (Room air)    Weight: 160 lbs 11.2 oz    Constitutional: awake, alert, cooperative, no apparent distress, and appears stated age  HEENT: Normocephalic, atraumatic  Respiratory: Normal work of breathing, good air exchange, clear to auscultation bilaterally, no crackles or wheezing noted    Cardiovascular: Regular rate and rhythm, no murmurs appreciated  GI: Soft and minimally tender to palpation, nondistended, no rebound or guarding   Skin: normal skin color, texture, turgor  Musculoskeletal: No deformities, LLE trace edema present as compared to RLE   Neurologic: Alert and oriented, no focal deficits   Neuropsychiatric: General: normal, calm and normal eye contact      Medical Decision Making       75 MINUTES SPENT BY  ME on the date of service doing chart review, history, exam, documentation & further activities per the note.      Data   ------------------------- PAST 24 HR DATA REVIEWED -----------------------------------------------    I have personally reviewed the following data over the past 24 hrs:    14.9 (H)  \   11.1 (L)   / 277     135 100 16.8 /  106 (H)   4.2 26 0.75 \     Procal: N/A CRP: N/A Lactic Acid: 0.8         Imaging results reviewed over the past 24 hrs:   Recent Results (from the past 24 hours)   CT Abdomen Pelvis w Contrast    Narrative    EXAM: CT ABDOMEN PELVIS W CONTRAST  LOCATION: [facility]  DATE: 3/14/2025    INDICATION: Lower abd pain, recent bladder prolapse surgery at [place].  COMPARISON: CT 12/20/2023.  TECHNIQUE: CT scan of the abdomen and pelvis was performed following injection of IV contrast. Multiplanar reformats were obtained. Dose reduction techniques were used.  CONTRAST: 90 mL Isovue 370    FINDINGS:   LOWER CHEST: Small hiatal hernia appears stable.    HEPATOBILIARY: Cholelithiasis. No acute liver abnormality.    PANCREAS: Stable atrophy. No acute pancreas abnormality identified.    SPLEEN: Normal.    ADRENAL GLANDS: Stable left adrenal thickening. Unremarkable right adrenal.    KIDNEYS/BLADDER: No hydronephrosis or stone. 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 Catheter within the urinary bladder. There is diffuse wall thickening of the bladder that appears edematous. A few tiny   bubbles of gas within the bladder lumen suggested.    BOWEL: There is a degree of wall thickening involving the descending colon and sigmoid. Sigmoid diverticulosis. No focal diverticulitis identified. Nothing for appendicitis. No abscess identified.    LYMPH NODES: Normal.    VASCULATURE: Scattered calcifications.    PELVIC ORGANS: Uterus is present. No suspicious adnexal lesion. A few stable pelvic calcifications. There is a mild degree of  edema along the pelvic floor. No focal fluid collection identified.    MUSCULOSKELETAL: Spine degenerative changes. No focal bone lesion.      Impression    IMPRESSION:   1.  Wall thickening of the urinary bladder that appears edematous may represent a cystitis.  2.  Some wall prominence of the descending and sigmoid colon suggesting a distal colitis. No abscess identified.  3.  Edema at the pelvic floor is likely postoperative. No fluid collection identified.  4.  Cholelithiasis.  5.  Colonic diverticulosis.   US Lower Extremity Venous Duplex Left    Narrative    EXAM: US LOWER EXTREMITY VENOUS DUPLEX LEFT  LOCATION: [facility]  DATE: 3/14/2025    INDICATION: thigh pain, post op, ?dvt  COMPARISON: None.  TECHNIQUE: Venous Duplex ultrasound of the left lower extremity with and without compression, augmentation and duplex. Color flow and spectral Doppler with waveform analysis performed.    FINDINGS: Exam includes the common femoral, femoral, popliteal, and contralateral common femoral veins as well as segmentally visualized deep calf veins and greater saphenous vein.     LEFT: No deep vein thrombosis. Trace echogenic wall thickening or calcification along the common femoral vein posteriorly, which may representing chronic postthrombotic change versus calcifications and likely of no clinical significance. The CFV appears   widely patent and fully compressible.  No superficial thrombophlebitis. No popliteal cyst.      Impression    IMPRESSION:  No deep venous thrombosis in the left lower extremity.

## 2025-03-14 NOTE — ED NOTES
[facility]  ED Nurse Handoff Report    ED Chief complaint: Abdominal Pain  . ED Diagnosis:   Final diagnoses:   Generalized weakness   Diarrhea, unspecified type   Generalized abdominal pain   Pain of left lower leg       Allergies:   Allergies   Allergen Reactions    Amoxicillin-Pot Clavulanate      Other reaction(s): GI intolerance  C-Diff    Codeine Nausea    Neomycin      Other Reaction(s): *Unknown, Other (see comments)       Code Status: Full Code    Activity level - Baseline/Home:  independent.  Activity Level - Current:   standby.   Lift room needed: No.   Bariatric: No   Needed: No   Isolation: Yes.   Infection: Not Applicable  C-Diff Pending.     Respiratory status: Room air    Vital Signs (within 30 minutes):   Vitals:    03/14/25 1145   BP: 132/59   Pulse: 72   Resp: 16   Temp: 97.4  F (36.3  C)   TempSrc: Oral   SpO2: 96%       Cardiac Rhythm:  ,      Pain level:    Patient confused: No.   Patient Falls Risk: nonskid shoes/slippers when out of bed, patient and family education, assistive device/personal items within reach, activity supervised, mobility aid in reach, and room door open.   Elimination Status: Urethral catheter (quiroga) in place; orders for patient to discharge with quiroga      Patient Report - Initial Complaint: Abdominal pain.   Focused Assessment: Ebony Pinzon is a 79 year old female with a history of C diff presenting with abdominal pain. The patient underwent a bladder prolapse procedure at [facility] three days ago (3/11/25). She had not had a bowel movement since undergoing surgery and was told to take senna and fiber. This morning, she reports pain with bearing down to have a bowel movement with a near syncopal episode. No loss of consciousness. She reports nausea and one episode of vomiting after. Her pain improved following a bowel movement after arriving to the ED. The patient still has a quiroga catheter in place, which is due to be removed  on Monday (3/17/25).      Abnormal Results:   Labs Ordered and Resulted from Time of ED Arrival to Time of ED Departure   BASIC METABOLIC PANEL - Abnormal       Result Value    Sodium 135      Potassium 4.2      Chloride 100      Carbon Dioxide (CO2) 26      Anion Gap 9      Urea Nitrogen 16.8      Creatinine 0.75      GFR Estimate 81      Calcium 8.9      Glucose 106 (*)    CBC WITH PLATELETS AND DIFFERENTIAL - Abnormal    WBC Count 14.9 (*)     RBC Count 3.57 (*)     Hemoglobin 11.1 (*)     Hematocrit 33.5 (*)     MCV 94      MCH 31.1      MCHC 33.1      RDW 13.2      Platelet Count 277      % Neutrophils 76      % Lymphocytes 10      % Monocytes 12      % Eosinophils 1      % Basophils 0      % Immature Granulocytes 1      NRBCs per 100 WBC 0      Absolute Neutrophils 11.3 (*)     Absolute Lymphocytes 1.5      Absolute Monocytes 1.8 (*)     Absolute Eosinophils 0.1      Absolute Basophils 0.0      Absolute Immature Granulocytes 0.2      Absolute NRBCs 0.0     ROUTINE UA WITH MICROSCOPIC REFLEX TO CULTURE - Abnormal    Color Urine Light Yellow      Appearance Urine Clear      Glucose Urine Negative      Bilirubin Urine Negative      Ketones Urine Negative      Specific Gravity Urine 1.023      Blood Urine Negative      pH Urine 5.5      Protein Albumin Urine Negative      Urobilinogen Urine Normal      Nitrite Urine Negative      Leukocyte Esterase Urine Negative      Mucus Urine Present (*)     RBC Urine 2      WBC Urine 1     LACTIC ACID WHOLE BLOOD WITH 1X REPEAT IN 2 HR WHEN >2 - Normal    Lactic Acid, Initial 0.8     RBC AND PLATELET MORPHOLOGY    RBC Morphology Confirmed RBC Indices      Platelet Assessment        Value: Automated Count Confirmed. Platelet morphology is normal.   C. DIFFICILE TOXIN B PCR WITH REFLEX TO C. DIFFICILE EIA        CT Abdomen Pelvis w Contrast   Final Result   IMPRESSION:    1.  Wall thickening of the urinary bladder that appears edematous may represent a cystitis.   2.  Some wall  prominence of the descending and sigmoid colon suggesting a distal colitis. No abscess identified.   3.  Edema at the pelvic floor is likely postoperative. No fluid collection identified.   4.  Cholelithiasis.   5.  Colonic diverticulosis.      US Lower Extremity Venous Duplex Left    (Results Pending)       Treatments provided: IV, labs, imaging  Family Comments: spouse at bedside  OBS brochure/video discussed/provided to patient:  Yes  ED Medications:   Medications   sodium chloride 0.9 % bag 100 mL for CT scan flush use (100 mLs As instructed $Given 3/14/25 4225)   iopamidol (ISOVUE-370) solution 500 mL (90 mLs Intravenous $Given 3/14/25 1245)       Drips infusing:  No  For the majority of the shift this patient was Green.   Interventions performed were updated on plan of care and interventions, available for toileting to the commode.    Sepsis treatment initiated: No    Cares/treatment/interventions/medications to be completed following ED care: admission orders    ED Nurse Name: Mari Natarajan RN  5:00 PM

## 2025-03-14 NOTE — PHARMACY-ADMISSION MEDICATION HISTORY
Pharmacy Intern Admission Medication History    Admission medication history is complete. The information provided in this note is only as accurate as the sources available at the time of the update.    Information Source(s): Patient and CareEverywhere/SureScripts via in-person    Pertinent Information:     -Pt stopped GEMTESTA 75 mg tab per PCP because it caused urinary retention. Alternative plans weren't discussed due to planned surgery last Monday.   -Pt started Cephalexin 250 mg yesterday for 4 days      Changes made to PTA medication list:  Added: Cephalexin and hydrocortisone  Deleted: Gemtesa  Changed: None    Allergies reviewed with patient and updates made in EHR: yes    Medication History Completed By: Jose Sampson 3/14/2025 4:57 PM    PTA Med List   Medication Sig Last Dose/Taking    amLODIPine (NORVASC) 2.5 MG tablet Take 5 mg by mouth every morning. 3/14/2025 Morning    amLODIPine (NORVASC) 2.5 MG tablet Take 2.5 mg by mouth at bedtime. 3/13/2025 Bedtime    Calcium Carb-Cholecalciferol (CALCIUM 500+D3 PO) Take 1 tablet by mouth daily. 3/13/2025 Noon    cephALEXin (KEFLEX) 250 MG capsule Take 250 mg by mouth daily. 3/14/2025 Morning    CRANBERRY PO Take 1 capsule by mouth daily. 3/13/2025 Noon    diltiazem ER (TIAZAC) 180 MG 24 hr ER beaded capsule Take 1 capsule (180 mg) by mouth 2 times daily. 3/14/2025 Morning    enalapril (VASOTEC) 20 MG tablet Take 1 tablet (20 mg) by mouth daily. 3/14/2025 Morning    estradiol (ESTRACE) 0.1 MG/GM vaginal cream Place vaginally three times a week. On Monday, Wednesday, Friday at bedtime 3/12/2025 Bedtime    flecainide (TAMBOCOR) 100 MG tablet Take 1 tablet (100 mg) by mouth 2 times daily. 3/14/2025 Morning    hydrocortisone 2.5 % cream Apply topically 2 times daily as needed. Taking As Needed    latanoprost (XALATAN) 0.005 % ophthalmic solution Apply 1 drop to eye at bedtime. 3/13/2025 Bedtime    methenamine hippurate (HIPREX) 1 g tablet Take 1 g by mouth 2 times  daily 3/14/2025 Morning    multivitamin (CENTRUM SILVER) tablet Take 1 tablet by mouth daily 3/14/2025 Morning    omeprazole (PRILOSEC) 20 MG DR capsule Take 20 mg by mouth daily 3/13/2025 Noon    Probiotic Product (PROBIOTIC ADVANCED PO) Take 1 capsule by mouth daily. 3/13/2025 Noon    rivaroxaban ANTICOAGULANT (XARELTO ANTICOAGULANT) 20 MG TABS tablet Take 1 tablet (20 mg) by mouth daily (with dinner). 3/13/2025 Noon

## 2025-03-15 LAB
ALBUMIN SERPL BCG-MCNC: 3.6 G/DL (ref 3.5–5.2)
ALP SERPL-CCNC: 103 U/L (ref 40–150)
ALT SERPL W P-5'-P-CCNC: 37 U/L (ref 0–50)
ANION GAP SERPL CALCULATED.3IONS-SCNC: 10 MMOL/L (ref 7–15)
AST SERPL W P-5'-P-CCNC: 73 U/L (ref 0–45)
BILIRUB SERPL-MCNC: 0.5 MG/DL
BUN SERPL-MCNC: 10.9 MG/DL (ref 8–23)
CALCIUM SERPL-MCNC: 9.1 MG/DL (ref 8.8–10.4)
CHLORIDE SERPL-SCNC: 103 MMOL/L (ref 98–107)
CREAT SERPL-MCNC: 0.68 MG/DL (ref 0.51–0.95)
EGFRCR SERPLBLD CKD-EPI 2021: 89 ML/MIN/1.73M2
ERYTHROCYTE [DISTWIDTH] IN BLOOD BY AUTOMATED COUNT: 13.2 % (ref 10–15)
GLUCOSE SERPL-MCNC: 88 MG/DL (ref 70–99)
HCO3 SERPL-SCNC: 26 MMOL/L (ref 22–29)
HCT VFR BLD AUTO: 35.2 % (ref 35–47)
HGB BLD-MCNC: 11.8 G/DL (ref 11.7–15.7)
MAGNESIUM SERPL-MCNC: 1.9 MG/DL (ref 1.7–2.3)
MCH RBC QN AUTO: 31 PG (ref 26.5–33)
MCHC RBC AUTO-ENTMCNC: 33.5 G/DL (ref 31.5–36.5)
MCV RBC AUTO: 92 FL (ref 78–100)
PHOSPHATE SERPL-MCNC: 4 MG/DL (ref 2.5–4.5)
PLATELET # BLD AUTO: 288 10E3/UL (ref 150–450)
POTASSIUM SERPL-SCNC: 4.2 MMOL/L (ref 3.4–5.3)
PROT SERPL-MCNC: 6 G/DL (ref 6.4–8.3)
RBC # BLD AUTO: 3.81 10E6/UL (ref 3.8–5.2)
SODIUM SERPL-SCNC: 139 MMOL/L (ref 135–145)
WBC # BLD AUTO: 10.2 10E3/UL (ref 4–11)

## 2025-03-15 PROCEDURE — 250N000013 HC RX MED GY IP 250 OP 250 PS 637: Performed by: STUDENT IN AN ORGANIZED HEALTH CARE EDUCATION/TRAINING PROGRAM

## 2025-03-15 PROCEDURE — 82040 ASSAY OF SERUM ALBUMIN: CPT | Performed by: STUDENT IN AN ORGANIZED HEALTH CARE EDUCATION/TRAINING PROGRAM

## 2025-03-15 PROCEDURE — 250N000013 HC RX MED GY IP 250 OP 250 PS 637: Performed by: INTERNAL MEDICINE

## 2025-03-15 PROCEDURE — 82247 BILIRUBIN TOTAL: CPT | Performed by: STUDENT IN AN ORGANIZED HEALTH CARE EDUCATION/TRAINING PROGRAM

## 2025-03-15 PROCEDURE — 120N000001 HC R&B MED SURG/OB

## 2025-03-15 PROCEDURE — G0378 HOSPITAL OBSERVATION PER HR: HCPCS

## 2025-03-15 PROCEDURE — 85014 HEMATOCRIT: CPT | Performed by: STUDENT IN AN ORGANIZED HEALTH CARE EDUCATION/TRAINING PROGRAM

## 2025-03-15 PROCEDURE — 258N000003 HC RX IP 258 OP 636: Performed by: INTERNAL MEDICINE

## 2025-03-15 PROCEDURE — 36415 COLL VENOUS BLD VENIPUNCTURE: CPT | Performed by: STUDENT IN AN ORGANIZED HEALTH CARE EDUCATION/TRAINING PROGRAM

## 2025-03-15 PROCEDURE — 83735 ASSAY OF MAGNESIUM: CPT | Performed by: STUDENT IN AN ORGANIZED HEALTH CARE EDUCATION/TRAINING PROGRAM

## 2025-03-15 PROCEDURE — 82947 ASSAY GLUCOSE BLOOD QUANT: CPT | Performed by: STUDENT IN AN ORGANIZED HEALTH CARE EDUCATION/TRAINING PROGRAM

## 2025-03-15 PROCEDURE — 84100 ASSAY OF PHOSPHORUS: CPT | Performed by: STUDENT IN AN ORGANIZED HEALTH CARE EDUCATION/TRAINING PROGRAM

## 2025-03-15 PROCEDURE — 99233 SBSQ HOSP IP/OBS HIGH 50: CPT | Performed by: INTERNAL MEDICINE

## 2025-03-15 RX ORDER — SODIUM CHLORIDE, SODIUM LACTATE, POTASSIUM CHLORIDE, CALCIUM CHLORIDE 600; 310; 30; 20 MG/100ML; MG/100ML; MG/100ML; MG/100ML
INJECTION, SOLUTION INTRAVENOUS CONTINUOUS
Status: DISCONTINUED | OUTPATIENT
Start: 2025-03-15 | End: 2025-03-16 | Stop reason: HOSPADM

## 2025-03-15 RX ORDER — BISMUTH SUBSALICYLATE 262 MG/1
524 TABLET, CHEWABLE ORAL
Status: DISCONTINUED | OUTPATIENT
Start: 2025-03-15 | End: 2025-03-16 | Stop reason: HOSPADM

## 2025-03-15 RX ORDER — LATANOPROST 50 UG/ML
1 SOLUTION/ DROPS OPHTHALMIC AT BEDTIME
Status: DISCONTINUED | OUTPATIENT
Start: 2025-03-15 | End: 2025-03-16 | Stop reason: HOSPADM

## 2025-03-15 RX ADMIN — BISMUTH SUBSALICYLATE 524 MG: 262 TABLET ORAL at 21:22

## 2025-03-15 RX ADMIN — DILTIAZEM HYDROCHLORIDE 180 MG: 180 CAPSULE, COATED, EXTENDED RELEASE ORAL at 08:23

## 2025-03-15 RX ADMIN — ACETAMINOPHEN 650 MG: 325 TABLET, FILM COATED ORAL at 21:22

## 2025-03-15 RX ADMIN — FLECAINIDE ACETATE 100 MG: 100 TABLET ORAL at 08:59

## 2025-03-15 RX ADMIN — RIVAROXABAN 20 MG: 10 TABLET, FILM COATED ORAL at 16:01

## 2025-03-15 RX ADMIN — BISMUTH SUBSALICYLATE 524 MG: 262 TABLET ORAL at 15:55

## 2025-03-15 RX ADMIN — METHENAMINE HIPPURATE 1 G: 1000 TABLET ORAL at 20:41

## 2025-03-15 RX ADMIN — ENALAPRIL MALEATE 20 MG: 10 TABLET ORAL at 08:23

## 2025-03-15 RX ADMIN — SODIUM CHLORIDE, POTASSIUM CHLORIDE, SODIUM LACTATE AND CALCIUM CHLORIDE: 600; 310; 30; 20 INJECTION, SOLUTION INTRAVENOUS at 11:29

## 2025-03-15 RX ADMIN — LATANOPROST 1 DROP: 50 SOLUTION/ DROPS OPHTHALMIC at 22:03

## 2025-03-15 RX ADMIN — FLECAINIDE ACETATE 100 MG: 100 TABLET ORAL at 20:43

## 2025-03-15 RX ADMIN — AMLODIPINE BESYLATE 2.5 MG: 2.5 TABLET ORAL at 15:55

## 2025-03-15 RX ADMIN — AMLODIPINE BESYLATE 5 MG: 5 TABLET ORAL at 08:23

## 2025-03-15 RX ADMIN — DILTIAZEM HYDROCHLORIDE 180 MG: 180 CAPSULE, COATED, EXTENDED RELEASE ORAL at 20:41

## 2025-03-15 RX ADMIN — SODIUM CHLORIDE, POTASSIUM CHLORIDE, SODIUM LACTATE AND CALCIUM CHLORIDE: 600; 310; 30; 20 INJECTION, SOLUTION INTRAVENOUS at 22:03

## 2025-03-15 RX ADMIN — METHENAMINE HIPPURATE 1 G: 1000 TABLET ORAL at 08:23

## 2025-03-15 RX ADMIN — PANTOPRAZOLE SODIUM 40 MG: 40 TABLET, DELAYED RELEASE ORAL at 11:29

## 2025-03-15 RX ADMIN — BISMUTH SUBSALICYLATE 524 MG: 262 TABLET ORAL at 11:30

## 2025-03-15 ASSESSMENT — ACTIVITIES OF DAILY LIVING (ADL)
ADLS_ACUITY_SCORE: 38
ADLS_ACUITY_SCORE: 41
ADLS_ACUITY_SCORE: 37
ADLS_ACUITY_SCORE: 41
ADLS_ACUITY_SCORE: 36
ADLS_ACUITY_SCORE: 38
ADLS_ACUITY_SCORE: 37
ADLS_ACUITY_SCORE: 36
ADLS_ACUITY_SCORE: 37
ADLS_ACUITY_SCORE: 38
ADLS_ACUITY_SCORE: 38
ADLS_ACUITY_SCORE: 37
ADLS_ACUITY_SCORE: 41
ADLS_ACUITY_SCORE: 38
ADLS_ACUITY_SCORE: 37
ADLS_ACUITY_SCORE: 41
ADLS_ACUITY_SCORE: 36
ADLS_ACUITY_SCORE: 37

## 2025-03-15 NOTE — PLAN OF CARE
A&oX4. Generalized abdominal discomfort noted. On a regular diet, tolerated well. Will continue with plan of care.    PRIMARY DIAGNOSIS: GENERALIZED ABDOMINAL PAIN    OUTPATIENT/OBSERVATION GOALS TO BE MET BEFORE DISCHARGE  1. Tolerating PO fluid : Yes    2. Nausea/Vomiting/Diarrhea symptoms improved: Yes    3. Pain status: Improved-controlled with oral pain medications.    4. Return to near baseline physical activity: Yes    Discharge Planner Nurse   Safe discharge environment identified: Yes  Barriers to discharge: No       Entered by: Lorraine Barlow RN 03/15/2025 9:05 AM     Please review provider order for any additional goals.   Nurse to notify provider when observation goals have been met and patient is ready for discharge.

## 2025-03-15 NOTE — PLAN OF CARE
Notified provider about indwelling quiroga catheter discussed removal or continued need.    Did provider choose to remove indwelling quiroga catheter? NO    Provider's quiroga indication for keeping indwelling quiroga catheter: Indication for continued use: /GI/GYN Pelvic Procedure    Is there an order for indwelling quiroga catheter? YES    *If there is a plan to keep quiroga catheter in place at discharge daily notification with provider is not necessary, but please add a notation in the treatment team sticky note that the patient will be discharging with the catheter.

## 2025-03-15 NOTE — PROGRESS NOTES
[facility]  Hospitalist Progress Note  Admit 3/14/2025 11:29 AM    Name: Ebony Pinzon    MRN: [id number]  Provider:  Ranjit Chavez MD, [facility]    Date of Service: 03/15/2025     Reason for Stay (Diagnosis): Diarrhea with leukocytosis, distal colitis         Summary of hospital stay & Assessment/Plan:   Summary of Stay: Ebony Pinzon is a 79 year old female who was admitted on 3/14/2025    79-year-old female with a history of atrial fibrillation on Xarelto, IBS, fibromyalgia, GERD, hypertension, and recent urologic procedure for prolapse, admitted for monitoring after presenting with abdominal pain following a bowel movement. She experienced dizziness, nausea, and vomiting but had no true syncope. CT showed colonic diverticulosis without diverticulitis and findings suggestive of distal colitis. Leukocytosis and mild anemia were noted but without signs of acute infection. She and her  preferred hospital observation due to concerns about worsening symptoms.    Diarrhea with leukocytosis, distal colitis  Advance diet as tolerated  She continues to have severe symptom unable to keep any oral intake without having to go to the bathroom and have severe watery diarrhea, she also has blood in her stool but she has hemorrhoids she is unsafe to discharge today and will need to be admitted from observation with ongoing need for hospital care  IV fluid  Pepto-Bismol as needed  Obtain enteric panel  PRN Tylenol for pain  BMP in a.m.  Recent urologic procedure (3/11/25)  CT showed bladder wall thickening, possibly cystitis  UA without evidence of infection  Catheter removal planned as outpatient on Monday  LLE swelling  LE US negative for DVT  Likely chronic; recommend outpatient evaluation  Cholelithiasis (incidental)  Monitor  Normocytic anemia, mild  No evidence of acute bleeding  Atrial fibrillation  Continue Xarelto, diltiazem, flecainide  Fibromyalgia  Noted  GERD  Continue  "omeprazole  Hypertension  Continue amlodipine      Clinically Significant Risk Factors Present on Admission                # Drug Induced Coagulation Defect: home medication list includes an anticoagulant medication    # Hypertension: Home medication list includes antihypertensive(s)           # Overweight: Estimated body mass index is 25.17 kg/m  as calculated from the following:    Height as of this encounter: 1.702 m (5' 7\").    Weight as of this encounter: 72.9 kg (160 lb 11.2 oz).                DVT Prophylaxis: DOAC  Code Status:  Full Code    Disposition Plan   Incidental findings/discharge issues cholelithiasis  Medically Ready for Discharge: Anticipated Tomorrow   prior living arrangement once diarrhea improves able to tolerate p.o. intake without severe diarrhea.     Entered: Ranjit Chavez MD 03/15/2025, 7:36 AM                 Interval History:       Was doing well until she ate then she has been sitting on the toilet since after breakfast with abdominal cramp at times severe  Today's plan detailed above discussed with nursing               Physical Exam:   Physical Exam   Temp: 98.2  F (36.8  C) Temp src: Oral BP: 124/52 Pulse: 76   Resp: 16 SpO2: 95 % O2 Device: None (Room air)    Vitals:    03/14/25 1746   Weight: 72.9 kg (160 lb 11.2 oz)     I/O last 3 completed shifts:  In: 120 [P.O.:120]  Out: 2425 [Urine:2425]          GENERAL:  Comfortable.   PSYCH: pleasant, oriented, No acute distress.  EYES: PERRLA, Normal conjunctiva.  HEART:  Normal S1, S2 with no edema.  LUNGS:  Clear to auscultation, normal Respiratory effort.  ABDOMEN:  Soft, no hepatosplenomegaly, normal bowel sounds.  SKIN:  Dry to touch, No rash.      Medications   Current Facility-Administered Medications   Medication Dose Route Frequency Provider Last Rate Last Admin     Current Facility-Administered Medications   Medication Dose Route Frequency Provider Last Rate Last Admin    amLODIPine (NORVASC) tablet 2.5 mg  2.5 mg Oral " Daily at 4 pm Nadia Kearney, DO   2.5 mg at 03/14/25 2047    amLODIPine (NORVASC) tablet 5 mg  5 mg Oral QAM Nadia Kearney DO        diltiazem ER COATED BEADS (CARDIZEM CD/CARTIA XT) 24 hr capsule 180 mg  180 mg Oral BID Nadia Kearney, DO   180 mg at 03/14/25 2042    enalapril (VASOTEC) tablet 20 mg  20 mg Oral Daily Nadia Kearney DO        flecainide (TAMBOCOR) tablet 100 mg  100 mg Oral BID Nadia Kearney, DO   100 mg at 03/14/25 2042    methenamine hippurate (HIPREX) tablet 1 g  1 g Oral BID Nadia Kearney, DO   1 g at 03/14/25 2042    pantoprazole (PROTONIX) EC tablet 40 mg  40 mg Oral Daily Nadia Kearney,         rivaroxaban ANTICOAGULANT (XARELTO) tablet 20 mg  20 mg Oral Daily with supper Nadia Kearney, DO   20 mg at 03/14/25 2042     Data     -Data reviewed today:  I personally reviewed  all new labs and imaging results over the last 24 hours.    Recent Labs   Lab 03/15/25  0714 03/14/25  1310   WBC 10.2 14.9*   HGB 11.8 11.1*   HCT 35.2 33.5*   MCV 92 94    277     Recent Labs   Lab 03/15/25  0714 03/14/25  1310    135   POTASSIUM 4.2 4.2   CHLORIDE 103 100   CO2 26 26   ANIONGAP 10 9   GLC 88 106*   BUN 10.9 16.8   CR 0.68 0.75   GFRESTIMATED 89 81   ASHLEY 9.1 8.9       Recent Results (from the past 24 hours)   CT Abdomen Pelvis w Contrast    Narrative    EXAM: CT ABDOMEN PELVIS W CONTRAST  LOCATION: [facility]  DATE: 3/14/2025    INDICATION: Lower abd pain, recent bladder prolapse surgery at [place].  COMPARISON: CT 12/20/2023.  TECHNIQUE: CT scan of the abdomen and pelvis was performed following injection of IV contrast. Multiplanar reformats were obtained. Dose reduction techniques were used.  CONTRAST: 90 mL Isovue 370    FINDINGS:   LOWER CHEST: Small hiatal hernia appears stable.    HEPATOBILIARY: Cholelithiasis. No acute liver abnormality.    PANCREAS: Stable atrophy. No acute pancreas abnormality identified.    SPLEEN:  Normal.    ADRENAL GLANDS: Stable left adrenal thickening. Unremarkable right adrenal.    KIDNEYS/BLADDER: No hydronephrosis or stone. 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 Catheter within the urinary bladder. There is diffuse wall thickening of the bladder that appears edematous. A few tiny   bubbles of gas within the bladder lumen suggested.    BOWEL: There is a degree of wall thickening involving the descending colon and sigmoid. Sigmoid diverticulosis. No focal diverticulitis identified. Nothing for appendicitis. No abscess identified.    LYMPH NODES: Normal.    VASCULATURE: Scattered calcifications.    PELVIC ORGANS: Uterus is present. No suspicious adnexal lesion. A few stable pelvic calcifications. There is a mild degree of edema along the pelvic floor. No focal fluid collection identified.    MUSCULOSKELETAL: Spine degenerative changes. No focal bone lesion.      Impression    IMPRESSION:   1.  Wall thickening of the urinary bladder that appears edematous may represent a cystitis.  2.  Some wall prominence of the descending and sigmoid colon suggesting a distal colitis. No abscess identified.  3.  Edema at the pelvic floor is likely postoperative. No fluid collection identified.  4.  Cholelithiasis.  5.  Colonic diverticulosis.   US Lower Extremity Venous Duplex Left    Narrative    EXAM: US LOWER EXTREMITY VENOUS DUPLEX LEFT  LOCATION: [facility]  DATE: 3/14/2025    INDICATION: thigh pain, post op, ?dvt  COMPARISON: None.  TECHNIQUE: Venous Duplex ultrasound of the left lower extremity with and without compression, augmentation and duplex. Color flow and spectral Doppler with waveform analysis performed.    FINDINGS: Exam includes the common femoral, femoral, popliteal, and contralateral common femoral veins as well as segmentally visualized deep calf veins and greater saphenous vein.     LEFT: No deep vein thrombosis. Trace echogenic wall  thickening or calcification along the common femoral vein posteriorly, which may representing chronic postthrombotic change versus calcifications and likely of no clinical significance. The CFV appears   widely patent and fully compressible.  No superficial thrombophlebitis. No popliteal cyst.      Impression    IMPRESSION:  No deep venous thrombosis in the left lower extremity.         This document was produced using voice recognition software

## 2025-03-15 NOTE — PLAN OF CARE
"Pt alert and oriented. On RA. C/o lower abdominal pain, PRN Tylenol given. Denies nausea. No BM this shift. [place] in place, draining adequately. Tolerating diet. Up with SBA.     Problem: Adult Inpatient Plan of Care  Goal: Plan of Care Review  Description: The Plan of Care Review/Shift note should be completed every shift.  The Outcome Evaluation is a brief statement about your assessment that the patient is improving, declining, or no change.  This information will be displayed automatically on your shift  note.  Outcome: Progressing  Flowsheets (Taken 3/15/2025 0608)  Outcome Evaluation: Pt alert and oriented. On RA. C/o lower abdominal pain, PRN Tylenol given. Denies nausea. No BM this shift. Tolerating diet. Up with SBA.  Plan of Care Reviewed With: patient  Overall Patient Progress: improving  Goal: Patient-Specific Goal (Individualized)  Description: You can add care plan individualizations to a care plan. Examples of Individualization might be:  \"Parent requests to be called daily at 9am for status\", \"I have a hard time hearing out of my right ear\", or \"Do not touch me to wake me up as it startles  me\".  Outcome: Progressing  Goal: Absence of Hospital-Acquired Illness or Injury  Outcome: Progressing  Goal: Optimal Comfort and Wellbeing  Outcome: Progressing  Goal: Readiness for Transition of Care  Outcome: Progressing     Problem: Delirium  Goal: Optimal Coping  Outcome: Progressing  Goal: Improved Behavioral Control  Outcome: Progressing  Goal: Improved Attention and Thought Clarity  Outcome: Progressing  Goal: Improved Sleep  Outcome: Progressing     Problem: Infection  Goal: Absence of Infection Signs and Symptoms  Outcome: Progressing     Problem: Pain Acute  Goal: Optimal Pain Control and Function  Outcome: Progressing   Goal Outcome Evaluation:      Plan of Care Reviewed With: patient    Overall Patient Progress: improvingOverall Patient Progress: improving    Outcome Evaluation: Pt alert and oriented. " On RA. C/o lower abdominal pain, PRN Tylenol given. Denies nausea. No BM this shift. Tolerating diet. Up with SBA.

## 2025-03-16 VITALS
DIASTOLIC BLOOD PRESSURE: 55 MMHG | HEIGHT: 67 IN | TEMPERATURE: 98.3 F | SYSTOLIC BLOOD PRESSURE: 134 MMHG | RESPIRATION RATE: 16 BRPM | WEIGHT: 160.7 LBS | BODY MASS INDEX: 25.22 KG/M2 | OXYGEN SATURATION: 97 % | HEART RATE: 76 BPM

## 2025-03-16 LAB
ANION GAP SERPL CALCULATED.3IONS-SCNC: 11 MMOL/L (ref 7–15)
BUN SERPL-MCNC: 9.5 MG/DL (ref 8–23)
CALCIUM SERPL-MCNC: 8.8 MG/DL (ref 8.8–10.4)
CHLORIDE SERPL-SCNC: 104 MMOL/L (ref 98–107)
CREAT SERPL-MCNC: 0.65 MG/DL (ref 0.51–0.95)
EGFRCR SERPLBLD CKD-EPI 2021: 90 ML/MIN/1.73M2
ERYTHROCYTE [DISTWIDTH] IN BLOOD BY AUTOMATED COUNT: 13.2 % (ref 10–15)
GLUCOSE SERPL-MCNC: 86 MG/DL (ref 70–99)
HCO3 SERPL-SCNC: 27 MMOL/L (ref 22–29)
HCT VFR BLD AUTO: 32.2 % (ref 35–47)
HGB BLD-MCNC: 11 G/DL (ref 11.7–15.7)
MCH RBC QN AUTO: 31.6 PG (ref 26.5–33)
MCHC RBC AUTO-ENTMCNC: 34.2 G/DL (ref 31.5–36.5)
MCV RBC AUTO: 93 FL (ref 78–100)
PLATELET # BLD AUTO: 271 10E3/UL (ref 150–450)
POTASSIUM SERPL-SCNC: 3.9 MMOL/L (ref 3.4–5.3)
RBC # BLD AUTO: 3.48 10E6/UL (ref 3.8–5.2)
SODIUM SERPL-SCNC: 142 MMOL/L (ref 135–145)
WBC # BLD AUTO: 10.2 10E3/UL (ref 4–11)

## 2025-03-16 PROCEDURE — 85014 HEMATOCRIT: CPT | Performed by: INTERNAL MEDICINE

## 2025-03-16 PROCEDURE — 82435 ASSAY OF BLOOD CHLORIDE: CPT | Performed by: INTERNAL MEDICINE

## 2025-03-16 PROCEDURE — 258N000003 HC RX IP 258 OP 636: Performed by: INTERNAL MEDICINE

## 2025-03-16 PROCEDURE — 80048 BASIC METABOLIC PNL TOTAL CA: CPT | Performed by: INTERNAL MEDICINE

## 2025-03-16 PROCEDURE — 250N000013 HC RX MED GY IP 250 OP 250 PS 637: Performed by: INTERNAL MEDICINE

## 2025-03-16 PROCEDURE — 82565 ASSAY OF CREATININE: CPT | Performed by: INTERNAL MEDICINE

## 2025-03-16 PROCEDURE — 99239 HOSP IP/OBS DSCHRG MGMT >30: CPT | Performed by: INTERNAL MEDICINE

## 2025-03-16 PROCEDURE — 85041 AUTOMATED RBC COUNT: CPT | Performed by: INTERNAL MEDICINE

## 2025-03-16 PROCEDURE — 250N000013 HC RX MED GY IP 250 OP 250 PS 637: Performed by: STUDENT IN AN ORGANIZED HEALTH CARE EDUCATION/TRAINING PROGRAM

## 2025-03-16 PROCEDURE — 36415 COLL VENOUS BLD VENIPUNCTURE: CPT | Performed by: INTERNAL MEDICINE

## 2025-03-16 RX ADMIN — BISMUTH SUBSALICYLATE 524 MG: 262 TABLET ORAL at 07:58

## 2025-03-16 RX ADMIN — ACETAMINOPHEN 650 MG: 325 TABLET, FILM COATED ORAL at 09:14

## 2025-03-16 RX ADMIN — SODIUM CHLORIDE, POTASSIUM CHLORIDE, SODIUM LACTATE AND CALCIUM CHLORIDE: 600; 310; 30; 20 INJECTION, SOLUTION INTRAVENOUS at 06:52

## 2025-03-16 RX ADMIN — DILTIAZEM HYDROCHLORIDE 180 MG: 180 CAPSULE, COATED, EXTENDED RELEASE ORAL at 07:57

## 2025-03-16 RX ADMIN — ENALAPRIL MALEATE 20 MG: 10 TABLET ORAL at 07:58

## 2025-03-16 RX ADMIN — FLECAINIDE ACETATE 100 MG: 100 TABLET ORAL at 07:59

## 2025-03-16 RX ADMIN — BISMUTH SUBSALICYLATE 524 MG: 262 TABLET ORAL at 11:34

## 2025-03-16 RX ADMIN — METHENAMINE HIPPURATE 1 G: 1000 TABLET ORAL at 07:58

## 2025-03-16 RX ADMIN — PANTOPRAZOLE SODIUM 40 MG: 40 TABLET, DELAYED RELEASE ORAL at 11:34

## 2025-03-16 RX ADMIN — AMLODIPINE BESYLATE 5 MG: 5 TABLET ORAL at 07:58

## 2025-03-16 ASSESSMENT — ACTIVITIES OF DAILY LIVING (ADL)
ADLS_ACUITY_SCORE: 41

## 2025-03-16 NOTE — DISCHARGE SUMMARY
[facility]  Discharge Summary  Hospitalist      Date of Admission:  3/14/2025  Date of Discharge:  3/16/2025  Provider:  Ranjit Chavez MD. [facility]  Date of Service (when I last saw the patient): 03/16/25      Primary Provider: Poppy Whelan          Discharge Diagnosis:     Discharge Diagnoses   Distal colitis ischemic vs. viral  Colonic diverticulosis without diverticulitis  Normocytic anemia, mild  Atrial fibrillation  Fibromyalgia  Gastroesophageal reflux disease (GERD)  Hypertension  Status post recent urologic procedure for prolapse  Lower left extremity swelling, likely chronic  Incidental cholelithiasis    Other medical issues:  Past Medical History:   Diagnosis Date    Anxiety     Benign essential hypertension     Fibromyalgia     Gastroesophageal reflux disease     Irritable bowel syndrome     Paroxysmal atrial fibrillation     Urethral caruncle     Vaginal wall prolapse          Please see the admission history and physical for full details.     Hospital Course     Ebony Pinzon was admitted on 3/14/2025.  The following problems were addressed during her hospitalization:    Brief History of Presentation: The patient, with a medical history of atrial fibrillation (on Xarelto), irritable bowel syndrome (IBS), fibromyalgia, gastroesophageal reflux disease (GERD), hypertension, and recent urologic procedure for prolapse, was admitted for observation due to abdominal pain following a bowel movement. She also experienced dizziness, nausea, and vomiting but no true syncope. Initial CT imaging revealed colonic diverticulosis without diverticulitis and findings suggestive of distal colitis. Laboratory results showed leukocytosis and mild anemia without evidence of acute infection.  Hospital Course:  Distal Colitis and Diarrhea: The patient presented with severe watery diarrhea and blood in her stools due to hemorrhoids. She was initially managed with IV fluids to maintain  hydration and Pepto-Bismol as needed for symptomatic relief. An enteric panel was ordered to assess for potential infectious causes but patient stopped having stools.  C. difficile was negative.  Her diet was advanced as tolerated, and she was given PRN Tylenol for pain management.  Atrial Fibrillation: The patient continued her home medications, including Xarelto, diltiazem, and flecainide, to manage her atrial fibrillation. Her cardiac status remained stable throughout the hospitalization.  Status Post Recent Urologic Procedure: The patient had recently undergone a urologic procedure for prolapse. CT imaging showed bladder wall thickening suggestive of possible cystitis, but a urinalysis showed no evidence of infection. The plan for outpatient catheter removal was confirmed.  Lower Left Extremity Swelling: The patient had swelling in the lower left extremity. An ultrasound was performed, which was negative for deep vein thrombosis (DVT). The swelling was likely chronic, and outpatient evaluation was recommended.  Incidental Cholelithiasis: Cholelithiasis was noted incidentally on imaging. No acute symptoms were present, and monitoring was advised.    By discharge, the patient had not experienced a bowel movement since the previous day and was tolerating oral intake well, indicating resolution of her primary symptoms. She was discharged with follow-up plans to see her primary care physician and urology as scheduled.    Instructions:  Advance diet as tolerated  Monitor any recurrence of symptoms and seek medical attention if necessary  Pending Results   Unresulted Labs Ordered in the Past 30 Days of this Admission       No orders found from 2/12/2025 to 3/15/2025.            Discharge Orders      Reason for your hospital stay    Colitis     Follow-up and recommended labs and tests     follow-up with primary care physician in 1 week     Activity    Your activity upon discharge: activity as tolerated     Diet     Follow this diet upon discharge: Current Diet:Orders Placed This Encounter      Regular Diet Adult       Code Status   Full Code       Primary Care Physician   Poppy Whelan    Physical Exam   Temp: 98.3  F (36.8  C) Temp src: Oral BP: 134/55 Pulse: 76   Resp: 16 SpO2: 97 % O2 Device: None (Room air)    Vitals:    03/14/25 1746   Weight: 72.9 kg (160 lb 11.2 oz)     Vital Signs with Ranges  Temp:  [98  F (36.7  C)-98.6  F (37  C)] 98.3  F (36.8  C)  Pulse:  [69-77] 76  Resp:  [16] 16  BP: (107-137)/(46-66) 134/55  SpO2:  [93 %-97 %] 97 %  I/O last 3 completed shifts:  In: 720 [P.O.:720]  Out: 2525 [Urine:2525]    Constitutional:  alert, cooperative, no apparent distress  Respiratory: No increased work of breathing, good air exchange, no crackles or wheezing.  Cardiovascular: apical impulse,normal S1 and S2  GI: bowel sounds present, soft, non-distended, non-tender      Discharge Disposition   Discharged to home    Consultations This Hospital Stay   None    Time Spent on this Encounter   I, Ranjit Chavez MD, personally saw the patient today and spent greater than 30 minutes discharging this patient.      Discharge Medications   Current Discharge Medication List        START taking these medications    Details   bismuth subsalicylate (PEPTO BISMOL) 262 MG/15ML suspension Take 15 mLs by mouth 4 times daily for 4 days.  Qty: 117 mL, Refills: 0    Associated Diagnoses: Generalized weakness; Diarrhea, unspecified type           CONTINUE these medications which have NOT CHANGED    Details   !! amLODIPine (NORVASC) 2.5 MG tablet Take 5 mg by mouth every morning.      !! amLODIPine (NORVASC) 2.5 MG tablet Take 2.5 mg by mouth at bedtime.      Calcium Carb-Cholecalciferol (CALCIUM 500+D3 PO) Take 1 tablet by mouth daily.      CRANBERRY PO Take 1 capsule by mouth daily.      diltiazem ER (TIAZAC) 180 MG 24 hr ER beaded capsule Take 1 capsule (180 mg) by mouth 2 times daily.  Qty: 180 capsule, Refills: 3     Associated Diagnoses: Paroxysmal atrial fibrillation (H); Essential hypertension      enalapril (VASOTEC) 20 MG tablet Take 1 tablet (20 mg) by mouth daily.  Qty: 90 tablet, Refills: 3    Associated Diagnoses: Paroxysmal atrial fibrillation (H); Essential hypertension      estradiol (ESTRACE) 0.1 MG/GM vaginal cream Place vaginally three times a week. On Monday, Wednesday, Friday at bedtime      flecainide (TAMBOCOR) 100 MG tablet Take 1 tablet (100 mg) by mouth 2 times daily.  Qty: 180 tablet, Refills: 3    Associated Diagnoses: Paroxysmal atrial fibrillation (H)      hydrocortisone 2.5 % cream Apply topically 2 times daily as needed.      latanoprost (XALATAN) 0.005 % ophthalmic solution Place 1 drop into both eyes at bedtime.      methenamine hippurate (HIPREX) 1 g tablet Take 1 g by mouth 2 times daily      multivitamin (CENTRUM SILVER) tablet Take 1 tablet by mouth daily      omeprazole (PRILOSEC) 20 MG DR capsule Take 20 mg by mouth daily      Probiotic Product (PROBIOTIC ADVANCED PO) Take 1 capsule by mouth daily.      rivaroxaban ANTICOAGULANT (XARELTO ANTICOAGULANT) 20 MG TABS tablet Take 1 tablet (20 mg) by mouth daily (with dinner).  Qty: 90 tablet, Refills: 3    Associated Diagnoses: Paroxysmal atrial fibrillation (H)       !! - Potential duplicate medications found. Please discuss with provider.        STOP taking these medications       cephALEXin (KEFLEX) 250 MG capsule Comments:   Reason for Stopping:             Allergies   Allergies   Allergen Reactions    Amoxicillin-Pot Clavulanate      Other reaction(s): GI intolerance  C-Diff    Codeine Nausea    Neomycin      Other Reaction(s): *Unknown, Other (see comments)     Data   Most Recent 3 CBC's:  Recent Labs   Lab Test 03/16/25  0714 03/15/25  0714 03/14/25  1310   WBC 10.2 10.2 14.9*   HGB 11.0* 11.8 11.1*   MCV 93 92 94    288 277      Most Recent 3 BMP's:  Recent Labs   Lab Test 03/16/25  0714 03/15/25  0714 03/14/25  1310    139 135    POTASSIUM 3.9 4.2 4.2   CHLORIDE 104 103 100   CO2 27 26 26   BUN 9.5 10.9 16.8   CR 0.65 0.68 0.75   ANIONGAP 11 10 9   ASHLEY 8.8 9.1 8.9   GLC 86 88 106*     Most Recent 2 LFT's:  Recent Labs   Lab Test 03/15/25  0714 01/15/24  1546   AST 73* 24   ALT 37 13   ALKPHOS 103 132   BILITOTAL 0.5 0.3     Most Recent INR's and Anticoagulation Dosing History:  Anticoagulation Dose History          Latest Ref Rng & Units 12/20/2023 1/15/2024   Recent Dosing and Labs   INR 0.85 - 1.15 3.64  1.02      Most Recent 3 Troponin's:  Recent Labs   Lab Test 01/28/20  1731   TROPI <0.015     Most Recent Cholesterol Panel:  Recent Labs   Lab Test 09/05/23  1402   TRIG 103     Most Recent 6 Bacteria Isolates From Any Culture (See EPIC Reports for Culture Details):No lab results found.  Most Recent TSH, T4 and A1c Labs:No lab results found.  Results for orders placed or performed during the hospital encounter of 03/14/25   CT Abdomen Pelvis w Contrast    Narrative    EXAM: CT ABDOMEN PELVIS W CONTRAST  LOCATION: [facility]  DATE: 3/14/2025    INDICATION: Lower abd pain, recent bladder prolapse surgery at [place].  COMPARISON: CT 12/20/2023.  TECHNIQUE: CT scan of the abdomen and pelvis was performed following injection of IV contrast. Multiplanar reformats were obtained. Dose reduction techniques were used.  CONTRAST: 90 mL Isovue 370    FINDINGS:   LOWER CHEST: Small hiatal hernia appears stable.    HEPATOBILIARY: Cholelithiasis. No acute liver abnormality.    PANCREAS: Stable atrophy. No acute pancreas abnormality identified.    SPLEEN: Normal.    ADRENAL GLANDS: Stable left adrenal thickening. Unremarkable right adrenal.    KIDNEYS/BLADDER: No hydronephrosis or stone. 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 Catheter within the urinary bladder. There is diffuse wall thickening of the bladder that appears edematous. A few tiny   bubbles of gas within the bladder lumen  suggested.    BOWEL: There is a degree of wall thickening involving the descending colon and sigmoid. Sigmoid diverticulosis. No focal diverticulitis identified. Nothing for appendicitis. No abscess identified.    LYMPH NODES: Normal.    VASCULATURE: Scattered calcifications.    PELVIC ORGANS: Uterus is present. No suspicious adnexal lesion. A few stable pelvic calcifications. There is a mild degree of edema along the pelvic floor. No focal fluid collection identified.    MUSCULOSKELETAL: Spine degenerative changes. No focal bone lesion.      Impression    IMPRESSION:   1.  Wall thickening of the urinary bladder that appears edematous may represent a cystitis.  2.  Some wall prominence of the descending and sigmoid colon suggesting a distal colitis. No abscess identified.  3.  Edema at the pelvic floor is likely postoperative. No fluid collection identified.  4.  Cholelithiasis.  5.  Colonic diverticulosis.   US Lower Extremity Venous Duplex Left    Narrative    EXAM: US LOWER EXTREMITY VENOUS DUPLEX LEFT  LOCATION: [facility]  DATE: 3/14/2025    INDICATION: thigh pain, post op, ?dvt  COMPARISON: None.  TECHNIQUE: Venous Duplex ultrasound of the left lower extremity with and without compression, augmentation and duplex. Color flow and spectral Doppler with waveform analysis performed.    FINDINGS: Exam includes the common femoral, femoral, popliteal, and contralateral common femoral veins as well as segmentally visualized deep calf veins and greater saphenous vein.     LEFT: No deep vein thrombosis. Trace echogenic wall thickening or calcification along the common femoral vein posteriorly, which may representing chronic postthrombotic change versus calcifications and likely of no clinical significance. The CFV appears   widely patent and fully compressible.  No superficial thrombophlebitis. No popliteal cyst.      Impression    IMPRESSION:  No deep venous thrombosis in the left lower  extremity.             Disclaimer: This note consists of symbols derived from keyboarding, dictation and/or voice recognition software. As a result, there may be errors in the script that have gone undetected. Please consider this when interpreting information found in this chart.

## 2025-03-16 NOTE — PLAN OF CARE
"          To Do:  End of Shift Summary  For vital signs and complete assessments, please see documentation flowsheets.     Pertinent assessments: Pt is alert and oriented x4. VSS on RA. SBA to bathroom with walker. Regular diet, fair appetite. Indwelling catheter in place, adequate output. No BM during shift, enteric precautions maintained. Reports pain, prn tylenol given. IV infusing LR @ 100 ml/hr. Call light within reach and able to make needs known.       Major Shift Events: None     Treatment Plan: Enteric precautions, pain management     Bedside Nurse: Celeste Virk RN                                                     Outcome Evaluation:         Problem: Adult Inpatient Plan of Care  Goal: Plan of Care Review  Description: The Plan of Care Review/Shift note should be completed every shift.  The Outcome Evaluation is a brief statement about your assessment that the patient is improving, declining, or no change.  This information will be displayed automatically on your shift  note.  Outcome: Progressing  Flowsheets (Taken 3/15/2025 2345)  Outcome Evaluation: A&Ox4, Reports abdominal pain, prn tylenol given. No BM this shift. Passing gas.  Goal: Patient-Specific Goal (Individualized)  Description: You can add care plan individualizations to a care plan. Examples of Individualization might be:  \"Parent requests to be called daily at 9am for status\", \"I have a hard time hearing out of my right ear\", or \"Do not touch me to wake me up as it startles  me\".  Outcome: Progressing  Goal: Absence of Hospital-Acquired Illness or Injury  Outcome: Progressing  Intervention: Identify and Manage Fall Risk  Recent Flowsheet Documentation  Taken 3/15/2025 1900 by Celeste Virk RN  Safety Promotion/Fall Prevention: safety round/check completed  Intervention: Prevent Skin Injury  Recent Flowsheet Documentation  Taken 3/15/2025 1900 by Celeste Virk RN  Body Position: position changed " independently  Intervention: Prevent Infection  Recent Flowsheet Documentation  Taken 3/15/2025 1900 by Celeste Virk RN  Infection Prevention: single patient room provided  Goal: Optimal Comfort and Wellbeing  Outcome: Progressing  Goal: Readiness for Transition of Care  Outcome: Progressing     Problem: Delirium  Goal: Optimal Coping  Outcome: Progressing  Goal: Improved Behavioral Control  Outcome: Progressing  Intervention: Minimize Safety Risk  Recent Flowsheet Documentation  Taken 3/15/2025 1900 by Celeste Virk RN  Enhanced Safety Measures: room near unit station  Goal: Improved Attention and Thought Clarity  Outcome: Progressing  Goal: Improved Sleep  Outcome: Progressing     Problem: Infection  Goal: Absence of Infection Signs and Symptoms  Outcome: Progressing  Intervention: Prevent or Manage Infection  Recent Flowsheet Documentation  Taken 3/15/2025 1900 by Celeste Virk RN  Isolation Precautions: enteric precautions discontinued     Problem: Pain Acute  Goal: Optimal Pain Control and Function  Outcome: Progressing  Intervention: Prevent or Manage Pain  Recent Flowsheet Documentation  Taken 3/15/2025 1900 by Celeste Virk RN  Medication Review/Management: medications reviewed             Outcome Evaluation: A&Ox4, Reports abdominal pain, prn tylenol given. No BM this shift. Passing gas.

## 2025-03-16 NOTE — PROGRESS NOTES
Discharge Note    Patient discharged to home via private vehicle  accompanied by significant other .  IV: Discontinued  Prescriptions filled and given to patient/family.   Belongings reviewed and sent with patient and family.   Home medications returned to patient: NA  Equipment sent with: patient.   patient verbalizes understanding of discharge instructions. AVS given to patient.  Additional education completed? [place] Catheter [facility]    Jn Mathews RN

## 2025-03-16 NOTE — PLAN OF CARE
"Pt alert and oriented. On RA. Denies pain. IVF infusing. No BM this shift. [place] in place, draining adequately.     Problem: Adult Inpatient Plan of Care  Goal: Plan of Care Review  Description: The Plan of Care Review/Shift note should be completed every shift.  The Outcome Evaluation is a brief statement about your assessment that the patient is improving, declining, or no change.  This information will be displayed automatically on your shift  note.  Outcome: Progressing  Flowsheets (Taken 3/16/2025 0543)  Outcome Evaluation: Pt alert and oriented. On RA. Denies pain. IVF infusing. No BM this shift. [place] in place, draining adequately.  Plan of Care Reviewed With: patient  Overall Patient Progress: improving  Goal: Patient-Specific Goal (Individualized)  Description: You can add care plan individualizations to a care plan. Examples of Individualization might be:  \"Parent requests to be called daily at 9am for status\", \"I have a hard time hearing out of my right ear\", or \"Do not touch me to wake me up as it startles  me\".  Outcome: Progressing  Goal: Absence of Hospital-Acquired Illness or Injury  Outcome: Progressing  Intervention: Identify and Manage Fall Risk  Recent Flowsheet Documentation  Taken 3/16/2025 0047 by Evie Bee, RN  Safety Promotion/Fall Prevention:   safety round/check completed   patient and family education   nonskid shoes/slippers when out of bed   increase visualization of patient   clutter free environment maintained  Intervention: Prevent Skin Injury  Recent Flowsheet Documentation  Taken 3/16/2025 0047 by Evie Bee, RN  Body Position: position changed independently  Skin Protection: adhesive use limited  Intervention: Prevent and Manage VTE (Venous Thromboembolism) Risk  Recent Flowsheet Documentation  Taken 3/16/2025 0047 by Evie Bee RN  VTE Prevention/Management: SCDs off (sequential compression devices)  Intervention: Prevent Infection  Recent " Flowsheet Documentation  Taken 3/16/2025 0047 by Evie Bee, RN  Infection Prevention: single patient room provided  Goal: Optimal Comfort and Wellbeing  Outcome: Progressing  Intervention: Monitor Pain and Promote Comfort  Recent Flowsheet Documentation  Taken 3/15/2025 2316 by Evie Bee RN  Pain Management Interventions: declines  Goal: Readiness for Transition of Care  Outcome: Progressing     Problem: Delirium  Goal: Optimal Coping  Outcome: Progressing  Goal: Improved Behavioral Control  Outcome: Progressing  Intervention: Minimize Safety Risk  Recent Flowsheet Documentation  Taken 3/16/2025 0047 by Evie Bee RN  Enhanced Safety Measures: room near unit station  Goal: Improved Attention and Thought Clarity  Outcome: Progressing  Goal: Improved Sleep  Outcome: Progressing     Problem: Infection  Goal: Absence of Infection Signs and Symptoms  Outcome: Progressing  Intervention: Prevent or Manage Infection  Recent Flowsheet Documentation  Taken 3/16/2025 0047 by Evie Bee RN  Isolation Precautions: enteric precautions maintained     Problem: Pain Acute  Goal: Optimal Pain Control and Function  Outcome: Progressing  Intervention: Develop Pain Management Plan  Recent Flowsheet Documentation  Taken 3/15/2025 2316 by Evie Bee RN  Pain Management Interventions: declines  Intervention: Prevent or Manage Pain  Recent Flowsheet Documentation  Taken 3/16/2025 0047 by Evie Bee, RN  Medication Review/Management: medications reviewed     Problem: UTI (Urinary Tract Infection)  Goal: Improved Infection Symptoms  Outcome: Progressing   Goal Outcome Evaluation:      Plan of Care Reviewed With: patient    Overall Patient Progress: improvingOverall Patient Progress: improving    Outcome Evaluation: Pt alert and oriented. On RA. Denies pain. IVF infusing. No BM this shift. [place] in place, draining adequately.

## 2025-03-17 ENCOUNTER — PATIENT OUTREACH (OUTPATIENT)
Dept: CARE COORDINATION | Facility: CLINIC | Age: 80
End: 2025-03-17
Payer: COMMERCIAL

## 2025-03-17 NOTE — PROGRESS NOTES
Clinic Care Coordination Contact  Transitions of Care Outreach    Chief Complaint   Patient presents with    Clinic Care Coordination - Post Hospital       Most Recent Admission Date: 3/14/2025   Most Recent Admission Diagnosis: Generalized abdominal pain - R10.84  Generalized weakness - R53.1  Pain of left lower leg - M79.662  Diarrhea, unspecified type - R19.7     Most Recent Discharge Date: 3/16/2025   Most Recent Discharge Diagnosis: Generalized weakness - R53.1  Diarrhea, unspecified type - R19.7  Generalized abdominal pain - R10.84  Pain of left lower leg - M79.662     Transitions of Care Assessment    Discharge Assessment  How are you doing now that you are home?: Pt stated that she is doing well, she had appt today morning at [facility], planning to call PCP clinic to schedule f/u appt.  How are your symptoms? (Red Flag symptoms escalate to triage hotline per guidelines): Improved  Do you know how to contact your clinic care team if you have future questions or changes to your health status? : Yes  Does the patient have their discharge instructions? : Yes  Does the patient have questions regarding their discharge instructions? : No  Were you started on any new medications or were there changes to any of your previous medications? : Yes  Does the patient have all of their medications?: Yes  Do you have questions regarding any of your medications? : No  Do you have all of your needed medical supplies or equipment (DME)?  (i.e. oxygen tank, CPAP, cane, etc.): Yes              Follow up Plan     No future appointments.  Outpatient Plan as outlined on AVS reviewed with patient.      For any urgent concerns, please contact our 24 hour nurse triage line: [phone]       CARLOS Xiong  [phone]  [facility]

## 2025-07-19 ENCOUNTER — HEALTH MAINTENANCE LETTER (OUTPATIENT)
Age: 80
End: 2025-07-19